# Patient Record
Sex: MALE | Race: WHITE | NOT HISPANIC OR LATINO | URBAN - METROPOLITAN AREA
[De-identification: names, ages, dates, MRNs, and addresses within clinical notes are randomized per-mention and may not be internally consistent; named-entity substitution may affect disease eponyms.]

---

## 2017-08-28 ENCOUNTER — INPATIENT HOSPITAL (OUTPATIENT)
Dept: URBAN - METROPOLITAN AREA HOSPITAL 107 | Facility: HOSPITAL | Age: 62
End: 2017-08-28
Payer: COMMERCIAL

## 2017-08-28 DIAGNOSIS — K57.30 DIVERTICULOSIS OF LARGE INTESTINE WITHOUT PERFORATION OR ABS: ICD-10-CM

## 2017-08-28 DIAGNOSIS — K63.5 POLYP OF COLON: ICD-10-CM

## 2017-08-28 DIAGNOSIS — R10.31 RIGHT LOWER QUADRANT PAIN: ICD-10-CM

## 2017-08-28 DIAGNOSIS — R19.5 OTHER FECAL ABNORMALITIES: ICD-10-CM

## 2017-08-28 DIAGNOSIS — K63.9 DISEASE OF INTESTINE, UNSPECIFIED: ICD-10-CM

## 2017-08-28 DIAGNOSIS — K92.2 GASTROINTESTINAL HEMORRHAGE, UNSPECIFIED: ICD-10-CM

## 2017-08-28 PROCEDURE — 45378 DIAGNOSTIC COLONOSCOPY: CPT | Performed by: INTERNAL MEDICINE

## 2017-08-29 ENCOUNTER — INPATIENT HOSPITAL (OUTPATIENT)
Dept: URBAN - METROPOLITAN AREA HOSPITAL 107 | Facility: HOSPITAL | Age: 62
End: 2017-08-29
Payer: COMMERCIAL

## 2017-08-29 DIAGNOSIS — D64.9 ANEMIA, UNSPECIFIED: ICD-10-CM

## 2017-08-29 DIAGNOSIS — R14.0 ABDOMINAL DISTENSION (GASEOUS): ICD-10-CM

## 2017-08-29 DIAGNOSIS — R10.31 RIGHT LOWER QUADRANT PAIN: ICD-10-CM

## 2017-08-29 DIAGNOSIS — Z86.010 PERSONAL HISTORY OF COLONIC POLYPS: ICD-10-CM

## 2017-08-29 PROCEDURE — 99231 SBSQ HOSP IP/OBS SF/LOW 25: CPT | Performed by: PHYSICIAN ASSISTANT

## 2017-08-30 PROCEDURE — 99232 SBSQ HOSP IP/OBS MODERATE 35: CPT | Performed by: PHYSICIAN ASSISTANT

## 2017-08-31 PROCEDURE — 99232 SBSQ HOSP IP/OBS MODERATE 35: CPT | Performed by: PHYSICIAN ASSISTANT

## 2017-09-01 PROCEDURE — 99232 SBSQ HOSP IP/OBS MODERATE 35: CPT | Performed by: PHYSICIAN ASSISTANT

## 2017-09-02 ENCOUNTER — INPATIENT HOSPITAL (OUTPATIENT)
Dept: URBAN - METROPOLITAN AREA HOSPITAL 107 | Facility: HOSPITAL | Age: 62
End: 2017-09-02
Payer: COMMERCIAL

## 2017-09-02 DIAGNOSIS — R10.9 UNSPECIFIED ABDOMINAL PAIN: ICD-10-CM

## 2017-09-02 DIAGNOSIS — K56.7 ILEUS, UNSPECIFIED: ICD-10-CM

## 2017-09-02 DIAGNOSIS — D64.9 ANEMIA, UNSPECIFIED: ICD-10-CM

## 2017-09-02 DIAGNOSIS — Z86.010 PERSONAL HISTORY OF COLONIC POLYPS: ICD-10-CM

## 2017-09-02 PROCEDURE — 99232 SBSQ HOSP IP/OBS MODERATE 35: CPT | Performed by: INTERNAL MEDICINE

## 2017-09-03 PROCEDURE — 99232 SBSQ HOSP IP/OBS MODERATE 35: CPT | Performed by: INTERNAL MEDICINE

## 2017-09-04 PROCEDURE — 99232 SBSQ HOSP IP/OBS MODERATE 35: CPT | Performed by: INTERNAL MEDICINE

## 2017-09-05 PROCEDURE — 99232 SBSQ HOSP IP/OBS MODERATE 35: CPT | Performed by: PHYSICIAN ASSISTANT

## 2017-09-06 ENCOUNTER — INPATIENT HOSPITAL (OUTPATIENT)
Dept: URBAN - METROPOLITAN AREA HOSPITAL 107 | Facility: HOSPITAL | Age: 62
End: 2017-09-06
Payer: COMMERCIAL

## 2017-09-06 DIAGNOSIS — R14.0 ABDOMINAL DISTENSION (GASEOUS): ICD-10-CM

## 2017-09-06 DIAGNOSIS — D64.9 ANEMIA, UNSPECIFIED: ICD-10-CM

## 2017-09-06 DIAGNOSIS — R10.9 UNSPECIFIED ABDOMINAL PAIN: ICD-10-CM

## 2017-09-06 PROCEDURE — 99231 SBSQ HOSP IP/OBS SF/LOW 25: CPT | Performed by: PHYSICIAN ASSISTANT

## 2017-09-11 PROCEDURE — 99231 SBSQ HOSP IP/OBS SF/LOW 25: CPT | Performed by: PHYSICIAN ASSISTANT

## 2019-05-02 ENCOUNTER — INPATIENT HOSPITAL (OUTPATIENT)
Dept: URBAN - METROPOLITAN AREA HOSPITAL 76 | Facility: HOSPITAL | Age: 64
End: 2019-05-02

## 2019-05-02 DIAGNOSIS — R14.0 ABDOMINAL DISTENSION (GASEOUS): ICD-10-CM

## 2019-05-02 DIAGNOSIS — R19.7 DIARRHEA, UNSPECIFIED: ICD-10-CM

## 2019-05-02 DIAGNOSIS — K92.0 HEMATEMESIS: ICD-10-CM

## 2019-05-02 PROCEDURE — 99252 IP/OBS CONSLTJ NEW/EST SF 35: CPT | Performed by: INTERNAL MEDICINE

## 2019-05-03 ENCOUNTER — INPATIENT HOSPITAL (OUTPATIENT)
Dept: URBAN - METROPOLITAN AREA HOSPITAL 76 | Facility: HOSPITAL | Age: 64
End: 2019-05-03

## 2019-05-03 DIAGNOSIS — R10.84 GENERALIZED ABDOMINAL PAIN: ICD-10-CM

## 2019-05-03 DIAGNOSIS — J44.9 CHRONIC OBSTRUCTIVE PULMONARY DISEASE, UNSPECIFIED: ICD-10-CM

## 2019-05-03 DIAGNOSIS — I48.91 UNSPECIFIED ATRIAL FIBRILLATION: ICD-10-CM

## 2019-05-03 DIAGNOSIS — I25.10 ATHEROSCLEROTIC HEART DISEASE OF NATIVE CORONARY ARTERY WITH: ICD-10-CM

## 2019-05-03 DIAGNOSIS — K92.0 HEMATEMESIS: ICD-10-CM

## 2019-05-03 DIAGNOSIS — Z79.01 LONG TERM (CURRENT) USE OF ANTICOAGULANTS: ICD-10-CM

## 2019-05-03 DIAGNOSIS — Z95.1 PRESENCE OF AORTOCORONARY BYPASS GRAFT: ICD-10-CM

## 2019-05-03 DIAGNOSIS — R93.3 ABNORMAL FINDINGS ON DIAGNOSTIC IMAGING OF OTHER PARTS OF DI: ICD-10-CM

## 2019-05-03 DIAGNOSIS — Z86.73 PERSONAL HISTORY OF TRANSIENT ISCHEMIC ATTACK (TIA), AND CER: ICD-10-CM

## 2019-05-03 DIAGNOSIS — D53.9 NUTRITIONAL ANEMIA, UNSPECIFIED: ICD-10-CM

## 2019-05-03 PROCEDURE — 99232 SBSQ HOSP IP/OBS MODERATE 35: CPT | Performed by: INTERNAL MEDICINE

## 2019-05-06 ENCOUNTER — INPATIENT HOSPITAL (OUTPATIENT)
Dept: URBAN - METROPOLITAN AREA HOSPITAL 76 | Facility: HOSPITAL | Age: 64
End: 2019-05-06

## 2019-05-06 DIAGNOSIS — R93.3 ABNORMAL FINDINGS ON DIAGNOSTIC IMAGING OF OTHER PARTS OF DI: ICD-10-CM

## 2019-05-06 PROCEDURE — 45378 DIAGNOSTIC COLONOSCOPY: CPT | Mod: 53 | Performed by: INTERNAL MEDICINE

## 2019-05-20 ENCOUNTER — INPATIENT HOSPITAL (OUTPATIENT)
Dept: URBAN - METROPOLITAN AREA HOSPITAL 76 | Facility: HOSPITAL | Age: 64
End: 2019-05-20

## 2019-05-20 DIAGNOSIS — R14.0 ABDOMINAL DISTENSION (GASEOUS): ICD-10-CM

## 2019-05-20 DIAGNOSIS — K56.609 UNSPECIFIED INTESTINAL OBSTRUCTION, UNSPECIFIED AS TO PARTIA: ICD-10-CM

## 2019-05-20 DIAGNOSIS — D64.89 OTHER SPECIFIED ANEMIAS: ICD-10-CM

## 2019-05-20 DIAGNOSIS — A04.72 ENTEROCOLITIS DUE TO CLOSTRIDIUM DIFFICILE, NOT SPECIFIED AS: ICD-10-CM

## 2019-05-20 PROCEDURE — 99232 SBSQ HOSP IP/OBS MODERATE 35: CPT | Performed by: NURSE PRACTITIONER

## 2019-05-21 PROCEDURE — 99231 SBSQ HOSP IP/OBS SF/LOW 25: CPT | Performed by: NURSE PRACTITIONER

## 2019-06-20 ENCOUNTER — OFFICE (OUTPATIENT)
Dept: URBAN - METROPOLITAN AREA CLINIC 64 | Facility: CLINIC | Age: 64
End: 2019-06-20

## 2019-06-20 VITALS
WEIGHT: 170 LBS | DIASTOLIC BLOOD PRESSURE: 89 MMHG | SYSTOLIC BLOOD PRESSURE: 145 MMHG | HEIGHT: 67 IN | HEART RATE: 72 BPM

## 2019-06-20 DIAGNOSIS — K56.60 UNSPECIFIED INTESTINAL OBSTRUCTION: ICD-10-CM

## 2019-06-20 DIAGNOSIS — R12 HEARTBURN: ICD-10-CM

## 2019-06-20 DIAGNOSIS — A04.7 ENTEROCOLITIS DUE TO CLOSTRIDIUM DIFFICILE: ICD-10-CM

## 2019-06-20 DIAGNOSIS — K59.00 CONSTIPATION, UNSPECIFIED: ICD-10-CM

## 2019-06-20 PROCEDURE — 99202 OFFICE O/P NEW SF 15 MIN: CPT | Performed by: NURSE PRACTITIONER

## 2019-10-03 ENCOUNTER — LAB REQUISITION (OUTPATIENT)
Dept: LAB | Facility: HOSPITAL | Age: 64
End: 2019-10-03

## 2019-10-03 DIAGNOSIS — Z00.00 ROUTINE GENERAL MEDICAL EXAMINATION AT A HEALTH CARE FACILITY: ICD-10-CM

## 2019-10-03 LAB — NT-PROBNP SERPL-MCNC: 458.5 PG/ML (ref 5–900)

## 2019-10-03 PROCEDURE — 83880 ASSAY OF NATRIURETIC PEPTIDE: CPT

## 2019-12-11 ENCOUNTER — LAB REQUISITION (OUTPATIENT)
Dept: LAB | Facility: HOSPITAL | Age: 64
End: 2019-12-11

## 2019-12-11 DIAGNOSIS — I50.9 HEART FAILURE, UNSPECIFIED (HCC): ICD-10-CM

## 2019-12-11 LAB — NT-PROBNP SERPL-MCNC: 549.4 PG/ML (ref 5–900)

## 2019-12-11 PROCEDURE — 83880 ASSAY OF NATRIURETIC PEPTIDE: CPT | Performed by: INTERNAL MEDICINE

## 2019-12-12 ENCOUNTER — OFFICE (OUTPATIENT)
Dept: URBAN - METROPOLITAN AREA CLINIC 64 | Facility: CLINIC | Age: 64
End: 2019-12-12
Payer: MEDICAID

## 2019-12-12 VITALS
HEIGHT: 67 IN | HEART RATE: 87 BPM | DIASTOLIC BLOOD PRESSURE: 96 MMHG | WEIGHT: 199 LBS | SYSTOLIC BLOOD PRESSURE: 167 MMHG

## 2019-12-12 DIAGNOSIS — R14.0 ABDOMINAL DISTENSION (GASEOUS): ICD-10-CM

## 2019-12-12 DIAGNOSIS — J44.9 CHRONIC OBSTRUCTIVE PULMONARY DISEASE, UNSPECIFIED: ICD-10-CM

## 2019-12-12 DIAGNOSIS — R11.0 NAUSEA: ICD-10-CM

## 2019-12-12 DIAGNOSIS — R10.9 UNSPECIFIED ABDOMINAL PAIN: ICD-10-CM

## 2019-12-12 DIAGNOSIS — R19.07 GENERALIZED INTRA-ABDOMINAL AND PELVIC SWELLING, MASS AND LU: ICD-10-CM

## 2019-12-12 DIAGNOSIS — I10 ESSENTIAL (PRIMARY) HYPERTENSION: ICD-10-CM

## 2019-12-12 PROCEDURE — 99214 OFFICE O/P EST MOD 30 MIN: CPT | Performed by: NURSE PRACTITIONER

## 2019-12-12 RX ORDER — ONDANSETRON HYDROCHLORIDE 4 MG/1
TABLET, FILM COATED ORAL
Qty: 60 | Refills: 1 | Status: ACTIVE

## 2019-12-12 RX ORDER — SIMETHICONE 180 MG/1
CAPSULE, LIQUID FILLED ORAL
Qty: 0 | Refills: 0 | Status: COMPLETED
End: 2019-12-12

## 2020-02-12 ENCOUNTER — OFFICE VISIT (OUTPATIENT)
Dept: CARDIOLOGY | Facility: CLINIC | Age: 65
End: 2020-02-12

## 2020-02-12 VITALS
DIASTOLIC BLOOD PRESSURE: 96 MMHG | SYSTOLIC BLOOD PRESSURE: 138 MMHG | BODY MASS INDEX: 28.61 KG/M2 | OXYGEN SATURATION: 96 % | HEIGHT: 71 IN | WEIGHT: 204.4 LBS | HEART RATE: 90 BPM

## 2020-02-12 DIAGNOSIS — I10 ESSENTIAL HYPERTENSION: ICD-10-CM

## 2020-02-12 DIAGNOSIS — I25.10 CORONARY ARTERY DISEASE INVOLVING NATIVE CORONARY ARTERY OF NATIVE HEART WITHOUT ANGINA PECTORIS: Primary | ICD-10-CM

## 2020-02-12 DIAGNOSIS — E78.2 MIXED HYPERLIPIDEMIA: ICD-10-CM

## 2020-02-12 DIAGNOSIS — I48.11 LONGSTANDING PERSISTENT ATRIAL FIBRILLATION (HCC): ICD-10-CM

## 2020-02-12 PROCEDURE — 93000 ELECTROCARDIOGRAM COMPLETE: CPT | Performed by: INTERNAL MEDICINE

## 2020-02-12 PROCEDURE — 99214 OFFICE O/P EST MOD 30 MIN: CPT | Performed by: INTERNAL MEDICINE

## 2020-02-12 RX ORDER — TRAZODONE HYDROCHLORIDE 50 MG/1
50 TABLET ORAL DAILY
COMMUNITY
Start: 2017-10-17

## 2020-02-12 RX ORDER — FLUTICASONE PROPIONATE 50 MCG
SPRAY, SUSPENSION (ML) NASAL
COMMUNITY
Start: 2020-01-22

## 2020-02-12 RX ORDER — MAGNESIUM HYDROXIDE 1200 MG/15ML
SUSPENSION ORAL
COMMUNITY
Start: 2020-02-10

## 2020-02-12 RX ORDER — CYCLOBENZAPRINE HCL 5 MG
5 TABLET ORAL 3 TIMES DAILY PRN
COMMUNITY
Start: 2020-01-17

## 2020-02-12 RX ORDER — FAMOTIDINE 40 MG/1
40 TABLET, FILM COATED ORAL 2 TIMES DAILY
COMMUNITY
Start: 2020-01-31

## 2020-02-12 RX ORDER — POTASSIUM CHLORIDE 750 MG/1
10 TABLET, FILM COATED, EXTENDED RELEASE ORAL
COMMUNITY
Start: 2020-02-09

## 2020-02-12 RX ORDER — LOSARTAN POTASSIUM 100 MG/1
100 TABLET ORAL
COMMUNITY
Start: 2020-01-31

## 2020-02-12 RX ORDER — HYDROCODONE BITARTRATE AND ACETAMINOPHEN 5; 325 MG/1; MG/1
TABLET ORAL AS NEEDED
COMMUNITY
Start: 2020-01-29

## 2020-02-12 RX ORDER — POLYETHYLENE GLYCOL 3350 17 G/17G
17 POWDER, FOR SOLUTION ORAL DAILY
COMMUNITY
Start: 2017-08-10

## 2020-02-12 RX ORDER — TAMSULOSIN HYDROCHLORIDE 0.4 MG/1
CAPSULE ORAL
COMMUNITY
Start: 2018-03-28

## 2020-02-12 RX ORDER — ONDANSETRON 4 MG/1
4 TABLET, FILM COATED ORAL AS NEEDED
COMMUNITY
Start: 2017-08-10

## 2020-02-12 RX ORDER — DILTIAZEM HYDROCHLORIDE 120 MG/1
TABLET, FILM COATED ORAL 3 TIMES DAILY
COMMUNITY
Start: 2019-12-29

## 2020-02-12 RX ORDER — NITROGLYCERIN 0.4 MG/1
TABLET SUBLINGUAL
COMMUNITY
Start: 2017-08-10

## 2020-02-12 NOTE — PROGRESS NOTES
"Cardiology Office Visit      Encounter Date:  02/12/2020    Patient ID:   Gordon Dowling is a 65 y.o. male.    Reason For Followup:  Coronary artery disease  Hypertension  Hyperlipidemia  Chronic atrial fibrillation  History of CVA    Brief Clinical History:  Dear  Provider, No Known    I had the pleasure of seeing Gordon Dowling today. As you are well aware, this is a 65 y.o. male with a prior history of known coronary artery disease prior coronary artery bypass surgery history of PCI and stenting/history of hypertension hyperlipidemia and also history of chronic atrial fibrillation history of CVA presented here for follow-up  Patient denies any symptoms of chest pain    Interval History:  Denies any new cardiac symptoms  Denies any chest pain no shortness of breath no dizziness no syncope    Assessment & Plan    Impressions:  Coronary artery disease  Hypertension  Hyperlipidemia  Chronic atrial fibrillation  History of CVA    Recommendations:  Continue Xarelto for anticoagulation for stroke prophylaxis  Continue current medical management and continued aggressive risk factor modification  Recommend a CBC CMP every 3 months  Recommend lipids every 6 months  Continue aspirin statin and beta-blockers  Continue close monitoring  Follow-up in office in 6 months    Objective:    Vitals:  Vitals:    02/12/20 1147   BP: 138/96   BP Location: Left arm   Pulse: 90   SpO2: 96%   Weight: 92.7 kg (204 lb 6.4 oz)   Height: 180.3 cm (71\")       Physical Exam:    General: Alert, cooperative, no distress, appears stated age  Head:  Normocephalic, atraumatic, mucous membranes moist  Eyes:  Conjunctiva/corneas clear, EOM's intact     Neck:  Supple,  no adenopathy;      Lungs: Clear to auscultation bilaterally, no wheezes rhonchi rales are noted  Chest wall: No tenderness  Heart::  Regular rate and rhythm, S1 and S2 normal, no murmur, rub or gallop  Abdomen: Soft, non-tender, nondistended bowel sounds active  Extremities: No " cyanosis, clubbing, or edema  Pulses: 2+ and symmetric all extremities  Skin:  No rashes or lesions  Neuro/psych: A&O x3. CN II through XII are grossly intact with appropriate affect      Allergies:  Allergies   Allergen Reactions   • Codeine Itching       Medication Review:     Current Outpatient Medications:   •  Cetirizine HCl 10 MG capsule, Take 10 mg by mouth., Disp: , Rfl:   •  cyclobenzaprine (FLEXERIL) 5 MG tablet, , Disp: , Rfl:   •  dilTIAZem (CARDIZEM) 120 MG tablet, 3 (Three) Times a Day., Disp: , Rfl:   •  famotidine (PEPCID) 40 MG tablet, , Disp: , Rfl:   •  fluticasone (FLONASE) 50 MCG/ACT nasal spray, , Disp: , Rfl:   •  HYDROcodone-acetaminophen (NORCO) 5-325 MG per tablet, As Needed., Disp: , Rfl:   •  KLOR-CON 10 MEQ CR tablet, Daily. 2 tabs daily, Disp: , Rfl:   •  losartan (COZAAR) 100 MG tablet, 0.5 mg 2 (Two) Times a Day., Disp: , Rfl:   •  MILK OF MAGNESIA 7.75 % suspension, , Disp: , Rfl:   •  nitroglycerin (NITROSTAT) 0.4 MG SL tablet, Nitrostat 0.4 mg sublingual tablet  Place 1 tablet by sublingual route., Disp: , Rfl:   •  ondansetron (ZOFRAN) 4 MG tablet, Take 4 mg by mouth As Needed., Disp: , Rfl:   •  phenylephrine-cocoa Butter (PREPARATION H) 0.25-88.44 % suppository suppository, Preparation H(phenyleph,cocoa buttr) 0.25 %-88.44 % rectal suppository  Insert 1 suppository every day by rectal route as needed., Disp: , Rfl:   •  polyethylene glycol (MIRALAX) powder, Take 17 g by mouth Daily., Disp: , Rfl:   •  rivaroxaban (XARELTO) 20 MG tablet, Xarelto 20 mg tablet  Take 1 tablet every day by oral route., Disp: , Rfl:   •  tamsulosin (FLOMAX) 0.4 MG capsule 24 hr capsule, tamsulosin 0.4 mg capsule  Take 1 capsule every day by oral route., Disp: , Rfl:   •  traZODone (DESYREL) 50 MG tablet, Take 25 mg by mouth Daily., Disp: , Rfl:     Family History:  History reviewed. No pertinent family history.    Past Medical History:  Past Medical History:   Diagnosis Date   • Atrial fibrillation  (CMS/[facility])    • CHF (congestive heart failure) (CMS/[facility])    • Chronic kidney disease    • COPD (chronic obstructive pulmonary disease) (CMS/[facility])    • Hypertension    • Myocardial infarction (CMS/[facility])    • Stroke (CMS/[facility])        Past surgical History:  No past surgical history on file.    Social History:  Social History     Socioeconomic History   • Marital status: Single     Spouse name: Not on file   • Number of children: Not on file   • Years of education: Not on file   • Highest education level: Not on file   Tobacco Use   • Smoking status: Former Smoker     Last attempt to quit:      Years since quittin.1   • Smokeless tobacco: Never Used   Substance and Sexual Activity   • Alcohol use: Not Currently     Frequency: Never   • Drug use: Never       Review of Systems:  The following systems were reviewed as they relate to the cardiovascular system: Constitutional, Eyes, ENT, Cardiovascular, Respiratory, Gastrointestinal, Integumentary, Neurological, Psychiatric, Hematologic, Endocrine, Musculoskeletal, and Genitourinary. The pertinent cardiovascular findings are reported above with all other cardiovascular points within those systems being negative.    Diagnostic Study Review:     Current Electrocardiogram:    ECG 12 Lead  Date/Time: 2020 12:24 PM  Performed by: Radha Carty MD  Authorized by: Radha Carty MD   Comparison: compared with previous ECG   Similar to previous ECG  Rhythm: atrial fibrillation  Rate: normal  BPM: 90  Conduction: conduction normal  QRS axis: normal  Other findings: non-specific ST-T wave changes and poor R wave progression    Clinical impression: abnormal EKG              NOTE: The following portions of the patient's history were reviewed and updated this visit as appropriate: allergies, current medications, past family history, past medical history, past social history, past surgical history and problem list.

## 2020-03-06 ENCOUNTER — LAB REQUISITION (OUTPATIENT)
Dept: LAB | Facility: HOSPITAL | Age: 65
End: 2020-03-06

## 2020-03-06 DIAGNOSIS — Z00.00 ROUTINE GENERAL MEDICAL EXAMINATION AT A HEALTH CARE FACILITY: ICD-10-CM

## 2020-03-06 LAB — NT-PROBNP SERPL-MCNC: 394.9 PG/ML (ref 5–900)

## 2020-03-06 PROCEDURE — 83880 ASSAY OF NATRIURETIC PEPTIDE: CPT

## 2020-07-14 ENCOUNTER — LAB REQUISITION (OUTPATIENT)
Dept: LAB | Facility: HOSPITAL | Age: 65
End: 2020-07-14

## 2020-07-14 DIAGNOSIS — R10.9 UNSPECIFIED ABDOMINAL PAIN: ICD-10-CM

## 2020-07-14 LAB
ALBUMIN SERPL-MCNC: 4.4 G/DL (ref 3.5–5.2)
ALBUMIN/GLOB SERPL: 1.6 G/DL
ALP SERPL-CCNC: 98 U/L (ref 39–117)
ALT SERPL W P-5'-P-CCNC: 20 U/L (ref 1–41)
ANION GAP SERPL CALCULATED.3IONS-SCNC: 16 MMOL/L (ref 5–15)
AST SERPL-CCNC: 21 U/L (ref 1–40)
BASOPHILS # BLD AUTO: 0 10*3/MM3 (ref 0–0.2)
BASOPHILS NFR BLD AUTO: 0.4 % (ref 0–1.5)
BILIRUB SERPL-MCNC: 0.3 MG/DL (ref 0–1.2)
BUN SERPL-MCNC: 24 MG/DL (ref 8–23)
BUN SERPL-MCNC: ABNORMAL MG/DL
BUN/CREAT SERPL: ABNORMAL
CALCIUM SPEC-SCNC: 9.1 MG/DL (ref 8.6–10.5)
CHLORIDE SERPL-SCNC: 100 MMOL/L (ref 98–107)
CO2 SERPL-SCNC: 27 MMOL/L (ref 22–29)
CREAT SERPL-MCNC: 1.54 MG/DL (ref 0.76–1.27)
DEPRECATED RDW RBC AUTO: 51.2 FL (ref 37–54)
EOSINOPHIL # BLD AUTO: 0.1 10*3/MM3 (ref 0–0.4)
EOSINOPHIL NFR BLD AUTO: 2 % (ref 0.3–6.2)
ERYTHROCYTE [DISTWIDTH] IN BLOOD BY AUTOMATED COUNT: 15.2 % (ref 12.3–15.4)
GFR SERPL CREATININE-BSD FRML MDRD: 45 ML/MIN/1.73
GFR SERPL CREATININE-BSD FRML MDRD: 55 ML/MIN/1.73
GLOBULIN UR ELPH-MCNC: 2.7 GM/DL
GLUCOSE SERPL-MCNC: 178 MG/DL (ref 65–99)
HCT VFR BLD AUTO: 38.5 % (ref 37.5–51)
HGB BLD-MCNC: 12.4 G/DL (ref 13–17.7)
LYMPHOCYTES # BLD AUTO: 1.1 10*3/MM3 (ref 0.7–3.1)
LYMPHOCYTES NFR BLD AUTO: 21.2 % (ref 19.6–45.3)
MCH RBC QN AUTO: 30.9 PG (ref 26.6–33)
MCHC RBC AUTO-ENTMCNC: 32.3 G/DL (ref 31.5–35.7)
MCV RBC AUTO: 95.9 FL (ref 79–97)
MONOCYTES # BLD AUTO: 0.5 10*3/MM3 (ref 0.1–0.9)
MONOCYTES NFR BLD AUTO: 9.4 % (ref 5–12)
NEUTROPHILS NFR BLD AUTO: 3.6 10*3/MM3 (ref 1.7–7)
NEUTROPHILS NFR BLD AUTO: 67 % (ref 42.7–76)
NRBC BLD AUTO-RTO: 0.1 /100 WBC (ref 0–0.2)
NT-PROBNP SERPL-MCNC: 380.4 PG/ML (ref 0–900)
PLATELET # BLD AUTO: 195 10*3/MM3 (ref 140–450)
PMV BLD AUTO: 8.6 FL (ref 6–12)
POTASSIUM SERPL-SCNC: 3.9 MMOL/L (ref 3.5–5.2)
PROT SERPL-MCNC: 7.1 G/DL (ref 6–8.5)
RBC # BLD AUTO: 4.02 10*6/MM3 (ref 4.14–5.8)
SODIUM SERPL-SCNC: 143 MMOL/L (ref 136–145)
WBC # BLD AUTO: 5.3 10*3/MM3 (ref 3.4–10.8)

## 2020-07-14 PROCEDURE — 85025 COMPLETE CBC W/AUTO DIFF WBC: CPT | Performed by: INTERNAL MEDICINE

## 2020-07-14 PROCEDURE — 80053 COMPREHEN METABOLIC PANEL: CPT | Performed by: INTERNAL MEDICINE

## 2020-07-14 PROCEDURE — 83880 ASSAY OF NATRIURETIC PEPTIDE: CPT | Performed by: INTERNAL MEDICINE

## 2020-08-12 ENCOUNTER — OFFICE VISIT (OUTPATIENT)
Dept: CARDIOLOGY | Facility: CLINIC | Age: 65
End: 2020-08-12

## 2020-08-12 VITALS
HEART RATE: 84 BPM | BODY MASS INDEX: 29.68 KG/M2 | SYSTOLIC BLOOD PRESSURE: 129 MMHG | HEIGHT: 71 IN | DIASTOLIC BLOOD PRESSURE: 73 MMHG | WEIGHT: 212 LBS | RESPIRATION RATE: 18 BRPM | OXYGEN SATURATION: 93 %

## 2020-08-12 DIAGNOSIS — I48.0 PAROXYSMAL ATRIAL FIBRILLATION (HCC): Primary | ICD-10-CM

## 2020-08-12 DIAGNOSIS — I63.9 CEREBROVASCULAR ACCIDENT (CVA), UNSPECIFIED MECHANISM (HCC): ICD-10-CM

## 2020-08-12 DIAGNOSIS — I50.22 CHRONIC SYSTOLIC CONGESTIVE HEART FAILURE (HCC): ICD-10-CM

## 2020-08-12 DIAGNOSIS — I21.9 MYOCARDIAL INFARCTION, UNSPECIFIED MI TYPE, UNSPECIFIED ARTERY (HCC): ICD-10-CM

## 2020-08-12 DIAGNOSIS — I10 HYPERTENSION, UNSPECIFIED TYPE: ICD-10-CM

## 2020-08-12 PROBLEM — I50.9 CHF (CONGESTIVE HEART FAILURE) (HCC): Status: ACTIVE | Noted: 2020-08-12

## 2020-08-12 PROBLEM — I48.91 ATRIAL FIBRILLATION (HCC): Status: ACTIVE | Noted: 2020-08-12

## 2020-08-12 PROCEDURE — 99214 OFFICE O/P EST MOD 30 MIN: CPT | Performed by: INTERNAL MEDICINE

## 2020-08-12 PROCEDURE — 93000 ELECTROCARDIOGRAM COMPLETE: CPT | Performed by: INTERNAL MEDICINE

## 2020-08-12 RX ORDER — BUSPIRONE HYDROCHLORIDE 15 MG/1
15 TABLET ORAL 3 TIMES DAILY
COMMUNITY

## 2020-08-12 RX ORDER — BISACODYL 10 MG
10 SUPPOSITORY, RECTAL RECTAL DAILY
COMMUNITY

## 2020-08-12 RX ORDER — GUAIFENESIN 600 MG/1
1200 TABLET, EXTENDED RELEASE ORAL 2 TIMES DAILY
COMMUNITY

## 2020-08-12 RX ORDER — SIMETHICONE 125 MG
125 TABLET,CHEWABLE ORAL EVERY 6 HOURS PRN
COMMUNITY

## 2020-08-12 RX ORDER — BENZONATATE 100 MG/1
100 CAPSULE ORAL 3 TIMES DAILY PRN
COMMUNITY

## 2020-08-12 RX ORDER — DOCUSATE SODIUM 100 MG/1
100 CAPSULE, LIQUID FILLED ORAL 2 TIMES DAILY
COMMUNITY

## 2020-08-12 RX ORDER — BISMUTH SUBSALICYLATE 262 MG/1
524 TABLET, CHEWABLE ORAL
COMMUNITY

## 2020-08-12 RX ORDER — CLONIDINE HYDROCHLORIDE 0.1 MG/1
0.1 TABLET ORAL 2 TIMES DAILY
COMMUNITY

## 2020-08-12 RX ORDER — METOCLOPRAMIDE 10 MG/1
10 TABLET ORAL
COMMUNITY

## 2020-08-12 RX ORDER — ESCITALOPRAM OXALATE 5 MG/1
5 TABLET ORAL DAILY
COMMUNITY

## 2020-08-12 RX ORDER — BUMETANIDE 1 MG/1
1 TABLET ORAL 3 TIMES DAILY
COMMUNITY

## 2020-08-12 RX ORDER — DEXTROMETHORPHAN POLISTIREX 30 MG/5ML
SUSPENSION ORAL
COMMUNITY

## 2020-08-12 RX ORDER — AMMONIUM LACTATE 120 MG/G
CREAM TOPICAL AS NEEDED
COMMUNITY

## 2020-08-12 RX ORDER — LIDOCAINE 40 MG/G
CREAM TOPICAL AS NEEDED
COMMUNITY

## 2020-08-12 RX ORDER — ALBUTEROL SULFATE 1.25 MG/3ML
1 SOLUTION RESPIRATORY (INHALATION) EVERY 6 HOURS PRN
COMMUNITY

## 2020-08-12 NOTE — PROGRESS NOTES
"Cardiology Office Visit      Encounter Date:  08/12/2020    Patient ID:   Gordon Dowling is a 65 y.o. male.    Reason For Followup:  Coronary artery disease  Hypertension  Hyperlipidemia  Chronic atrial fibrillation  History of CVA    Brief Clinical History:  Dear  Provider, No Known    I had the pleasure of seeing Gordon Dowling today. As you are well aware, this is a 65 y.o. male with a prior history of known coronary artery disease prior coronary artery bypass surgery history of PCI and stenting/history of hypertension hyperlipidemia and also history of chronic atrial fibrillation history of CVA presented here for follow-up  Patient denies any symptoms of chest pain    Interval History:  Denies any new cardiac symptoms  Denies any chest pain no shortness of breath no dizziness no syncope    Assessment & Plan    Impressions:  Coronary artery disease  Hypertension  Hyperlipidemia  Chronic atrial fibrillation  History of CVA    Recommendations:  Continue Xarelto for anticoagulation for stroke prophylaxis  Continue current medical management and continued aggressive risk factor modification  Recommend a CBC CMP every 3 months  Recommend lipids every 6 months  Continue aspirin statin and beta-blockers  Continue close monitoring  Follow-up in office in 6 months    Objective:    Vitals:  Vitals:    08/12/20 1147   BP: 129/73   Pulse: 84   Resp: 18   SpO2: 93%   Weight: 96.2 kg (212 lb)   Height: 180.3 cm (71\")       Physical Exam:    General: Alert, cooperative, no distress, appears stated age/sitting in wheelchair with no distress  Head:  Normocephalic, atraumatic, mucous membranes moist  Eyes:  Conjunctiva/corneas clear, EOM's intact     Neck:  Supple,  no adenopathy;      Lungs: Clear to auscultation bilaterally, no wheezes rhonchi rales are noted  Chest wall: No tenderness  Heart::  Regular rate and rhythm, S1 and S2 normal, no murmur, rub or gallop  Abdomen: Soft, non-tender, nondistended bowel sounds " active  Extremities: No cyanosis, clubbing, or edema  Pulses: 2+ and symmetric all extremities  Skin:  No rashes or lesions  Neuro/psych: A&O x3. CN II through XII are grossly intact with appropriate affect      Allergies:  Allergies   Allergen Reactions   • Codeine Itching       Medication Review:     Current Outpatient Medications:   •  albuterol (ACCUNEB) 1.25 MG/3ML nebulizer solution, Take 1 ampule by nebulization Every 6 (Six) Hours As Needed for Wheezing., Disp: , Rfl:   •  ammonium lactate (AMLACTIN) 12 % cream, Apply  topically to the appropriate area as directed As Needed for Dry Skin., Disp: , Rfl:   •  benzonatate (TESSALON) 100 MG capsule, Take 100 mg by mouth 3 (Three) Times a Day As Needed for Cough., Disp: , Rfl:   •  bisacodyl (DULCOLAX) 10 MG suppository, Insert 10 mg into the rectum Daily., Disp: , Rfl:   •  bismuth subsalicylate (PEPTO BISMOL) 262 MG chewable tablet, Chew 524 mg 4 (Four) Times a Day Before Meals & at Bedtime., Disp: , Rfl:   •  bumetanide (BUMEX) 1 MG tablet, Take 1 mg by mouth 3 (Three) Times a Day., Disp: , Rfl:   •  busPIRone (BUSPAR) 15 MG tablet, Take 15 mg by mouth 3 (Three) Times a Day., Disp: , Rfl:   •  Cetirizine HCl 10 MG capsule, Take 10 mg by mouth., Disp: , Rfl:   •  cloNIDine (CATAPRES) 0.1 MG tablet, Take 0.1 mg by mouth 2 (Two) Times a Day., Disp: , Rfl:   •  cyclobenzaprine (FLEXERIL) 5 MG tablet, 5 mg 3 (Three) Times a Day As Needed., Disp: , Rfl:   •  dextromethorphan polistirex ER (Delsym) 30 MG/5ML Suspension Extended Release oral suspension, Take  by mouth., Disp: , Rfl:   •  dilTIAZem (CARDIZEM) 120 MG tablet, 3 (Three) Times a Day., Disp: , Rfl:   •  docusate sodium (COLACE) 100 MG capsule, Take 100 mg by mouth 2 (Two) Times a Day., Disp: , Rfl:   •  escitalopram (LEXAPRO) 5 MG tablet, Take 5 mg by mouth Daily., Disp: , Rfl:   •  famotidine (PEPCID) 40 MG tablet, Take 40 mg by mouth 2 (Two) Times a Day., Disp: , Rfl:   •  fluticasone (FLONASE) 50 MCG/ACT  nasal spray, , Disp: , Rfl:   •  guaiFENesin (MUCINEX) 600 MG 12 hr tablet, Take 1,200 mg by mouth 2 (Two) Times a Day., Disp: , Rfl:   •  HYDROcodone-acetaminophen (NORCO) 5-325 MG per tablet, As Needed., Disp: , Rfl:   •  KLOR-CON 10 MEQ CR tablet, Take 10 mEq by mouth. 2 tabs daily , Disp: , Rfl:   •  lidocaine (LMX) 4 % cream, Apply  topically to the appropriate area as directed As Needed for Mild Pain ., Disp: , Rfl:   •  losartan (COZAAR) 100 MG tablet, 100 mg. 1/2 tablet po BID, Disp: , Rfl:   •  Melatonin 3 MG capsule, Take  by mouth., Disp: , Rfl:   •  Menthol, Topical Analgesic, (Biofreeze) 4 % gel, Apply  topically., Disp: , Rfl:   •  metoclopramide (REGLAN) 10 MG tablet, Take 10 mg by mouth 4 (Four) Times a Day Before Meals & at Bedtime., Disp: , Rfl:   •  MILK OF MAGNESIA 7.75 % suspension, , Disp: , Rfl:   •  mometasone-formoterol (DULERA 100) 100-5 MCG/ACT inhaler, Inhale 2 puffs 2 (Two) Times a Day., Disp: , Rfl:   •  nitroglycerin (NITROSTAT) 0.4 MG SL tablet, Nitrostat 0.4 mg sublingual tablet  Place 1 tablet by sublingual route., Disp: , Rfl:   •  ondansetron (ZOFRAN) 4 MG tablet, Take 4 mg by mouth As Needed., Disp: , Rfl:   •  phenylephrine-cocoa Butter (PREPARATION H) 0.25-88.44 % suppository suppository, Preparation H(phenyleph,cocoa buttr) 0.25 %-88.44 % rectal suppository  Insert 1 suppository every day by rectal route as needed., Disp: , Rfl:   •  polyethylene glycol (MIRALAX) powder, Take 17 g by mouth Daily., Disp: , Rfl:   •  rivaroxaban (XARELTO) 20 MG tablet, Xarelto 20 mg tablet  Take 1 tablet every day by oral route., Disp: , Rfl:   •  simethicone (MYLICON) 125 MG chewable tablet, Chew 125 mg Every 6 (Six) Hours As Needed for Flatulence., Disp: , Rfl:   •  tamsulosin (FLOMAX) 0.4 MG capsule 24 hr capsule, tamsulosin 0.4 mg capsule  Take 1 capsule every day by oral route., Disp: , Rfl:   •  traZODone (DESYREL) 50 MG tablet, Take 50 mg by mouth Daily. 1.5 tablets daily, Disp: , Rfl:      Family History:  No family history on file.    Past Medical History:  Past Medical History:   Diagnosis Date   • Atrial fibrillation (CMS/[facility])    • CHF (congestive heart failure) (CMS/[facility])    • Chronic kidney disease    • COPD (chronic obstructive pulmonary disease) (CMS/[facility])    • Hypertension    • Myocardial infarction (CMS/[facility])    • Stroke (CMS/[facility])        Past surgical History:  History reviewed. No pertinent surgical history.    Social History:  Social History     Socioeconomic History   • Marital status: Single     Spouse name: Not on file   • Number of children: Not on file   • Years of education: Not on file   • Highest education level: Not on file   Tobacco Use   • Smoking status: Former Smoker     Last attempt to quit:      Years since quittin.6   • Smokeless tobacco: Never Used   Substance and Sexual Activity   • Alcohol use: Not Currently     Frequency: Never   • Drug use: Never       Review of Systems:  The following systems were reviewed as they relate to the cardiovascular system: Constitutional, Eyes, ENT, Cardiovascular, Respiratory, Gastrointestinal, Integumentary, Neurological, Psychiatric, Hematologic, Endocrine, Musculoskeletal, and Genitourinary. The pertinent cardiovascular findings are reported above with all other cardiovascular points within those systems being negative.    Diagnostic Study Review:     Current Electrocardiogram:    ECG 12 Lead  Date/Time: 2020 12:18 PM  Performed by: Radha Carty MD  Authorized by: Radha Carty MD   Comparison: compared with previous ECG   Similar to previous ECG  Rhythm: sinus rhythm  Rate: normal  BPM: 84  Conduction: conduction normal  Q waves: III and aVF    QRS axis: normal  Other findings: non-specific ST-T wave changes    Clinical impression: abnormal EKG              NOTE: The following portions of the patient's history were reviewed and updated this visit as appropriate: allergies, current medications, past  family history, past medical history, past social history, past surgical history and problem list.

## 2020-11-30 PROCEDURE — 93005 ELECTROCARDIOGRAM TRACING: CPT | Performed by: INTERNAL MEDICINE

## 2020-11-30 PROCEDURE — 99285 EMERGENCY DEPT VISIT HI MDM: CPT

## 2020-11-30 PROCEDURE — 93005 ELECTROCARDIOGRAM TRACING: CPT

## 2020-11-30 RX ORDER — SODIUM CHLORIDE 0.9 % (FLUSH) 0.9 %
10 SYRINGE (ML) INJECTION AS NEEDED
Status: DISCONTINUED | OUTPATIENT
Start: 2020-11-30 | End: 2020-12-11 | Stop reason: HOSPADM

## 2020-12-01 ENCOUNTER — APPOINTMENT (OUTPATIENT)
Dept: CT IMAGING | Facility: HOSPITAL | Age: 65
End: 2020-12-01

## 2020-12-01 ENCOUNTER — APPOINTMENT (OUTPATIENT)
Dept: GENERAL RADIOLOGY | Facility: HOSPITAL | Age: 65
End: 2020-12-01

## 2020-12-01 ENCOUNTER — HOSPITAL ENCOUNTER (INPATIENT)
Facility: HOSPITAL | Age: 65
LOS: 8 days | Discharge: NURSING, REHAB OR LONG-TERM CARE | End: 2020-12-10
Attending: INTERNAL MEDICINE | Admitting: HOSPITALIST

## 2020-12-01 DIAGNOSIS — I26.99 SUBACUTE PULMONARY EMBOLISM (HCC): ICD-10-CM

## 2020-12-01 DIAGNOSIS — J18.9 PNEUMONIA OF BOTH LUNGS DUE TO INFECTIOUS ORGANISM, UNSPECIFIED PART OF LUNG: ICD-10-CM

## 2020-12-01 DIAGNOSIS — U07.1 CLINICAL DIAGNOSIS OF COVID-19: ICD-10-CM

## 2020-12-01 DIAGNOSIS — R06.00 DYSPNEA, UNSPECIFIED TYPE: Primary | ICD-10-CM

## 2020-12-01 DIAGNOSIS — R09.02 HYPOXIA: ICD-10-CM

## 2020-12-01 DIAGNOSIS — N28.9 RENAL INSUFFICIENCY: ICD-10-CM

## 2020-12-01 PROBLEM — J96.00 ACUTE RESPIRATORY FAILURE DUE TO COVID-19 (HCC): Status: ACTIVE | Noted: 2020-12-01

## 2020-12-01 LAB
ALBUMIN SERPL-MCNC: 4.3 G/DL (ref 3.5–5.2)
ALBUMIN/GLOB SERPL: 1.3 G/DL
ALP SERPL-CCNC: 98 U/L (ref 39–117)
ALT SERPL W P-5'-P-CCNC: 30 U/L (ref 1–41)
ANION GAP SERPL CALCULATED.3IONS-SCNC: 11 MMOL/L (ref 5–15)
ANION GAP SERPL CALCULATED.3IONS-SCNC: 14 MMOL/L (ref 5–15)
APTT PPP: 31.9 SECONDS (ref 61–76.5)
APTT PPP: 34.2 SECONDS (ref 24–31)
ARTERIAL PATENCY WRIST A: POSITIVE
AST SERPL-CCNC: 40 U/L (ref 1–40)
ATMOSPHERIC PRESS: ABNORMAL MM[HG]
B PARAPERT DNA SPEC QL NAA+PROBE: NOT DETECTED
B PERT DNA SPEC QL NAA+PROBE: NOT DETECTED
BACTERIA BLD CULT: ABNORMAL
BASE EXCESS BLDA CALC-SCNC: 1.3 MMOL/L (ref 0–3)
BASOPHILS # BLD AUTO: 0 10*3/MM3 (ref 0–0.2)
BASOPHILS # BLD AUTO: 0 10*3/MM3 (ref 0–0.2)
BASOPHILS NFR BLD AUTO: 0.3 % (ref 0–1.5)
BASOPHILS NFR BLD AUTO: 0.3 % (ref 0–1.5)
BDY SITE: ABNORMAL
BILIRUB SERPL-MCNC: 1 MG/DL (ref 0–1.2)
BUN SERPL-MCNC: 30 MG/DL (ref 8–23)
BUN SERPL-MCNC: 32 MG/DL (ref 8–23)
BUN/CREAT SERPL: 21.5 (ref 7–25)
BUN/CREAT SERPL: 22.9 (ref 7–25)
C PNEUM DNA NPH QL NAA+NON-PROBE: NOT DETECTED
CALCIUM SPEC-SCNC: 8.2 MG/DL (ref 8.6–10.5)
CALCIUM SPEC-SCNC: 8.8 MG/DL (ref 8.6–10.5)
CHLORIDE SERPL-SCNC: 100 MMOL/L (ref 98–107)
CHLORIDE SERPL-SCNC: 101 MMOL/L (ref 98–107)
CO2 BLDA-SCNC: 27.9 MMOL/L (ref 22–29)
CO2 SERPL-SCNC: 26 MMOL/L (ref 22–29)
CO2 SERPL-SCNC: 27 MMOL/L (ref 22–29)
CREAT SERPL-MCNC: 1.31 MG/DL (ref 0.76–1.27)
CREAT SERPL-MCNC: 1.49 MG/DL (ref 0.76–1.27)
CRP SERPL-MCNC: 17.52 MG/DL (ref 0–0.5)
D DIMER PPP FEU-MCNC: 0.62 MG/L (FEU) (ref 0–0.59)
D-LACTATE SERPL-SCNC: 1.6 MMOL/L (ref 0.5–2)
DEPRECATED RDW RBC AUTO: 48.1 FL (ref 37–54)
DEPRECATED RDW RBC AUTO: 49.4 FL (ref 37–54)
EOSINOPHIL # BLD AUTO: 0 10*3/MM3 (ref 0–0.4)
EOSINOPHIL # BLD AUTO: 0 10*3/MM3 (ref 0–0.4)
EOSINOPHIL NFR BLD AUTO: 0 % (ref 0.3–6.2)
EOSINOPHIL NFR BLD AUTO: 0.1 % (ref 0.3–6.2)
ERYTHROCYTE [DISTWIDTH] IN BLOOD BY AUTOMATED COUNT: 14.6 % (ref 12.3–15.4)
ERYTHROCYTE [DISTWIDTH] IN BLOOD BY AUTOMATED COUNT: 15.1 % (ref 12.3–15.4)
FERRITIN SERPL-MCNC: 292.4 NG/ML (ref 30–400)
FLUAV H1 2009 PAND RNA NPH QL NAA+PROBE: NOT DETECTED
FLUAV H1 HA GENE NPH QL NAA+PROBE: NOT DETECTED
FLUAV H3 RNA NPH QL NAA+PROBE: NOT DETECTED
FLUAV SUBTYP SPEC NAA+PROBE: NOT DETECTED
FLUBV RNA ISLT QL NAA+PROBE: NOT DETECTED
GFR SERPL CREATININE-BSD FRML MDRD: 47 ML/MIN/1.73
GFR SERPL CREATININE-BSD FRML MDRD: 55 ML/MIN/1.73
GLOBULIN UR ELPH-MCNC: 3.3 GM/DL
GLUCOSE SERPL-MCNC: 129 MG/DL (ref 65–99)
GLUCOSE SERPL-MCNC: 162 MG/DL (ref 65–99)
HADV DNA SPEC NAA+PROBE: NOT DETECTED
HBA1C MFR BLD: 5.9 % (ref 3.5–5.6)
HCO3 BLDA-SCNC: 26.5 MMOL/L (ref 21–28)
HCOV 229E RNA SPEC QL NAA+PROBE: NOT DETECTED
HCOV HKU1 RNA SPEC QL NAA+PROBE: NOT DETECTED
HCOV NL63 RNA SPEC QL NAA+PROBE: NOT DETECTED
HCOV OC43 RNA SPEC QL NAA+PROBE: NOT DETECTED
HCT VFR BLD AUTO: 34.7 % (ref 37.5–51)
HCT VFR BLD AUTO: 39.7 % (ref 37.5–51)
HEMODILUTION: NO
HGB BLD-MCNC: 11.3 G/DL (ref 13–17.7)
HGB BLD-MCNC: 12.8 G/DL (ref 13–17.7)
HMPV RNA NPH QL NAA+NON-PROBE: NOT DETECTED
HOLD SPECIMEN: NORMAL
HOLD SPECIMEN: NORMAL
HPIV1 RNA SPEC QL NAA+PROBE: NOT DETECTED
HPIV2 RNA SPEC QL NAA+PROBE: NOT DETECTED
HPIV3 RNA NPH QL NAA+PROBE: NOT DETECTED
HPIV4 P GENE NPH QL NAA+PROBE: NOT DETECTED
INHALED O2 CONCENTRATION: 100 %
INR PPP: 1.05 (ref 0.93–1.1)
INR PPP: 1.17 (ref 0.93–1.1)
LDH SERPL-CCNC: 425 U/L (ref 135–225)
LYMPHOCYTES # BLD AUTO: 0.2 10*3/MM3 (ref 0.7–3.1)
LYMPHOCYTES # BLD AUTO: 0.3 10*3/MM3 (ref 0.7–3.1)
LYMPHOCYTES NFR BLD AUTO: 2.8 % (ref 19.6–45.3)
LYMPHOCYTES NFR BLD AUTO: 4.1 % (ref 19.6–45.3)
M PNEUMO IGG SER IA-ACNC: NOT DETECTED
MCH RBC QN AUTO: 30.9 PG (ref 26.6–33)
MCH RBC QN AUTO: 31.1 PG (ref 26.6–33)
MCHC RBC AUTO-ENTMCNC: 32.3 G/DL (ref 31.5–35.7)
MCHC RBC AUTO-ENTMCNC: 32.6 G/DL (ref 31.5–35.7)
MCV RBC AUTO: 95.4 FL (ref 79–97)
MCV RBC AUTO: 95.6 FL (ref 79–97)
MODALITY: ABNORMAL
MONOCYTES # BLD AUTO: 0.2 10*3/MM3 (ref 0.1–0.9)
MONOCYTES # BLD AUTO: 0.3 10*3/MM3 (ref 0.1–0.9)
MONOCYTES NFR BLD AUTO: 3.2 % (ref 5–12)
MONOCYTES NFR BLD AUTO: 3.9 % (ref 5–12)
NEUTROPHILS NFR BLD AUTO: 5.7 10*3/MM3 (ref 1.7–7)
NEUTROPHILS NFR BLD AUTO: 6 10*3/MM3 (ref 1.7–7)
NEUTROPHILS NFR BLD AUTO: 91.6 % (ref 42.7–76)
NEUTROPHILS NFR BLD AUTO: 93.7 % (ref 42.7–76)
NRBC BLD AUTO-RTO: 0 /100 WBC (ref 0–0.2)
NRBC BLD AUTO-RTO: 0 /100 WBC (ref 0–0.2)
NT-PROBNP SERPL-MCNC: 783.5 PG/ML (ref 0–900)
PCO2 BLDA: 43.2 MM HG (ref 35–48)
PH BLDA: 7.4 PH UNITS (ref 7.35–7.45)
PLATELET # BLD AUTO: 164 10*3/MM3 (ref 140–450)
PLATELET # BLD AUTO: 183 10*3/MM3 (ref 140–450)
PMV BLD AUTO: 8.4 FL (ref 6–12)
PMV BLD AUTO: 8.5 FL (ref 6–12)
PO2 BLDA: 64.2 MM HG (ref 83–108)
POTASSIUM SERPL-SCNC: 3.9 MMOL/L (ref 3.5–5.2)
POTASSIUM SERPL-SCNC: 3.9 MMOL/L (ref 3.5–5.2)
PROCALCITONIN SERPL-MCNC: 0.39 NG/ML (ref 0–0.25)
PROT SERPL-MCNC: 7.6 G/DL (ref 6–8.5)
PROTHROMBIN TIME: 11.5 SECONDS (ref 9.6–11.7)
PROTHROMBIN TIME: 12.8 SECONDS (ref 9.6–11.7)
QT INTERVAL: 447 MS
RBC # BLD AUTO: 3.63 10*6/MM3 (ref 4.14–5.8)
RBC # BLD AUTO: 4.15 10*6/MM3 (ref 4.14–5.8)
RHINOVIRUS RNA SPEC NAA+PROBE: NOT DETECTED
RSV RNA NPH QL NAA+NON-PROBE: NOT DETECTED
SAO2 % BLDCOA: 91.9 % (ref 94–98)
SODIUM SERPL-SCNC: 138 MMOL/L (ref 136–145)
SODIUM SERPL-SCNC: 141 MMOL/L (ref 136–145)
TROPONIN T SERPL-MCNC: 0.01 NG/ML (ref 0–0.03)
TROPONIN T SERPL-MCNC: <0.01 NG/ML (ref 0–0.03)
WBC # BLD AUTO: 6.1 10*3/MM3 (ref 3.4–10.8)
WBC # BLD AUTO: 6.6 10*3/MM3 (ref 3.4–10.8)
WHOLE BLOOD HOLD SPECIMEN: NORMAL
WHOLE BLOOD HOLD SPECIMEN: NORMAL

## 2020-12-01 PROCEDURE — 94799 UNLISTED PULMONARY SVC/PX: CPT

## 2020-12-01 PROCEDURE — 87040 BLOOD CULTURE FOR BACTERIA: CPT

## 2020-12-01 PROCEDURE — 85730 THROMBOPLASTIN TIME PARTIAL: CPT | Performed by: INTERNAL MEDICINE

## 2020-12-01 PROCEDURE — G0378 HOSPITAL OBSERVATION PER HR: HCPCS

## 2020-12-01 PROCEDURE — 80053 COMPREHEN METABOLIC PANEL: CPT

## 2020-12-01 PROCEDURE — 87150 DNA/RNA AMPLIFIED PROBE: CPT | Performed by: INTERNAL MEDICINE

## 2020-12-01 PROCEDURE — XW033E5 INTRODUCTION OF REMDESIVIR ANTI-INFECTIVE INTO PERIPHERAL VEIN, PERCUTANEOUS APPROACH, NEW TECHNOLOGY GROUP 5: ICD-10-PCS | Performed by: NURSE PRACTITIONER

## 2020-12-01 PROCEDURE — 36600 WITHDRAWAL OF ARTERIAL BLOOD: CPT

## 2020-12-01 PROCEDURE — 87147 CULTURE TYPE IMMUNOLOGIC: CPT | Performed by: INTERNAL MEDICINE

## 2020-12-01 PROCEDURE — 85610 PROTHROMBIN TIME: CPT | Performed by: NURSE PRACTITIONER

## 2020-12-01 PROCEDURE — 94660 CPAP INITIATION&MGMT: CPT

## 2020-12-01 PROCEDURE — 93005 ELECTROCARDIOGRAM TRACING: CPT | Performed by: INTERNAL MEDICINE

## 2020-12-01 PROCEDURE — 25010000002 CEFEPIME PER 500 MG: Performed by: NURSE PRACTITIONER

## 2020-12-01 PROCEDURE — 85610 PROTHROMBIN TIME: CPT | Performed by: PHYSICIAN ASSISTANT

## 2020-12-01 PROCEDURE — 94640 AIRWAY INHALATION TREATMENT: CPT

## 2020-12-01 PROCEDURE — 71045 X-RAY EXAM CHEST 1 VIEW: CPT

## 2020-12-01 PROCEDURE — 85379 FIBRIN DEGRADATION QUANT: CPT | Performed by: STUDENT IN AN ORGANIZED HEALTH CARE EDUCATION/TRAINING PROGRAM

## 2020-12-01 PROCEDURE — 83615 LACTATE (LD) (LDH) ENZYME: CPT | Performed by: STUDENT IN AN ORGANIZED HEALTH CARE EDUCATION/TRAINING PROGRAM

## 2020-12-01 PROCEDURE — 93005 ELECTROCARDIOGRAM TRACING: CPT

## 2020-12-01 PROCEDURE — 25010000002 HEPARIN (PORCINE) 25000-0.45 UT/250ML-% SOLUTION: Performed by: NURSE PRACTITIONER

## 2020-12-01 PROCEDURE — 82803 BLOOD GASES ANY COMBINATION: CPT

## 2020-12-01 PROCEDURE — 82728 ASSAY OF FERRITIN: CPT | Performed by: STUDENT IN AN ORGANIZED HEALTH CARE EDUCATION/TRAINING PROGRAM

## 2020-12-01 PROCEDURE — 84484 ASSAY OF TROPONIN QUANT: CPT | Performed by: PHYSICIAN ASSISTANT

## 2020-12-01 PROCEDURE — 25010000002 DEXAMETHASONE PER 1 MG: Performed by: PHYSICIAN ASSISTANT

## 2020-12-01 PROCEDURE — 25010000002 CEFTRIAXONE PER 250 MG: Performed by: PHYSICIAN ASSISTANT

## 2020-12-01 PROCEDURE — 99291 CRITICAL CARE FIRST HOUR: CPT | Performed by: INTERNAL MEDICINE

## 2020-12-01 PROCEDURE — 85730 THROMBOPLASTIN TIME PARTIAL: CPT | Performed by: PHYSICIAN ASSISTANT

## 2020-12-01 PROCEDURE — 85730 THROMBOPLASTIN TIME PARTIAL: CPT | Performed by: NURSE PRACTITIONER

## 2020-12-01 PROCEDURE — 86140 C-REACTIVE PROTEIN: CPT | Performed by: NURSE PRACTITIONER

## 2020-12-01 PROCEDURE — 83605 ASSAY OF LACTIC ACID: CPT

## 2020-12-01 PROCEDURE — 83036 HEMOGLOBIN GLYCOSYLATED A1C: CPT | Performed by: STUDENT IN AN ORGANIZED HEALTH CARE EDUCATION/TRAINING PROGRAM

## 2020-12-01 PROCEDURE — 83880 ASSAY OF NATRIURETIC PEPTIDE: CPT | Performed by: PHYSICIAN ASSISTANT

## 2020-12-01 PROCEDURE — 0 IOPAMIDOL PER 1 ML: Performed by: PHYSICIAN ASSISTANT

## 2020-12-01 PROCEDURE — 0100U HC BIOFIRE FILMARRAY RESP PANEL 2: CPT | Performed by: STUDENT IN AN ORGANIZED HEALTH CARE EDUCATION/TRAINING PROGRAM

## 2020-12-01 PROCEDURE — 85025 COMPLETE CBC W/AUTO DIFF WBC: CPT | Performed by: STUDENT IN AN ORGANIZED HEALTH CARE EDUCATION/TRAINING PROGRAM

## 2020-12-01 PROCEDURE — 85025 COMPLETE CBC W/AUTO DIFF WBC: CPT

## 2020-12-01 PROCEDURE — 71275 CT ANGIOGRAPHY CHEST: CPT

## 2020-12-01 PROCEDURE — 84145 PROCALCITONIN (PCT): CPT | Performed by: STUDENT IN AN ORGANIZED HEALTH CARE EDUCATION/TRAINING PROGRAM

## 2020-12-01 RX ORDER — SIMETHICONE 125 MG
125 TABLET,CHEWABLE ORAL 3 TIMES DAILY
COMMUNITY

## 2020-12-01 RX ORDER — LACTULOSE 10 G/15ML
20 SOLUTION ORAL DAILY PRN
COMMUNITY

## 2020-12-01 RX ORDER — ASPIRIN 81 MG/1
324 TABLET, CHEWABLE ORAL ONCE
Status: COMPLETED | OUTPATIENT
Start: 2020-12-01 | End: 2020-12-01

## 2020-12-01 RX ORDER — AZITHROMYCIN 250 MG/1
500 TABLET, FILM COATED ORAL
Status: DISCONTINUED | OUTPATIENT
Start: 2020-12-02 | End: 2020-12-01

## 2020-12-01 RX ORDER — ZINC SULFATE 50(220)MG
220 CAPSULE ORAL DAILY
Status: DISCONTINUED | OUTPATIENT
Start: 2020-12-01 | End: 2020-12-01

## 2020-12-01 RX ORDER — LIDOCAINE 50 MG/G
1 PATCH TOPICAL
Status: DISCONTINUED | OUTPATIENT
Start: 2020-12-01 | End: 2020-12-11 | Stop reason: HOSPADM

## 2020-12-01 RX ORDER — FLUTICASONE PROPIONATE 110 UG/1
1 AEROSOL, METERED RESPIRATORY (INHALATION) DAILY
COMMUNITY

## 2020-12-01 RX ORDER — TAMSULOSIN HYDROCHLORIDE 0.4 MG/1
0.4 CAPSULE ORAL DAILY
Status: DISCONTINUED | OUTPATIENT
Start: 2020-12-01 | End: 2020-12-11 | Stop reason: HOSPADM

## 2020-12-01 RX ORDER — ONDANSETRON 4 MG/1
4 TABLET, FILM COATED ORAL EVERY 8 HOURS PRN
COMMUNITY

## 2020-12-01 RX ORDER — CHOLECALCIFEROL (VITAMIN D3) 125 MCG
10 CAPSULE ORAL NIGHTLY
Status: DISCONTINUED | OUTPATIENT
Start: 2020-12-01 | End: 2020-12-11 | Stop reason: HOSPADM

## 2020-12-01 RX ORDER — BISMUTH SUBSALICYLATE 262 MG/1
524 TABLET, CHEWABLE ORAL EVERY 4 HOURS PRN
Status: DISCONTINUED | OUTPATIENT
Start: 2020-12-01 | End: 2020-12-11 | Stop reason: HOSPADM

## 2020-12-01 RX ORDER — MENTHOL 40 MG/ML
GEL TOPICAL EVERY 12 HOURS
COMMUNITY

## 2020-12-01 RX ORDER — BISACODYL 10 MG
10 SUPPOSITORY, RECTAL RECTAL 2 TIMES DAILY PRN
COMMUNITY

## 2020-12-01 RX ORDER — ZINC SULFATE 50(220)MG
220 CAPSULE ORAL DAILY
COMMUNITY
Start: 2020-11-26 | End: 2020-12-10

## 2020-12-01 RX ORDER — FLUTICASONE PROPIONATE 50 MCG
2 SPRAY, SUSPENSION (ML) NASAL DAILY
COMMUNITY

## 2020-12-01 RX ORDER — ACETAMINOPHEN 650 MG/1
650 SUPPOSITORY RECTAL EVERY 4 HOURS PRN
Status: DISCONTINUED | OUTPATIENT
Start: 2020-12-01 | End: 2020-12-11 | Stop reason: HOSPADM

## 2020-12-01 RX ORDER — NITROGLYCERIN 0.4 MG/1
0.4 TABLET SUBLINGUAL
Status: DISCONTINUED | OUTPATIENT
Start: 2020-12-01 | End: 2020-12-11 | Stop reason: HOSPADM

## 2020-12-01 RX ORDER — BUMETANIDE 1 MG/1
1 TABLET ORAL DAILY
Status: DISCONTINUED | OUTPATIENT
Start: 2020-12-01 | End: 2020-12-11 | Stop reason: HOSPADM

## 2020-12-01 RX ORDER — ALBUTEROL SULFATE 90 UG/1
2 AEROSOL, METERED RESPIRATORY (INHALATION)
Status: DISCONTINUED | OUTPATIENT
Start: 2020-12-01 | End: 2020-12-11 | Stop reason: HOSPADM

## 2020-12-01 RX ORDER — LOSARTAN POTASSIUM 50 MG/1
50 TABLET ORAL 2 TIMES DAILY
Status: DISCONTINUED | OUTPATIENT
Start: 2020-12-01 | End: 2020-12-01

## 2020-12-01 RX ORDER — POTASSIUM CHLORIDE 750 MG/1
20 TABLET, EXTENDED RELEASE ORAL EVERY MORNING
COMMUNITY

## 2020-12-01 RX ORDER — BUSPIRONE HYDROCHLORIDE 10 MG/1
15 TABLET ORAL 3 TIMES DAILY
Status: DISCONTINUED | OUTPATIENT
Start: 2020-12-01 | End: 2020-12-11 | Stop reason: HOSPADM

## 2020-12-01 RX ORDER — HYDROCODONE BITARTRATE AND ACETAMINOPHEN 5; 325 MG/1; MG/1
1 TABLET ORAL EVERY 6 HOURS PRN
Status: DISCONTINUED | OUTPATIENT
Start: 2020-12-01 | End: 2020-12-11 | Stop reason: HOSPADM

## 2020-12-01 RX ORDER — ACETAMINOPHEN 325 MG/1
650 TABLET ORAL EVERY 4 HOURS PRN
Status: DISCONTINUED | OUTPATIENT
Start: 2020-12-01 | End: 2020-12-11 | Stop reason: HOSPADM

## 2020-12-01 RX ORDER — METOCLOPRAMIDE 10 MG/1
10 TABLET ORAL
Status: DISCONTINUED | OUTPATIENT
Start: 2020-12-01 | End: 2020-12-11 | Stop reason: HOSPADM

## 2020-12-01 RX ORDER — ASCORBIC ACID 500 MG
500 TABLET ORAL 3 TIMES DAILY
Status: DISCONTINUED | OUTPATIENT
Start: 2020-12-01 | End: 2020-12-01

## 2020-12-01 RX ORDER — ASCORBIC ACID 500 MG
1000 TABLET ORAL 2 TIMES DAILY
COMMUNITY
Start: 2020-11-26 | End: 2020-12-10

## 2020-12-01 RX ORDER — SIMETHICONE 80 MG
125 TABLET,CHEWABLE ORAL 3 TIMES DAILY
Status: DISCONTINUED | OUTPATIENT
Start: 2020-12-01 | End: 2020-12-11 | Stop reason: HOSPADM

## 2020-12-01 RX ORDER — CYCLOBENZAPRINE HCL 5 MG
5 TABLET ORAL 3 TIMES DAILY
COMMUNITY

## 2020-12-01 RX ORDER — SODIUM CHLORIDE 0.9 % (FLUSH) 0.9 %
10 SYRINGE (ML) INJECTION AS NEEDED
Status: DISCONTINUED | OUTPATIENT
Start: 2020-12-01 | End: 2020-12-11 | Stop reason: HOSPADM

## 2020-12-01 RX ORDER — CETIRIZINE HYDROCHLORIDE 10 MG/1
10 TABLET ORAL DAILY
COMMUNITY

## 2020-12-01 RX ORDER — BISACODYL 10 MG
10 SUPPOSITORY, RECTAL RECTAL DAILY PRN
COMMUNITY
End: 2020-12-01

## 2020-12-01 RX ORDER — DOXYCYCLINE 100 MG/1
100 TABLET ORAL EVERY 12 HOURS SCHEDULED
Status: COMPLETED | OUTPATIENT
Start: 2020-12-01 | End: 2020-12-07

## 2020-12-01 RX ORDER — ASCORBIC ACID 500 MG
1000 TABLET ORAL DAILY
Status: DISCONTINUED | OUTPATIENT
Start: 2020-12-01 | End: 2020-12-11 | Stop reason: HOSPADM

## 2020-12-01 RX ORDER — DEXAMETHASONE SODIUM PHOSPHATE 4 MG/ML
6 INJECTION, SOLUTION INTRA-ARTICULAR; INTRALESIONAL; INTRAMUSCULAR; INTRAVENOUS; SOFT TISSUE ONCE
Status: COMPLETED | OUTPATIENT
Start: 2020-12-01 | End: 2020-12-01

## 2020-12-01 RX ORDER — AZITHROMYCIN 250 MG/1
500 TABLET, FILM COATED ORAL ONCE
Status: COMPLETED | OUTPATIENT
Start: 2020-12-01 | End: 2020-12-01

## 2020-12-01 RX ORDER — TRAZODONE HYDROCHLORIDE 50 MG/1
75 TABLET ORAL NIGHTLY
COMMUNITY

## 2020-12-01 RX ORDER — BENZONATATE 100 MG/1
100 CAPSULE ORAL EVERY 8 HOURS PRN
Status: DISCONTINUED | OUTPATIENT
Start: 2020-12-01 | End: 2020-12-11 | Stop reason: HOSPADM

## 2020-12-01 RX ORDER — DEXAMETHASONE SODIUM PHOSPHATE 10 MG/ML
6 INJECTION, SOLUTION INTRAMUSCULAR; INTRAVENOUS DAILY
Status: DISCONTINUED | OUTPATIENT
Start: 2020-12-02 | End: 2020-12-01

## 2020-12-01 RX ORDER — DILTIAZEM HYDROCHLORIDE 120 MG/1
120 CAPSULE, COATED, EXTENDED RELEASE ORAL 3 TIMES DAILY
COMMUNITY

## 2020-12-01 RX ORDER — CETIRIZINE HYDROCHLORIDE 10 MG/1
10 TABLET ORAL NIGHTLY
Status: DISCONTINUED | OUTPATIENT
Start: 2020-12-01 | End: 2020-12-11 | Stop reason: HOSPADM

## 2020-12-01 RX ORDER — BUSPIRONE HYDROCHLORIDE 15 MG/1
15 TABLET ORAL 3 TIMES DAILY
COMMUNITY

## 2020-12-01 RX ORDER — BUMETANIDE 2 MG/1
2 TABLET ORAL DAILY
COMMUNITY
End: 2020-12-10 | Stop reason: HOSPADM

## 2020-12-01 RX ORDER — THIAMINE MONONITRATE (VIT B1) 100 MG
200 TABLET ORAL DAILY
Status: DISCONTINUED | OUTPATIENT
Start: 2020-12-01 | End: 2020-12-11 | Stop reason: HOSPADM

## 2020-12-01 RX ORDER — DILTIAZEM HYDROCHLORIDE 120 MG/1
120 CAPSULE, COATED, EXTENDED RELEASE ORAL DAILY
Status: DISCONTINUED | OUTPATIENT
Start: 2020-12-01 | End: 2020-12-11 | Stop reason: HOSPADM

## 2020-12-01 RX ORDER — ATORVASTATIN CALCIUM 40 MG/1
40 TABLET, FILM COATED ORAL DAILY
Status: DISCONTINUED | OUTPATIENT
Start: 2020-12-01 | End: 2020-12-11 | Stop reason: HOSPADM

## 2020-12-01 RX ORDER — CHOLECALCIFEROL (VITAMIN D3) 125 MCG
5 CAPSULE ORAL NIGHTLY PRN
Status: DISCONTINUED | OUTPATIENT
Start: 2020-12-01 | End: 2020-12-11 | Stop reason: HOSPADM

## 2020-12-01 RX ORDER — POLYETHYLENE GLYCOL 3350 17 G/17G
17 POWDER, FOR SOLUTION ORAL DAILY PRN
COMMUNITY

## 2020-12-01 RX ORDER — POTASSIUM CHLORIDE 7.45 MG/ML
10 INJECTION INTRAVENOUS
Status: DISCONTINUED | OUTPATIENT
Start: 2020-12-01 | End: 2020-12-11 | Stop reason: HOSPADM

## 2020-12-01 RX ORDER — GUAIFENESIN 600 MG/1
600 TABLET, EXTENDED RELEASE ORAL 2 TIMES DAILY
COMMUNITY
End: 2020-12-10 | Stop reason: HOSPADM

## 2020-12-01 RX ORDER — GUAIFENESIN 600 MG/1
600 TABLET, EXTENDED RELEASE ORAL EVERY 12 HOURS SCHEDULED
Status: DISCONTINUED | OUTPATIENT
Start: 2020-12-01 | End: 2020-12-11 | Stop reason: HOSPADM

## 2020-12-01 RX ORDER — ALBUTEROL SULFATE 2.5 MG/3ML
2.5 SOLUTION RESPIRATORY (INHALATION) EVERY 8 HOURS PRN
COMMUNITY

## 2020-12-01 RX ORDER — MONTELUKAST SODIUM 10 MG/1
10 TABLET ORAL NIGHTLY
COMMUNITY

## 2020-12-01 RX ORDER — ESCITALOPRAM OXALATE 5 MG/1
5 TABLET ORAL DAILY
COMMUNITY

## 2020-12-01 RX ORDER — HEPARIN SODIUM 10000 [USP'U]/100ML
14.4 INJECTION, SOLUTION INTRAVENOUS
Status: DISCONTINUED | OUTPATIENT
Start: 2020-12-01 | End: 2020-12-03

## 2020-12-01 RX ORDER — MONTELUKAST SODIUM 10 MG/1
10 TABLET ORAL NIGHTLY
Status: DISCONTINUED | OUTPATIENT
Start: 2020-12-01 | End: 2020-12-11 | Stop reason: HOSPADM

## 2020-12-01 RX ORDER — BENZONATATE 100 MG/1
100 CAPSULE ORAL EVERY 8 HOURS PRN
COMMUNITY

## 2020-12-01 RX ORDER — BISMUTH SUBSALICYLATE 262 MG/1
524 TABLET, CHEWABLE ORAL EVERY 4 HOURS PRN
COMMUNITY

## 2020-12-01 RX ORDER — POTASSIUM CHLORIDE 20 MEQ/1
40 TABLET, EXTENDED RELEASE ORAL AS NEEDED
Status: DISCONTINUED | OUTPATIENT
Start: 2020-12-01 | End: 2020-12-11 | Stop reason: HOSPADM

## 2020-12-01 RX ORDER — CLONIDINE HYDROCHLORIDE 0.1 MG/1
0.1 TABLET ORAL 2 TIMES DAILY
COMMUNITY

## 2020-12-01 RX ORDER — ESCITALOPRAM OXALATE 10 MG/1
5 TABLET ORAL DAILY
Status: DISCONTINUED | OUTPATIENT
Start: 2020-12-01 | End: 2020-12-11 | Stop reason: HOSPADM

## 2020-12-01 RX ORDER — DEXTROMETHORPHAN POLISTIREX 30 MG/5ML
5 SUSPENSION ORAL EVERY 6 HOURS PRN
COMMUNITY

## 2020-12-01 RX ORDER — SODIUM CHLORIDE 0.9 % (FLUSH) 0.9 %
10 SYRINGE (ML) INJECTION EVERY 12 HOURS SCHEDULED
Status: DISCONTINUED | OUTPATIENT
Start: 2020-12-01 | End: 2020-12-11 | Stop reason: HOSPADM

## 2020-12-01 RX ORDER — LANOLIN ALCOHOL/MO/W.PET/CERES
6 CREAM (GRAM) TOPICAL NIGHTLY
COMMUNITY

## 2020-12-01 RX ORDER — POLYETHYLENE GLYCOL 3350 17 G/17G
17 POWDER, FOR SOLUTION ORAL DAILY PRN
Status: DISCONTINUED | OUTPATIENT
Start: 2020-12-01 | End: 2020-12-11 | Stop reason: HOSPADM

## 2020-12-01 RX ORDER — MAGNESIUM SULFATE 1 G/100ML
1 INJECTION INTRAVENOUS AS NEEDED
Status: DISCONTINUED | OUTPATIENT
Start: 2020-12-01 | End: 2020-12-11 | Stop reason: HOSPADM

## 2020-12-01 RX ORDER — ACETAMINOPHEN 325 MG/1
650 TABLET ORAL EVERY 6 HOURS PRN
COMMUNITY

## 2020-12-01 RX ORDER — ONDANSETRON 2 MG/ML
4 INJECTION INTRAMUSCULAR; INTRAVENOUS EVERY 6 HOURS PRN
Status: DISCONTINUED | OUTPATIENT
Start: 2020-12-01 | End: 2020-12-11 | Stop reason: HOSPADM

## 2020-12-01 RX ORDER — DOCUSATE SODIUM 100 MG/1
100 CAPSULE, LIQUID FILLED ORAL 3 TIMES DAILY
Status: DISCONTINUED | OUTPATIENT
Start: 2020-12-01 | End: 2020-12-11 | Stop reason: HOSPADM

## 2020-12-01 RX ORDER — FAMOTIDINE 40 MG/1
40 TABLET, FILM COATED ORAL 2 TIMES DAILY
COMMUNITY

## 2020-12-01 RX ORDER — CLONIDINE HYDROCHLORIDE 0.1 MG/1
0.1 TABLET ORAL EVERY 12 HOURS SCHEDULED
Status: DISCONTINUED | OUTPATIENT
Start: 2020-12-01 | End: 2020-12-01

## 2020-12-01 RX ORDER — DOCUSATE SODIUM 100 MG/1
100 CAPSULE, LIQUID FILLED ORAL 3 TIMES DAILY
COMMUNITY

## 2020-12-01 RX ORDER — POTASSIUM CHLORIDE 20 MEQ/1
20 TABLET, EXTENDED RELEASE ORAL
Status: DISCONTINUED | OUTPATIENT
Start: 2020-12-01 | End: 2020-12-11 | Stop reason: HOSPADM

## 2020-12-01 RX ORDER — AMMONIUM LACTATE 12 G/100G
LOTION TOPICAL EVERY 12 HOURS
COMMUNITY

## 2020-12-01 RX ORDER — ACETAMINOPHEN 160 MG/5ML
650 SOLUTION ORAL EVERY 4 HOURS PRN
Status: DISCONTINUED | OUTPATIENT
Start: 2020-12-01 | End: 2020-12-11 | Stop reason: HOSPADM

## 2020-12-01 RX ORDER — TRAZODONE HYDROCHLORIDE 50 MG/1
75 TABLET ORAL NIGHTLY
Status: DISCONTINUED | OUTPATIENT
Start: 2020-12-01 | End: 2020-12-11 | Stop reason: HOSPADM

## 2020-12-01 RX ORDER — CLONIDINE HYDROCHLORIDE 0.1 MG/1
0.05 TABLET ORAL EVERY 12 HOURS SCHEDULED
Status: DISCONTINUED | OUTPATIENT
Start: 2020-12-01 | End: 2020-12-05

## 2020-12-01 RX ORDER — ZINC SULFATE 50(220)MG
220 CAPSULE ORAL 2 TIMES DAILY
Status: DISCONTINUED | OUTPATIENT
Start: 2020-12-01 | End: 2020-12-11 | Stop reason: HOSPADM

## 2020-12-01 RX ORDER — ACETAMINOPHEN 325 MG/1
650 TABLET ORAL EVERY 6 HOURS PRN
Status: DISCONTINUED | OUTPATIENT
Start: 2020-12-01 | End: 2020-12-11 | Stop reason: HOSPADM

## 2020-12-01 RX ORDER — MAGNESIUM SULFATE HEPTAHYDRATE 40 MG/ML
2 INJECTION, SOLUTION INTRAVENOUS AS NEEDED
Status: DISCONTINUED | OUTPATIENT
Start: 2020-12-01 | End: 2020-12-11 | Stop reason: HOSPADM

## 2020-12-01 RX ORDER — BUMETANIDE 1 MG/1
1 TABLET ORAL DAILY
COMMUNITY

## 2020-12-01 RX ORDER — SODIUM PHOSPHATE, DIBASIC AND SODIUM PHOSPHATE, MONOBASIC 7; 19 G/133ML; G/133ML
1 ENEMA RECTAL DAILY PRN
Status: DISCONTINUED | OUTPATIENT
Start: 2020-12-01 | End: 2020-12-11 | Stop reason: HOSPADM

## 2020-12-01 RX ORDER — ONDANSETRON 4 MG/1
4 TABLET, FILM COATED ORAL EVERY 6 HOURS PRN
Status: DISCONTINUED | OUTPATIENT
Start: 2020-12-01 | End: 2020-12-11 | Stop reason: HOSPADM

## 2020-12-01 RX ORDER — LOSARTAN POTASSIUM 100 MG/1
50 TABLET ORAL 2 TIMES DAILY
COMMUNITY

## 2020-12-01 RX ORDER — POTASSIUM CHLORIDE 1.5 G/1.77G
40 POWDER, FOR SOLUTION ORAL AS NEEDED
Status: DISCONTINUED | OUTPATIENT
Start: 2020-12-01 | End: 2020-12-11 | Stop reason: HOSPADM

## 2020-12-01 RX ORDER — IPRATROPIUM BROMIDE AND ALBUTEROL SULFATE 2.5; .5 MG/3ML; MG/3ML
3 SOLUTION RESPIRATORY (INHALATION) EVERY 4 HOURS PRN
COMMUNITY

## 2020-12-01 RX ORDER — HYDROCODONE BITARTRATE AND ACETAMINOPHEN 5; 325 MG/1; MG/1
1 TABLET ORAL EVERY 6 HOURS PRN
COMMUNITY

## 2020-12-01 RX ORDER — SODIUM PHOSPHATE, DIBASIC AND SODIUM PHOSPHATE, MONOBASIC 7; 19 G/133ML; G/133ML
ENEMA RECTAL DAILY PRN
COMMUNITY

## 2020-12-01 RX ORDER — METOCLOPRAMIDE 10 MG/1
10 TABLET ORAL
COMMUNITY

## 2020-12-01 RX ORDER — CYCLOBENZAPRINE HCL 10 MG
5 TABLET ORAL 3 TIMES DAILY
Status: DISCONTINUED | OUTPATIENT
Start: 2020-12-01 | End: 2020-12-11 | Stop reason: HOSPADM

## 2020-12-01 RX ORDER — LIDOCAINE 4 G/G
1 PATCH TOPICAL DAILY
COMMUNITY

## 2020-12-01 RX ORDER — PANTOPRAZOLE SODIUM 40 MG/1
40 TABLET, DELAYED RELEASE ORAL
Status: DISCONTINUED | OUTPATIENT
Start: 2020-12-02 | End: 2020-12-11 | Stop reason: HOSPADM

## 2020-12-01 RX ORDER — LACTULOSE 10 G/15ML
20 SOLUTION ORAL DAILY PRN
Status: DISCONTINUED | OUTPATIENT
Start: 2020-12-01 | End: 2020-12-11 | Stop reason: HOSPADM

## 2020-12-01 RX ORDER — TAMSULOSIN HYDROCHLORIDE 0.4 MG/1
1 CAPSULE ORAL DAILY
COMMUNITY

## 2020-12-01 RX ORDER — NITROGLYCERIN 0.4 MG/1
0.4 TABLET SUBLINGUAL
COMMUNITY

## 2020-12-01 RX ORDER — BUDESONIDE AND FORMOTEROL FUMARATE DIHYDRATE 160; 4.5 UG/1; UG/1
2 AEROSOL RESPIRATORY (INHALATION)
Status: DISCONTINUED | OUTPATIENT
Start: 2020-12-01 | End: 2020-12-11 | Stop reason: HOSPADM

## 2020-12-01 RX ORDER — AMMONIUM LACTATE 12 G/100G
LOTION TOPICAL EVERY 12 HOURS
Status: DISCONTINUED | OUTPATIENT
Start: 2020-12-01 | End: 2020-12-11 | Stop reason: HOSPADM

## 2020-12-01 RX ORDER — MELATONIN
1000 DAILY
Status: DISCONTINUED | OUTPATIENT
Start: 2020-12-01 | End: 2020-12-01

## 2020-12-01 RX ADMIN — CEFEPIME HYDROCHLORIDE 2 G: 2 INJECTION, POWDER, FOR SOLUTION INTRAVENOUS at 13:36

## 2020-12-01 RX ADMIN — DOCUSATE SODIUM 100 MG: 100 CAPSULE, LIQUID FILLED ORAL at 20:35

## 2020-12-01 RX ADMIN — DEXAMETHASONE SODIUM PHOSPHATE 6 MG: 4 INJECTION, SOLUTION INTRAMUSCULAR; INTRAVENOUS at 03:00

## 2020-12-01 RX ADMIN — Medication 200 MG: at 20:34

## 2020-12-01 RX ADMIN — Medication 600 MG: at 13:37

## 2020-12-01 RX ADMIN — SIMETHICONE 120 MG: 80 TABLET, CHEWABLE ORAL at 20:38

## 2020-12-01 RX ADMIN — GUAIFENESIN 600 MG: 600 TABLET, EXTENDED RELEASE ORAL at 20:39

## 2020-12-01 RX ADMIN — ASPIRIN 81 MG CHEWABLE TABLET 324 MG: 81 TABLET CHEWABLE at 01:39

## 2020-12-01 RX ADMIN — METOCLOPRAMIDE 10 MG: 10 TABLET ORAL at 15:58

## 2020-12-01 RX ADMIN — ALBUTEROL SULFATE 2 PUFF: 108 AEROSOL, METERED RESPIRATORY (INHALATION) at 20:28

## 2020-12-01 RX ADMIN — Medication 600 MG: at 20:35

## 2020-12-01 RX ADMIN — TRAZODONE HYDROCHLORIDE 75 MG: 50 TABLET ORAL at 20:37

## 2020-12-01 RX ADMIN — ESCITALOPRAM OXALATE 5 MG: 10 TABLET ORAL at 13:36

## 2020-12-01 RX ADMIN — ZINC SULFATE 220 MG (50 MG) CAPSULE 220 MG: CAPSULE at 20:35

## 2020-12-01 RX ADMIN — BUSPIRONE HYDROCHLORIDE 15 MG: 10 TABLET ORAL at 15:57

## 2020-12-01 RX ADMIN — BUMETANIDE 1 MG: 1 TABLET ORAL at 13:37

## 2020-12-01 RX ADMIN — OXYCODONE HYDROCHLORIDE AND ACETAMINOPHEN 1000 MG: 500 TABLET ORAL at 13:37

## 2020-12-01 RX ADMIN — HEPARIN SODIUM 14.4 UNITS/KG/HR: 10000 INJECTION, SOLUTION INTRAVENOUS at 14:30

## 2020-12-01 RX ADMIN — CYCLOBENZAPRINE HYDROCHLORIDE 5 MG: 10 TABLET, FILM COATED ORAL at 15:57

## 2020-12-01 RX ADMIN — POTASSIUM CHLORIDE 20 MEQ: 1500 TABLET, EXTENDED RELEASE ORAL at 13:36

## 2020-12-01 RX ADMIN — CETIRIZINE HYDROCHLORIDE 10 MG: 10 TABLET, FILM COATED ORAL at 20:34

## 2020-12-01 RX ADMIN — MONTELUKAST 10 MG: 10 TABLET, FILM COATED ORAL at 20:39

## 2020-12-01 RX ADMIN — HEPARIN SODIUM 18.4 UNITS/KG/HR: 10000 INJECTION, SOLUTION INTRAVENOUS at 22:16

## 2020-12-01 RX ADMIN — Medication 10 ML: at 13:38

## 2020-12-01 RX ADMIN — AZITHROMYCIN DIHYDRATE 500 MG: 250 TABLET, FILM COATED ORAL at 01:39

## 2020-12-01 RX ADMIN — SIMETHICONE 120 MG: 80 TABLET, CHEWABLE ORAL at 15:57

## 2020-12-01 RX ADMIN — HYDROCODONE BITARTRATE AND ACETAMINOPHEN 1 TABLET: 5; 325 TABLET ORAL at 20:35

## 2020-12-01 RX ADMIN — DILTIAZEM HYDROCHLORIDE 120 MG: 120 CAPSULE, COATED, EXTENDED RELEASE ORAL at 13:37

## 2020-12-01 RX ADMIN — Medication 10 MG: at 20:38

## 2020-12-01 RX ADMIN — CYCLOBENZAPRINE HYDROCHLORIDE 5 MG: 10 TABLET, FILM COATED ORAL at 20:34

## 2020-12-01 RX ADMIN — BUSPIRONE HYDROCHLORIDE 15 MG: 10 TABLET ORAL at 20:36

## 2020-12-01 RX ADMIN — SODIUM CHLORIDE 200 MG: 9 INJECTION, SOLUTION INTRAVENOUS at 13:36

## 2020-12-01 RX ADMIN — CLONIDINE HYDROCHLORIDE 0.05 MG: 0.1 TABLET ORAL at 20:35

## 2020-12-01 RX ADMIN — TAMSULOSIN HYDROCHLORIDE 0.4 MG: 0.4 CAPSULE ORAL at 13:36

## 2020-12-01 RX ADMIN — DOXYCYCLINE 100 MG: 100 TABLET, FILM COATED ORAL at 20:35

## 2020-12-01 RX ADMIN — CEFTRIAXONE SODIUM 2 G: 10 INJECTION, POWDER, FOR SOLUTION INTRAVENOUS at 01:39

## 2020-12-01 RX ADMIN — CEFEPIME HYDROCHLORIDE 2 G: 2 INJECTION, POWDER, FOR SOLUTION INTRAVENOUS at 20:33

## 2020-12-01 RX ADMIN — ALBUTEROL SULFATE 2 PUFF: 108 AEROSOL, METERED RESPIRATORY (INHALATION) at 14:49

## 2020-12-01 RX ADMIN — Medication 10 ML: at 20:40

## 2020-12-01 RX ADMIN — DOCUSATE SODIUM 100 MG: 100 CAPSULE, LIQUID FILLED ORAL at 15:57

## 2020-12-01 RX ADMIN — Medication 5000 UNITS: at 13:37

## 2020-12-01 RX ADMIN — ZINC SULFATE 220 MG (50 MG) CAPSULE 220 MG: CAPSULE at 13:37

## 2020-12-01 RX ADMIN — DOXYCYCLINE 100 MG: 100 TABLET, FILM COATED ORAL at 13:37

## 2020-12-01 RX ADMIN — BUDESONIDE AND FORMOTEROL FUMARATE DIHYDRATE 2 PUFF: 160; 4.5 AEROSOL RESPIRATORY (INHALATION) at 20:28

## 2020-12-01 RX ADMIN — IOPAMIDOL 100 ML: 755 INJECTION, SOLUTION INTRAVENOUS at 02:18

## 2020-12-01 RX ADMIN — ATORVASTATIN CALCIUM 40 MG: 40 TABLET, FILM COATED ORAL at 16:48

## 2020-12-01 NOTE — ED PROVIDER NOTES
Subjective   Chief Complaint: Shortness of breath    Patient is a 65-year-old  male with history of COPD, A. fib, CAD, CKD who lives at [facility] brought to the ER per EMS with chief complaint of difficulty breathing and hypoxia.  Per EMS, nursing care facility states SPO2 was 69% on room air, they placed him on 2 L nasal cannula and it improved to 74%.  EMS states they placed him on nonrebreather and oxygen saturation improved.  Patient complains of some left-sided chest tightness and shortness of breath.  Patient states he has felt short of breath for last 3 days.  He reports mild nonproductive cough, no headache or dizziness.  Patient reports some abdominal fullness, states he has felt constipated for the last 3 days.  He denies any nausea vomiting or diarrhea.  Denies any dysuria.  Per EMS and assisted care facility, patient tested positive for Covid 11/23/2020.  Patient denies any fever or chills.  Patient states he only wears oxygen at nighttime.    Location: Chest    Quality: Tightness    Duration: Today    Timing: Constant    Severity:    Associated Symptoms: Cough, chest pain, shortness of breath    PCP: None      History provided by:  Patient      Review of Systems   Constitutional: Negative for chills and fever.   HENT: Negative for congestion, sore throat and trouble swallowing.    Respiratory: Positive for chest tightness and shortness of breath. Negative for wheezing.    Cardiovascular: Positive for chest pain. Negative for palpitations and leg swelling.   Gastrointestinal: Positive for constipation. Negative for abdominal pain, diarrhea, nausea and vomiting.   Genitourinary: Negative for dysuria.   Musculoskeletal: Negative for myalgias.   Skin: Negative for rash.   Neurological: Negative for weakness and headaches.   Psychiatric/Behavioral: Negative for behavioral problems.   All other systems reviewed and are negative.      No past medical history on file.    No Known  Allergies    No past surgical history on file.    No family history on file.    Social History     Socioeconomic History   • Marital status: Unknown     Spouse name: Not on file   • Number of children: Not on file   • Years of education: Not on file   • Highest education level: Not on file           Objective   Physical Exam  Vitals signs and nursing note reviewed.   Constitutional:       Appearance: He is well-developed and normal weight. He is ill-appearing. He is not toxic-appearing or diaphoretic.      Comments: Awake, alert, labored breathing, belly breathing, chronically ill in appearance   HENT:      Head: Normocephalic and atraumatic.      Mouth/Throat:      Mouth: Mucous membranes are moist.   Eyes:      Extraocular Movements: Extraocular movements intact.      Pupils: Pupils are equal, round, and reactive to light.   Neck:      Musculoskeletal: Normal range of motion and neck supple.   Cardiovascular:      Rate and Rhythm: Normal rate. Rhythm irregular.      Heart sounds: No murmur.   Pulmonary:      Effort: Tachypnea and accessory muscle usage present. No respiratory distress.      Breath sounds: No stridor. Examination of the right-lower field reveals wheezing. Examination of the left-lower field reveals wheezing. Decreased breath sounds and wheezing present.   Chest:      Chest wall: No deformity, tenderness or crepitus.   Abdominal:      Palpations: Abdomen is soft.      Comments: Abdomen distended, ventral hernia present  No rigidity, no rebounding, no guarding   Musculoskeletal: Normal range of motion.      Right lower leg: No edema.      Left lower leg: No edema.   Lymphadenopathy:      Cervical: No cervical adenopathy.   Skin:     General: Skin is warm.      Capillary Refill: Capillary refill takes less than 2 seconds.      Comments: Extremities somewhat mottled   Neurological:      General: No focal deficit present.      Mental Status: He is alert and oriented to person, place, and time.       "Motor: No weakness.   Psychiatric:         Mood and Affect: Mood normal.         Behavior: Behavior normal.         ECG 12 Lead      Date/Time: 12/1/2020 12:30 AM  Performed by: Jackelyn Collado PA  Authorized by: Emergency, Triage Protocol, MD   Interpreted by physician (Bandar)  Previous ECG: no previous ECG available  Rhythm: atrial fibrillation  Rate: normal  BPM: 91  Conduction: conduction normal  ST Segments: ST segments normal  T Waves: T waves normal  Other: no other findings  Clinical impression: abnormal ECG                 ED Course  ED Course as of Dec 01 0256   Tue Dec 01, 2020   0116 Chest x-ray reviewed by Dr. Ryne Portillo, suspect new right lower lobe basilar infiltrate.  Will give Rocephin and azithromycin    [MM]   0232 Spoke with the radiologist, Dr. Lou who reported possible subacute PE within the distal main pulmonary artery on the left extending to into labial area of the left lower lobe    [MM]      ED Course User Index  [MM] Jackelyn Collado PA    /65   Pulse 90   Temp 98.4 °F (36.9 °C) (Oral)   Resp 25   Ht 180.3 cm (71\")   Wt 104 kg (230 lb)   SpO2 98%   BMI 32.08 kg/m²   Labs Reviewed   COMPREHENSIVE METABOLIC PANEL - Abnormal; Notable for the following components:       Result Value    Glucose 162 (*)     BUN 32 (*)     Creatinine 1.49 (*)     eGFR Non  Amer 47 (*)     All other components within normal limits    Narrative:     GFR Normal >60  Chronic Kidney Disease <60  Kidney Failure <15     CBC WITH AUTO DIFFERENTIAL - Abnormal; Notable for the following components:    Hemoglobin 12.8 (*)     Neutrophil % 91.6 (*)     Lymphocyte % 4.1 (*)     Monocyte % 3.9 (*)     Eosinophil % 0.1 (*)     Lymphocytes, Absolute 0.30 (*)     All other components within normal limits   BLOOD GAS, ARTERIAL - Abnormal; Notable for the following components:    pO2, Arterial 64.2 (*)     O2 Saturation, Arterial 91.9 (*)     All other components within normal limits   TROPONIN " (IN-HOUSE) - Normal    Narrative:     Troponin T Reference Range:  <= 0.03 ng/mL-   Negative for AMI  >0.03 ng/mL-     Abnormal for myocardial necrosis.  Clinicians would have to utilize clinical acumen, EKG, Troponin and serial changes to determine if it is an Acute Myocardial Infarction or myocardial injury due to an underlying chronic condition.       Results may be falsely decreased if patient taking Biotin.     BNP (IN-HOUSE) - Normal    Narrative:     Among patients with dyspnea, NT-proBNP is highly sensitive for the detection of acute congestive heart failure. In addition NT-proBNP of <300 pg/ml effectively rules out acute congestive heart failure with 99% negative predictive value.    Results may be falsely decreased if patient taking Biotin.     POC LACTATE - Normal   BLOOD CULTURE   BLOOD CULTURE   RAINBOW DRAW    Narrative:     The following orders were created for panel order [place] Draw.  Procedure                               Abnormality         Status                     ---------                               -----------         ------                     Light Blue Top[[id number]]                                   Final result               Green Top (Gel)[[id number]]                                  Final result               Lavender Top[[id number]]                                     Final result               Gold Top - SST[[id number]]                                   Final result                 Please view results for these tests on the individual orders.   TROPONIN (IN-HOUSE)   PROTIME-INR   APTT   CBC AND DIFFERENTIAL    Narrative:     The following orders were created for panel order CBC & Differential.  Procedure                               Abnormality         Status                     ---------                               -----------         ------                     CBC Auto Differential[[id number]]        Abnormal            Final result                 Please view results for these  tests on the individual orders.   LIGHT BLUE TOP   GREEN TOP   LAVENDER TOP   GOLD TOP - SST     Medications   sodium chloride 0.9 % flush 10 mL (has no administration in time range)   sodium chloride 0.9 % flush 10 mL (has no administration in time range)   dexamethasone (DECADRON) injection 6 mg (has no administration in time range)   aspirin chewable tablet 324 mg (324 mg Oral Given 12/1/20 0139)   cefTRIAXone (ROCEPHIN) in SWFI 2 GRAMS/20ml IV PUSH syringe (2 g Intravenous Given 12/1/20 0139)   azithromycin (ZITHROMAX) tablet 500 mg (500 mg Oral Given 12/1/20 0139)   iopamidol (ISOVUE-370) 76 % injection 100 mL (100 mL Intravenous Given 12/1/20 0218)     Xr Chest 1 View    Result Date: 12/1/2020  Diffuse bilateral infiltrates consistent with multifocal pneumonia. Electronically signed by:  Naveed Welch M.D.  12/1/2020 12:26 AM    Ct Chest Pulmonary Embolism    Result Date: 12/1/2020  1. Likely subacute pulmonary embolus within the distal main pulmonary artery on the left extending into the interlobar artery of the left lower lobe. 2. No evidence of thoracic aortic aneurysm or dissection. 3. Findings throughout the lungs likely related to a Covid pneumonia given the patient's clinical history. Follow-up to clearing recommended. Likely area of rounded atelectasis, left lower lobe. 4. Emphysema. These critical findings were discussed with JT Kimball Electronically signed by:  Costa Lou D.O.  12/1/2020 12:34 AM                                           MDM  Number of Diagnoses or Management Options  Clinical diagnosis of COVID-19:   Dyspnea, unspecified type:   Hypoxia:   Pneumonia of both lungs due to infectious organism, unspecified part of lung:   Renal insufficiency:   Subacute pulmonary embolism (CMS/HCC):   Diagnosis management comments: MEDICAL DECISION  Epic Chart Review: No recent hospital admissions  Comorbidities: COPD, A. fib, hypertension, CKD, heart failure, anemia, CAD, history of  TIA  Differentials: Pulmonary embolism, pneumonia, COVID-19, COPD exacerbation; this list is not all inclusive and does not constitute the entirety of considered causes  Radiology interpretation:  Images reviewed by me and interpreted by radiologist,   Chest x-ray reviewed by myself, interpreted by Dr. Ryne nick, patchy basilar infiltrate right lower lobe suspect developing pneumonia  CT PE protocol  Lab interpretation:  Labs viewed by me significant for, ABG essentially normal, pH normal 7.396, O2 64.2.  POC lactate 1.6.  Blood cultures pending.  CMP glucose 162, BUN 32, creatinine 1.49.  CBC essentially normal.  BMP normal 783.5.  EKG interpretation: Reviewed by self, interpreted by Dr. Nick, atrial fibrillation rate of 91, old anterior infarct, prolonged QT interval, no acute ST changes    While in the ED IV was placed and labs were obtained appropriate PPE was worn during exam and throughout all encounters with the patient. Patient had the above evaluation. Patient arrived per EMS with reported O2 sats in the 80s on facemask.  Patient had obvious increased respiratory effort, belly breathing and extremities appear slightly mottled.  Patient complains of some chest tightness and shortness of breath.  ABG relatively unremarkable with exception of PaO2 65.  Patient was placed on BiPAP.  Patient's respiratory effort improved, SPO2 98%.  Patient triggered simple sepsis protocol, this was initiated.  Lactate 1.6, blood cultures pending.  However essentially normal troponin, BMP, CBC.  Chemistries significant for renal insufficiency, patient has known CKD.  Chest x-ray reviewed by myself, interpreted by Dr. Nick patchy infiltrates suspected right lower lobe pneumonia.  Patient was given Rocephin and azithromycin while in the ER. EKG atrial fibrillation with a rate of 91 with no acute ST changes.  Based on hypoxia CT PE protocol was ordered.  Results were discussed with Dr. Lou who reported likely subacute  pulmonary embolus within the distal main pulmonary artery on the right extending into the interlobar artery of the lower lobe.  On my evaluation patient signs are stable, patient is in no acute respiratory distress.  Patient currently on Xarelto for A. Fib.  Patient will be admitted for observation, oxygen supplementation for hypoxia, dyspnea associated with Covid pneumonia.  Discussed plan of care with Dr. Portillo who agreed with admission.  Consult with hospitalist, spoke with VAMSI Short who agreed to see patient with Dr. Rodriguez.  Discussed anticoagulation with ENT regarding subacute PE and patient already on Eliquis.  She states that she will evaluate patient labs and place orders herself.  Order was placed for Decadron 6 mg.           Amount and/or Complexity of Data Reviewed  Clinical lab tests: reviewed and ordered  Tests in the radiology section of CPT®: reviewed and ordered  Tests in the medicine section of CPT®: reviewed    Patient Progress  Patient progress: stable      Final diagnoses:   Dyspnea, unspecified type   Hypoxia   Clinical diagnosis of COVID-19   Pneumonia of both lungs due to infectious organism, unspecified part of lung   Renal insufficiency   Subacute pulmonary embolism (CMS/LTAC, located within [facility])            Jackelyn Collado PA  12/01/20 0256

## 2020-12-01 NOTE — H&P
[facility] Medicine Services      Patient Name: Gordon Dowling  : 1955  MRN: [id number]  Primary Care Physician: Ba Loaiza MD  Date of admission: 2020    Patient Care Team:  Ba Loaiza MD as PCP - General (Geriatric Medicine)          Subjective   History Present Illness     Chief Complaint:   Chief Complaint   Patient presents with   • Shortness of Breath       HPI limited due to patient on Bipap mask, mostly taken from medical record review and ER documentation    Mr. Dowling is a 65 y.o. male with PMH of COPD on oxygen nocturnally, HTN, CAD, A.fib on Xarelto, CKD, CVA, GERD, anemia, and insomnia who presented to [facility] ER 20 from [facility] with report per EMS of shortness of breath and hypoxia. He was reported to have O2 saturation 69% on room air. He was placed on 2L O2 via NC and had O2 saturation 74%. He was placed on non-rebreather by EMS. He reported some left sided chest pain, cough. Per ER he had no fever, nausea, vomiting, or diarrhea but reported constipation. The patient tested positive at his facility on 2020 per ER documentation. He normally wears O2 at night.     In the ER patient had CT PE protocol that showed likely subacute pulmonary embolus within the distal main pulmonary artery on the left extending into the interlobar artery of the left lower lobe. Findings throughout the lungs likely related to Covid pneumonia. Emphysema. ABG showed ph 7.39, CO2 43.2, O2 64, HCO3 26.5 on 100% non-rebreather. He was placed on Bipap. WBC normal, temp 99.2. Respiratory viral panel WITHOUT Covid was negative. Troponin negative x2. Blood cultures were drawn. He was given IV rocephin, zithromax, decadron, 324mg aspirin, started treatment lovenox. He was admitted to the PCU for further treatment.         Review of Systems   Constitution: Positive for fever and malaise/fatigue.   HENT: Positive for congestion.    Eyes: Negative.    Cardiovascular:  Positive for chest pain.   Respiratory: Positive for cough and shortness of breath.    Endocrine: Negative.    Hematologic/Lymphatic: Negative.    Skin: Negative.    Musculoskeletal: Negative.    Gastrointestinal: Negative.    Genitourinary: Negative.    Neurological: Negative.    Psychiatric/Behavioral: Negative.    Allergic/Immunologic: Negative.    All other systems reviewed and are negative.          Personal History     Past Medical History:   Past Medical History:   Diagnosis Date   • Afib (CMS/[facility])    • Anemia    • BPH (benign prostatic hyperplasia)    • CAD (coronary artery disease)    • COPD (chronic obstructive pulmonary disease) (CMS/[facility])    • GERD (gastroesophageal reflux disease)    • Hypertension    • Insomnia    • Mood disorder (CMS/[facility])    • Renal disease    • Stroke (CMS/[facility])        Surgical History:    History reviewed. No pertinent surgical history.        Family History: Family history is unknown by patient. Otherwise pertinent FHx was reviewed and unremarkable.     Social History:        Medications:  Prior to Admission medications    Medication Sig Start Date End Date Taking? Authorizing Provider   acetaminophen (TYLENOL) 325 MG tablet Take 650 mg by mouth Every 6 (Six) Hours As Needed for Mild Pain .   Yes Brian Denton MD   albuterol (PROVENTIL) (2.5 MG/3ML) 0.083% nebulizer solution Take 2.5 mg by nebulization Every 8 (Eight) Hours As Needed for Wheezing.   Yes Brian Denton MD   ammonium lactate (LAC-HYDRIN) 12 % lotion Apply  topically to the appropriate area as directed Every 12 (Twelve) Hours.   Yes rBian Denton MD   benzonatate (TESSALON) 100 MG capsule Take 100 mg by mouth Every 8 (Eight) Hours As Needed for Cough.   Yes Brian Denton MD   bisacodyl (Biscolax) 10 MG suppository Insert 10 mg into the rectum 2 (Two) Times a Day As Needed for Constipation.   Yes Brian Denton MD   bismuth subsalicylate (PEPTO BISMOL) 262 MG chewable tablet Chew 524  mg Every 4 (Four) Hours As Needed for Diarrhea.   Yes Brian Denton MD   bumetanide (BUMEX) 1 MG tablet Take 1 mg by mouth Daily.   Yes Brian Denton MD   busPIRone (BUSPAR) 15 MG tablet Take 15 mg by mouth 3 (Three) Times a Day.   Yes Brian Denton MD   cetirizine (zyrTEC) 10 MG tablet Take 10 mg by mouth Daily.   Yes Brian Denton MD   Cholecalciferol (vitamin D3) 125 MCG (5000 UT) capsule capsule Take 5,000 Units by mouth Daily. 11/26/20 12/10/20 Yes Brian Denton MD   cloNIDine (CATAPRES) 0.1 MG tablet Take 0.1 mg by mouth 2 (Two) Times a Day.   Yes Brian Denton MD   cyclobenzaprine (FLEXERIL) 5 MG tablet Take 5 mg by mouth 3 (Three) Times a Day.   Yes Brian Denton MD   dextromethorphan polistirex ER (DELSYM) 30 MG/5ML Suspension Extended Release oral suspension Take 5 mL by mouth Every 6 (Six) Hours As Needed (cough).   Yes Brian Denton MD   dilTIAZem CD (CARDIZEM CD) 120 MG 24 hr capsule Take 120 mg by mouth 3 (Three) Times a Day.   Yes Brian Denton MD   docusate sodium (COLACE) 100 MG capsule Take 100 mg by mouth 3 (Three) Times a Day.   Yes Brian Denton MD   escitalopram (LEXAPRO) 5 MG tablet Take 5 mg by mouth Daily.   Yes Brian Denton MD   famotidine (PEPCID) 40 MG tablet Take 40 mg by mouth 2 (Two) Times a Day.   Yes Brian Denton MD   fluticasone (FLONASE) 50 MCG/ACT nasal spray 2 sprays into the nostril(s) as directed by provider Daily.   Yes Brian Denton MD   fluticasone (FLOVENT HFA) 110 MCG/ACT inhaler Inhale 1 puff Daily.   Yes Brian Denton MD   guaiFENesin (MUCINEX) 600 MG 12 hr tablet Take 600 mg by mouth 2 (Two) Times a Day.   Yes Brian Denton MD   HYDROcodone-acetaminophen (NORCO) 5-325 MG per tablet Take 1 tablet by mouth Every 6 (Six) Hours As Needed.   Yes Brian Denton MD   ipratropium-albuterol (DUO-NEB) 0.5-2.5 mg/3 ml nebulizer Take 3 mL by nebulization  Every 4 (Four) Hours As Needed for Wheezing.   Yes Brian Denton MD   lactulose (CHRONULAC) 10 GM/15ML solution Take 20 g by mouth Daily As Needed (constipation).   Yes Brian Denton MD   Lidocaine 4 % patch Apply 1 patch topically Daily.   Yes Brian Denton MD   losartan (COZAAR) 100 MG tablet Take 50 mg by mouth 2 (two) times a day.   Yes Brian Denton MD   magnesium hydroxide (MILK OF MAGNESIA) 400 MG/5ML suspension Take 30 mL by mouth Daily As Needed for Constipation.   Yes Brian Denton MD   Magnesium Sulfate, Laxative, (Epsom Salt) granules Apply 1 Tbsp transdermally every 1 hour as needed for Hemorrhoids, place in warm water for sits baths   Yes Brian Denton MD   melatonin 3 MG tablet Take 6 mg by mouth Every Night.   Yes Provider, Historical, MD   Menthol, Topical Analgesic, (Biofreeze) 4 % gel Apply  topically to the appropriate area as directed Every 12 (Twelve) Hours.   Yes Brian Denton MD   metoclopramide (REGLAN) 10 MG tablet Take 10 mg by mouth 3 (Three) Times a Day Before Meals.   Yes Brian Denton MD   mometasone-formoterol (DULERA 100) 100-5 MCG/ACT inhaler Inhale 2 puffs Every Morning.   Yes Brian Denton MD   montelukast (SINGULAIR) 10 MG tablet Take 10 mg by mouth Every Night.   Yes Provider, Historical, MD   nitroglycerin (NITROSTAT) 0.4 MG SL tablet Place 0.4 mg under the tongue Every 5 (Five) Minutes As Needed for Chest Pain. Take no more than 3 doses in 15 minutes.   Yes Brian Denton MD   ondansetron (ZOFRAN) 4 MG tablet Take 4 mg by mouth Every 8 (Eight) Hours As Needed for Nausea or Vomiting.   Yes Brian Denton MD   phenylephrine-shark liver oil-mineral oil-petrolatum (PREPARATION H) 0.25-3-14-71.9 % rectal ointment Insert  into the rectum 2 (Two) Times a Day As Needed for Hemorrhoids.   Yes Brian Denton MD   polyethylene glycol (MIRALAX) 17 g packet Take 17 g by mouth Daily As Needed  (constipation).   Yes Brian Denton MD   potassium chloride (K-DUR,KLOR-CON) 10 MEQ CR tablet Take 20 mEq by mouth Every Morning.   Yes Brian Denton MD   rivaroxaban (XARELTO) 20 MG tablet Take 20 mg by mouth Daily.   Yes Brian Denton MD   simethicone (MYLICON) 125 MG chewable tablet Chew 125 mg 3 (Three) Times a Day.   Yes Brian Denton MD   Sodium Phosphates (fleet enema) 7-19 GM/118ML enema Insert  into the rectum Daily As Needed (constipation).   Yes Brian Denton MD   tamsulosin (FLOMAX) 0.4 MG capsule 24 hr capsule Take 1 capsule by mouth Daily.   Yes Brian Denton MD   traZODone (DESYREL) 50 MG tablet Take 75 mg by mouth Every Night.   Yes Brian Denton MD   umeclidinium-vilanterol (Anoro Ellipta) 62.5-25 MCG/INH aerosol powder  inhaler Inhale 1 puff Daily.   Yes Brian Denton MD   vitamin C (ASCORBIC ACID) 500 MG tablet Take 1,000 mg by mouth 2 (two) times a day. 11/26/20 12/10/20 Yes Provider, Historical, MD   witch hazel-glycerin (TUCKS) pad Insert  into the rectum As Needed for Irritation.   Yes Brian Denton MD   zinc sulfate (Zinc-220) 220 (50 Zn) MG capsule Take 220 mg by mouth Daily. 11/26/20 12/10/20 Yes Brian Denton MD   bisacodyl (Dulcolax) 10 MG suppository Insert 10 mg into the rectum Daily As Needed for Constipation.  12/1/20 Yes Brian Denton MD   bumetanide (BUMEX) 2 MG tablet Take 2 mg by mouth Daily.    Brian Denton MD       Allergies:  No Known Allergies    Objective   Objective     Vital Signs  Temp:  [98.4 °F (36.9 °C)-99.2 °F (37.3 °C)] 98.9 °F (37.2 °C)  Heart Rate:  [68-98] 68  Resp:  [21-25] 24  BP: ()/(61-82) 94/71  SpO2:  [92 %-100 %] 100 %  on   ;   Device (Oxygen Therapy): NPPV/NIV  Body mass index is 32.08 kg/m².    Physical Exam  Vitals signs and nursing note reviewed.   Constitutional:       Appearance: Normal appearance. He is well-developed.   HENT:      Head:  Normocephalic and atraumatic.      Nose: No rhinorrhea.   Eyes:      Extraocular Movements: Extraocular movements intact.      Conjunctiva/sclera: Conjunctivae normal.      Pupils: Pupils are equal, round, and reactive to light.   Neck:      Musculoskeletal: Normal range of motion and neck supple.      Thyroid: No thyromegaly.      Vascular: No JVD.      Trachea: No tracheal deviation.   Cardiovascular:      Rate and Rhythm: Normal rate and regular rhythm.      Pulses: Normal pulses.      Heart sounds: Normal heart sounds.   Pulmonary:      Effort: Pulmonary effort is normal. No respiratory distress.      Breath sounds: Examination of the right-lower field reveals decreased breath sounds. Examination of the left-lower field reveals decreased breath sounds. Decreased breath sounds present. No wheezing, rhonchi or rales.   Abdominal:      General: Bowel sounds are normal. There is no distension.      Palpations: Abdomen is soft.      Tenderness: There is no abdominal tenderness. There is no guarding.   Musculoskeletal: Normal range of motion.   Skin:     General: Skin is warm and dry.   Neurological:      General: No focal deficit present.      Mental Status: He is alert and oriented to person, place, and time. Mental status is at baseline.      Motor: No abnormal muscle tone.   Psychiatric:         Behavior: Behavior normal.         Results Review:  I have personally reviewed most recent cardiac tracings, lab results and radiology images and interpretations and agree with findings    Results from last 7 days   Lab Units 12/01/20  1102 12/01/20  0453   WBC 10*3/mm3  --  6.10   HEMOGLOBIN g/dL  --  11.3*   HEMATOCRIT %  --  34.7*   PLATELETS 10*3/mm3  --  164   INR  1.05  --      Results from last 7 days   Lab Units 12/01/20  0453 12/01/20  0141 12/01/20  0026 12/01/20  0020   SODIUM mmol/L 138  --   --  141   POTASSIUM mmol/L 3.9  --   --  3.9   CHLORIDE mmol/L 101  --   --  100   CO2 mmol/L 26.0  --   --  27.0   BUN  mg/dL 30*  --   --  32*   CREATININE mg/dL 1.31*  --   --  1.49*   GLUCOSE mg/dL 129*  --   --  162*   CALCIUM mg/dL 8.2*  --   --  8.8   ALT (SGPT) U/L  --   --   --  30   AST (SGOT) U/L  --   --   --  40   TROPONIN T ng/mL <0.010 0.011  --   --    PROBNP pg/mL  --   --   --  783.5   LACTATE mmol/L  --   --  1.6  --    PROCALCITONIN ng/mL 0.39*  --   --   --      Estimated Creatinine Clearance: 67.2 mL/min (A) (by C-G formula based on SCr of 1.31 mg/dL (H)).  Brief Urine Lab Results     None          Microbiology Results (last 10 days)     Procedure Component Value - Date/Time    Respiratory Panel, PCR (WITHOUT COVID) - Swab, Nasopharynx [[id number]]  (Normal) Collected: 12/01/20 0503    Lab Status: Final result Specimen: Swab from Nasopharynx Updated: 12/01/20 0704     ADENOVIRUS, PCR Not Detected     Coronavirus 229E Not Detected     Coronavirus HKU1 Not Detected     Coronavirus NL63 Not Detected     Coronavirus OC43 Not Detected     Human Metapneumovirus Not Detected     Human Rhinovirus/Enterovirus Not Detected     Influenza B PCR Not Detected     Parainfluenza Virus 1 Not Detected     Parainfluenza Virus 2 Not Detected     Parainfluenza Virus 3 Not Detected     Parainfluenza Virus 4 Not Detected     Bordetella pertussis pcr Not Detected     Influenza A H1 2009 PCR Not Detected     Chlamydophila pneumoniae PCR Not Detected     Mycoplasma pneumo by PCR Not Detected     Influenza A PCR Not Detected     Influenza A H3 Not Detected     Influenza A H1 Not Detected     RSV, PCR Not Detected     Bordetella parapertussis PCR Not Detected    Narrative:      The coronavirus on the RVP is NOT COVID-19 and is NOT indicative of infection with COVID-19.           ECG/EMG Results (most recent)     Procedure Component Value Units Date/Time    ECG 12 Lead [[id number]] Collected: 12/01/20 0012     Updated: 12/01/20 0013     QT Interval 447 ms     Narrative:      HEART RATE= 91  bpm  RR Interval= 659  ms  WI Interval=   ms  P  Horizontal Axis=   deg  P Front Axis=   deg  QRSD Interval= 93  ms  QT Interval= 447  ms  QRS Axis= -36  deg  T Wave Axis= -23  deg  - ABNORMAL ECG -  Atrial fibrillation  Inferior infarct, age indeterminate  Anterior infarct, old  Prolonged QT interval  Electronically Signed By:   Date and Time of Study: 2020-12-01 00:12:20    ECG 12 Lead [[id number]]  (Abnormal) Resulted: 12/01/20 0256     Updated: 12/01/20 0256                    Xr Chest 1 View    Result Date: 12/1/2020  Diffuse bilateral infiltrates consistent with multifocal pneumonia. Electronically signed by:  Naveed Welch M.D.  12/1/2020 12:26 AM    Ct Chest Pulmonary Embolism    Result Date: 12/1/2020  1. Likely subacute pulmonary embolus within the distal main pulmonary artery on the left extending into the interlobar artery of the left lower lobe. 2. No evidence of thoracic aortic aneurysm or dissection. 3. Findings throughout the lungs likely related to a Covid pneumonia given the patient's clinical history. Follow-up to clearing recommended. Likely area of rounded atelectasis, left lower lobe. 4. Emphysema. These critical findings were discussed with JT Kimball Electronically signed by:  Costa Lou D.O.  12/1/2020 12:34 AM        Estimated Creatinine Clearance: 67.2 mL/min (A) (by C-G formula based on SCr of 1.31 mg/dL (H)).    Assessment/Plan   Assessment/Plan       Active Hospital Problems    Diagnosis  POA   • **Acute respiratory failure due to COVID-19 (CMS/HCC) [U07.1, J96.00]  Yes     Priority: High   • Pneumonia of both lungs due to infectious organism [J18.9]  Unknown   • Subacute pulmonary embolism (CMS/HCC) [I26.99]  Unknown   • Renal disease [N28.9]  Yes   • CAD (coronary artery disease) [I25.10]  Yes   • Hypertension [I10]  Yes   • Stroke (CMS/HCC) [I63.9]  Yes   • Mood disorder (CMS/HCC) [F39]  Yes   • GERD (gastroesophageal reflux disease) [K21.9]  Yes   • Afib (CMS/HCC) [I48.91]  Yes   • Insomnia [G47.00]  Yes   • BPH (benign  prostatic hyperplasia) [N40.0]  Yes      Resolved Hospital Problems   No resolved problems to display.       Acute respiratory failure due to Covid-19, Pneumonia due to Covid-19  -ABG: showed ph 7.39, CO2 43.2, O2 64, HCO3 26.5 on 100% non-rebreather  -currently on Bipap at 100%  -CT PE protocol: likely subacute pulmonary embolus within the distal main pulmonary artery on the left extending into the interlobar artery of the left lower lobe. Findings throughout the lungs likely related to Covid pneumonia. Emphysema  -, ferritin normal, procalcitonin 0.39, d-dimer 0.62, WBC normal, temp 99.2  -given IV rocephin, zithromax, and decadron in the ER  -Covid positive on 11/23/20, consult pharmacy for possible remdesivir   -continue albuterol, decadron, vitamins, start cefepime and doxycycline per attending MD  -consult pulmonary for further recommendations     Acute pulmonary embolus  -CT PE protocol as above  -on xarelto at home, given lovenox in ER, change to Heparin drip per attending MD    CKD  -creatinine 1.31  -monitor BMP    COPD  -on oxygen at night at baseline  -tx as above    Chronic atrial fibrillation  -cont home cardizem    Hypertension  -cont home cardizem, clonidine, losartan, bumex    Previous CVA  -on chronic anticoagulation, ?No statin listed on MAR    Depression  -cont home buspar, lexapro    Chronic pain  -on norco (Verified by Rx by [facility] MAR)    Chronic constipation  -prn laxatives     BPH  -cont home flomax      VTE Prophylaxis - switch to heparin drip      CODE STATUS:    Code Status and Medical Interventions:   Ordered at: 12/01/20 0308     Code Status:    CPR     Medical Interventions (Level of Support Prior to Arrest):    Full       This patient has been examined wearing appropriate Personal Protective Equipment 12/01/20      I discussed the patient's findings and my recommendations with patient and nursing staff.      Signature: Electronically signed by ALBERTO Lee, 12/01/20,  11:36 AM EST.    [facility] Hospitalist Team        Hospitalist Physician Assessment/Plan    History:  Patient somnolent now is able to answer some questions but he is 100% FiO2 on BiPAP saturating 100%.  PatientWas noted to be desaturating very easily with a mobile movement.  Patient was diagnosed with PE that does not seem to be large on CT scan report.  He had evidence of Covid pneumonia with bilateral infiltrates  His lung exam shows diminished air entry with bilateral rales  He had been seen and examined today and looks lethargic but able to move all 4 extremities    Exam:  As above  Medical Decision Making  Switch anticoagulation to heparin drip  Consult pulmonologist  Optimize medical therapy for Covid.  Discussed with pharmacist and pulmonologist.  High risk for intubation.  We will watch in PCU.  Low threshold for transfer to ICU if bed available  Cc time 40 min    Attending Physician Attestation    For this patient encounter, I have reviewed the mid-level provider documentation, medical decision making and treatment plan, and I have personally spent time with the patient.  All procedures were done by me, and/or all procedures were performed by the mid-level under my supervision.  Electronically signed by Mono Estes MD, 12/01/20, 4:11 PM EST.

## 2020-12-01 NOTE — CONSULTS
Group: [facility]         CONSULT NOTE    Patient Identification:  Gordon Dowling  65 y.o.  male  1955  [id number]            Requesting physician: Attending physician    Reason for Consultation: COVID-19      History of Present Illness:  65 y.o. male with PMH of COPD on oxygen nocturnally, HTN, CAD, A.fib on Xarelto, CKD, CVA, GERD, anemia, and insomnia [facility] with report per EMS of shortness of breath and hypoxia. He was reported to have O2 saturation 69% on room air. He was placed on 2L O2 via NC and had O2 saturation 74%. He was placed on non-rebreather by EMS. He reported some left sided chest pain, cough. Per ER he had no fever, nausea, vomiting, or diarrhea but reported constipation. The patient tested positive at his facility on 11/23/2020 per ER documentation. He normally wears O2 at night.      In the ER patient had CT PE protocol that showed likely subacute pulmonary embolus within the distal main pulmonary artery on the left extending into the interlobar artery of the left lower lobe. Findings throughout the lungs likely related to Covid pneumonia. Emphysema. ABG showed ph 7.39, CO2 43.2, O2 64, HCO3 26.5 on 100% non-rebreather. He was placed on Bipap. WBC normal, temp 99.2. Respiratory viral panel WITHOUT Covid was negative. Troponin negative x2. Blood cultures were drawn. He was given IV rocephin, zithromax, decadron, 324mg aspirin, started treatment lovenox. He was admitted to the PCU for further treatment.     Assessment:  Acute hypoxic respiratory failure secondary to COVID-19 infection  CT scan possible subacute distal PE, nonobstructing patient is on Xarelto 20 mg as an outpatient for A. Fib  Acute COPD exacerbation  A. fib  History of CVA  CKD    Recommendations:  Remdesivir  Decadron  Empiric antibiotics doxycycline and cefepime, monitor mental status especially with cefepime and patient already on trazodone and clonidine  Diuresis  currently on Bumex 1 mg daily, home medication  Anti-inflammatory agents will include vitamin C vitamin D zinc and Mucomyst p.o.  Continue anticoagulation with heparin drip may switch to Eliquis or Xarelto  Aspirin            COVID-19 LAB PANEL     COVID-19 test result  No results found for: COVID19    COVID-19 Prognostic lab results  WBC with differential  Results from last 7 days   Lab Units 12/01/20  0453 12/01/20  0020   WBC 10*3/mm3 6.10 6.60   PLATELETS 10*3/mm3 164 183   NEUTROPHIL % % 93.7* 91.6*   LYMPHOCYTE % % 2.8* 4.1*   NEUTROS ABS 10*3/mm3 5.70 6.00     Inflammatory markers  Results from last 7 days   Lab Units 12/01/20  0453 12/01/20  0141 12/01/20  0020   FERRITIN ng/mL 292.40  --   --    D DIMER QUANT mg/L (FEU) 0.62*  --   --    PROCALCITONIN ng/mL 0.39*  --   --    LDH U/L 425*  --   --    ALK PHOS U/L  --   --  98   AST (SGOT) U/L  --   --  40   ALT (SGPT) U/L  --   --  30   TROPONIN T ng/mL <0.010 0.011  --        Poor COVID-19 outcomes associated with:  Neutrophil:Lymphocyte ratio >3.5  Thrombocytopenia  LFT's >5x upper limit of normal  LDH >400  Review of Sytems:  Review of Systems   Respiratory: Positive for cough and shortness of breath.    Cardiovascular: Positive for palpitations and leg swelling.       Past Medical History:  Past Medical History:   Diagnosis Date   • Afib (CMS/HCC)    • Anemia    • BPH (benign prostatic hyperplasia)    • CAD (coronary artery disease)    • COPD (chronic obstructive pulmonary disease) (CMS/HCC)    • GERD (gastroesophageal reflux disease)    • Hypertension    • Insomnia    • Mood disorder (CMS/HCC)    • Renal disease    • Stroke (CMS/HCC)        Past Surgical History:  History reviewed. No pertinent surgical history.     Home Meds:  Medications Prior to Admission   Medication Sig Dispense Refill Last Dose   • acetaminophen (TYLENOL) 325 MG tablet Take 650 mg by mouth Every 6 (Six) Hours As Needed for Mild Pain .      • albuterol (PROVENTIL) (2.5 MG/3ML)  0.083% nebulizer solution Take 2.5 mg by nebulization Every 8 (Eight) Hours As Needed for Wheezing.      • ammonium lactate (LAC-HYDRIN) 12 % lotion Apply  topically to the appropriate area as directed Every 12 (Twelve) Hours.      • benzonatate (TESSALON) 100 MG capsule Take 100 mg by mouth Every 8 (Eight) Hours As Needed for Cough.      • bisacodyl (Biscolax) 10 MG suppository Insert 10 mg into the rectum 2 (Two) Times a Day As Needed for Constipation.      • bismuth subsalicylate (PEPTO BISMOL) 262 MG chewable tablet Chew 524 mg Every 4 (Four) Hours As Needed for Diarrhea.      • bumetanide (BUMEX) 1 MG tablet Take 1 mg by mouth Daily.      • busPIRone (BUSPAR) 15 MG tablet Take 15 mg by mouth 3 (Three) Times a Day.      • cetirizine (zyrTEC) 10 MG tablet Take 10 mg by mouth Daily.      • Cholecalciferol (vitamin D3) 125 MCG (5000 UT) capsule capsule Take 5,000 Units by mouth Daily.      • cloNIDine (CATAPRES) 0.1 MG tablet Take 0.1 mg by mouth 2 (Two) Times a Day.      • cyclobenzaprine (FLEXERIL) 5 MG tablet Take 5 mg by mouth 3 (Three) Times a Day.      • dextromethorphan polistirex ER (DELSYM) 30 MG/5ML Suspension Extended Release oral suspension Take 5 mL by mouth Every 6 (Six) Hours As Needed (cough).      • dilTIAZem CD (CARDIZEM CD) 120 MG 24 hr capsule Take 120 mg by mouth 3 (Three) Times a Day.      • docusate sodium (COLACE) 100 MG capsule Take 100 mg by mouth 3 (Three) Times a Day.      • escitalopram (LEXAPRO) 5 MG tablet Take 5 mg by mouth Daily.      • famotidine (PEPCID) 40 MG tablet Take 40 mg by mouth 2 (Two) Times a Day.      • fluticasone (FLONASE) 50 MCG/ACT nasal spray 2 sprays into the nostril(s) as directed by provider Daily.      • fluticasone (FLOVENT HFA) 110 MCG/ACT inhaler Inhale 1 puff Daily.      • guaiFENesin (MUCINEX) 600 MG 12 hr tablet Take 600 mg by mouth 2 (Two) Times a Day.      • HYDROcodone-acetaminophen (NORCO) 5-325 MG per tablet Take 1 tablet by mouth Every 6 (Six) Hours  As Needed.      • ipratropium-albuterol (DUO-NEB) 0.5-2.5 mg/3 ml nebulizer Take 3 mL by nebulization Every 4 (Four) Hours As Needed for Wheezing.      • lactulose (CHRONULAC) 10 GM/15ML solution Take 20 g by mouth Daily As Needed (constipation).      • Lidocaine 4 % patch Apply 1 patch topically Daily.      • losartan (COZAAR) 100 MG tablet Take 50 mg by mouth 2 (two) times a day.      • magnesium hydroxide (MILK OF MAGNESIA) 400 MG/5ML suspension Take 30 mL by mouth Daily As Needed for Constipation.      • Magnesium Sulfate, Laxative, (Epsom Salt) granules Apply 1 Tbsp transdermally every 1 hour as needed for Hemorrhoids, place in warm water for sits baths      • melatonin 3 MG tablet Take 6 mg by mouth Every Night.      • Menthol, Topical Analgesic, (Biofreeze) 4 % gel Apply  topically to the appropriate area as directed Every 12 (Twelve) Hours.      • metoclopramide (REGLAN) 10 MG tablet Take 10 mg by mouth 3 (Three) Times a Day Before Meals.      • mometasone-formoterol (DULERA 100) 100-5 MCG/ACT inhaler Inhale 2 puffs Every Morning.      • montelukast (SINGULAIR) 10 MG tablet Take 10 mg by mouth Every Night.      • nitroglycerin (NITROSTAT) 0.4 MG SL tablet Place 0.4 mg under the tongue Every 5 (Five) Minutes As Needed for Chest Pain. Take no more than 3 doses in 15 minutes.      • ondansetron (ZOFRAN) 4 MG tablet Take 4 mg by mouth Every 8 (Eight) Hours As Needed for Nausea or Vomiting.      • phenylephrine-shark liver oil-mineral oil-petrolatum (PREPARATION H) 0.25-3-14-71.9 % rectal ointment Insert  into the rectum 2 (Two) Times a Day As Needed for Hemorrhoids.      • polyethylene glycol (MIRALAX) 17 g packet Take 17 g by mouth Daily As Needed (constipation).      • potassium chloride (K-DUR,KLOR-CON) 10 MEQ CR tablet Take 20 mEq by mouth Every Morning.      • rivaroxaban (XARELTO) 20 MG tablet Take 20 mg by mouth Daily.      • simethicone (MYLICON) 125 MG chewable tablet Chew 125 mg 3 (Three) Times a Day.   "    • Sodium Phosphates (fleet enema) 7-19 GM/118ML enema Insert  into the rectum Daily As Needed (constipation).      • tamsulosin (FLOMAX) 0.4 MG capsule 24 hr capsule Take 1 capsule by mouth Daily.      • traZODone (DESYREL) 50 MG tablet Take 75 mg by mouth Every Night.      • umeclidinium-vilanterol (Anoro Ellipta) 62.5-25 MCG/INH aerosol powder  inhaler Inhale 1 puff Daily.      • vitamin C (ASCORBIC ACID) 500 MG tablet Take 1,000 mg by mouth 2 (two) times a day.      • witch hazel-glycerin (TUCKS) pad Insert  into the rectum As Needed for Irritation.      • zinc sulfate (Zinc-220) 220 (50 Zn) MG capsule Take 220 mg by mouth Daily.      • bumetanide (BUMEX) 2 MG tablet Take 2 mg by mouth Daily.          Allergies:  No Known Allergies    Social History:   Social History     Socioeconomic History   • Marital status: Unknown     Spouse name: Not on file   • Number of children: Not on file   • Years of education: Not on file   • Highest education level: Not on file   Tobacco Use   • Smoking status: Unknown If Ever Smoked   Social History Narrative    Lives at [facility]       Family History:  Family History   Family history unknown: Yes       Physical Exam:  BP 94/71   Pulse 68   Temp 98.9 °F (37.2 °C) (Axillary)   Resp 24   Ht 180.3 cm (71\")   Wt 104 kg (230 lb)   SpO2 100%   BMI 32.08 kg/m²  Body mass index is 32.08 kg/m². 100% 104 kg (230 lb)  Physical Exam  Cardiovascular:      Heart sounds: Murmur present.   Pulmonary:      Breath sounds: Wheezing, rhonchi and rales present.         LABS:  Lab Results   Component Value Date    CALCIUM 8.2 (L) 12/01/2020     Results from last 7 days   Lab Units 12/01/20  0453 12/01/20  0020   SODIUM mmol/L 138 141   POTASSIUM mmol/L 3.9 3.9   CHLORIDE mmol/L 101 100   CO2 mmol/L 26.0 27.0   BUN mg/dL 30* 32*   CREATININE mg/dL 1.31* 1.49*   GLUCOSE mg/dL 129* 162*   CALCIUM mg/dL 8.2* 8.8   WBC 10*3/mm3 6.10 6.60   HEMOGLOBIN g/dL 11.3* 12.8*   PLATELETS 10*3/mm3 164 " 183   ALT (SGPT) U/L  --  30   AST (SGOT) U/L  --  40   PROBNP pg/mL  --  783.5   PROCALCITONIN ng/mL 0.39*  --      Lab Results   Component Value Date    TROPONINT <0.010 12/01/2020     Results from last 7 days   Lab Units 12/01/20  0453 12/01/20  0141   TROPONIN T ng/mL <0.010 0.011         Results from last 7 days   Lab Units 12/01/20  0453 12/01/20  0026   PROCALCITONIN ng/mL 0.39*  --    LACTATE mmol/L  --  1.6     Results from last 7 days   Lab Units 12/01/20  0027   PH, ARTERIAL pH units 7.396   PCO2, ARTERIAL mm Hg 43.2   PO2 ART mm Hg 64.2*   O2 SATURATION ART % 91.9*   MODALITY  NRB     Results from last 7 days   Lab Units 12/01/20  0507   ADENOVIRUS DETECTION BY PCR  Not Detected   CORONAVIRUS 229E  Not Detected   CORONAVIRUS HKU1  Not Detected   CORONAVIRUS NL63  Not Detected   CORONAVIRUS OC43  Not Detected   HUMAN METAPNEUMOVIRUS  Not Detected   HUMAN RHINOVIRUS/ENTEROVIRUS  Not Detected   INFLUENZA B PCR  Not Detected   PARAINFLUENZA 1  Not Detected   PARAINFLUENZA VIRUS 2  Not Detected   PARAINFLUENZA VIRUS 3  Not Detected   PARAINFLUENZA VIRUS 4  Not Detected   BORDETELLA PERTUSSIS PCR  Not Detected   CHLAMYDOPHILA PNEUMONIAE PCR  Not Detected   MYCOPLAMA PNEUMO PCR  Not Detected   INFLUENZA A PCR  Not Detected   INFLUENZA A H3  Not Detected   INFLUENZA A H1  Not Detected   RSV, PCR  Not Detected     Results from last 7 days   Lab Units 12/01/20  1102 12/01/20  0020   INR  1.05 1.17*         No results found for: TSH  Estimated Creatinine Clearance: 67.2 mL/min (A) (by C-G formula based on SCr of 1.31 mg/dL (H)).         Imaging:  Imaging Results (Last 24 Hours)     Procedure Component Value Units Date/Time    CT Chest Pulmonary Embolism [[id number]] Collected: 12/01/20 0025     Updated: 12/01/20 0235    Narrative:      Exam: CT Angiography of the Chest.    Date: 12/1/2020.     Comparison: None    History: Shortness of breath with Covid positive test. Elevated d-dimer.    Technique: CT examination of  the chest was performed following the intravenous administration of 100 mm of Isovue-370. CT dose lowering techniques were used, to include: automated exposure control, adjustment for patient size, and/or use of iterative   reconstruction.    FINDINGS:    Mediastinum and Nata: There are several slightly enlarged right hilar lymph nodes which could be reactive.    Pleural and Pericardial spaces: There are no pleural or pericardial effusions.    Upper Abdomen: There is a 4 cm cyst is partially visualized in the upper pole the right kidney. The visualized upper abdomen otherwise appears unremarkable.    Cardiovascular: There are midline sternotomy wires. There are severe coronary artery calcifications.    Pulmonary Artery: There is peripheral thrombus seen within the distal main pulmonary artery extending into the interlobar artery of the right lower lobe which is likely subacute.    Lung Parenchyma and Airways: Scattered groundglass areas of opacity are seen throughout the lungs bilaterally which are likely related to a Covid pneumonia given the patient's clinical history. A more focal area of opacity seen in the left lower lobe.   Follow-up to clearing is recommended. There is severe upper lobe predominant centrilobular and paraseptal emphysema.    Bones: No fracture or aggressive osseous lesion.      Impression:        1. Likely subacute pulmonary embolus within the distal main pulmonary artery on the left extending into the interlobar artery of the left lower lobe.  2. No evidence of thoracic aortic aneurysm or dissection.  3. Findings throughout the lungs likely related to a Covid pneumonia given the patient's clinical history. Follow-up to clearing recommended. Likely area of rounded atelectasis, left lower lobe.  4. Emphysema.    These critical findings were discussed with JT Kimball      Electronically signed by:  Costa Lou D.O.    12/1/2020 12:34 AM    XR Chest 1 View [[id number]] Collected: 12/01/20  0025     Updated: 12/01/20 0227    Narrative:      Exam: Frontal chest    INDICATION: Shortness of breath    COMPARISON: None    FINDINGS:  There are diffuse bilateral interstitial infiltrates.  There are no effusions.    Heart size is normal.  No mediastinal widening.    Sternal wires are intact.      Impression:      Diffuse bilateral infiltrates consistent with multifocal pneumonia.    Electronically signed by:  Naveed Welch M.D.    12/1/2020 12:26 AM            Current Meds:   SCHEDULE  albuterol sulfate HFA, 2 puff, Inhalation, 4x Daily - RT  ammonium lactate, , Topical, Q12H  budesonide-formoterol, 2 puff, Inhalation, BID - RT  bumetanide, 1 mg, Oral, Daily  busPIRone, 15 mg, Oral, TID  cefepime, 2 g, Intravenous, Once  cefepime, 2 g, Intravenous, Q8H  cetirizine, 10 mg, Oral, Nightly  cloNIDine, 0.1 mg, Oral, Q12H  cyclobenzaprine, 5 mg, Oral, TID  [START ON 12/2/2020] dexamethasone, 6 mg, Intravenous, Daily  dilTIAZem CD, 120 mg, Oral, Daily  docusate sodium, 100 mg, Oral, TID  doxycycline, 100 mg, Oral, Q12H  escitalopram, 5 mg, Oral, Daily  guaiFENesin, 600 mg, Oral, Q12H  lidocaine, 1 patch, Transdermal, Q24H  losartan, 50 mg, Oral, BID  metoclopramide, 10 mg, Oral, TID AC  montelukast, 10 mg, Oral, Nightly  [START ON 12/2/2020] pantoprazole, 40 mg, Oral, Q AM  potassium chloride, 20 mEq, Oral, Daily With Lunch  remdesivir, 200 mg, Intravenous, Q24H    Followed by  [START ON 12/2/2020] remdesivir, 100 mg, Intravenous, Q24H  simethicone, 120 mg, Oral, TID  sodium chloride, 10 mL, Intravenous, Q12H  tamsulosin, 0.4 mg, Oral, Daily  traZODone, 75 mg, Oral, Nightly  vitamin C, 500 mg, Oral, TID  vitamin D3, 5,000 Units, Oral, Daily  zinc sulfate, 220 mg, Oral, Daily      Infusions  heparin, 14.4 Units/kg/hr  Pharmacy Consult - Pharmacy to dose,       PRNs  •  acetaminophen **OR** acetaminophen **OR** acetaminophen  •  acetaminophen  •  benzonatate  •  bismuth subsalicylate  •  fleet enema  •  heparin  •   heparin  •  HYDROcodone-acetaminophen  •  lactulose  •  magnesium sulfate **OR** magnesium sulfate in D5W 1g/100mL (PREMIX)  •  melatonin  •  nitroglycerin  •  ondansetron **OR** ondansetron  •  Pharmacy Consult - Pharmacy to dose  •  polyethylene glycol  •  potassium chloride **OR** potassium chloride **OR** potassium chloride  •  sodium chloride  •  sodium chloride  •  sodium chloride  •  witch hazel-glycerin        Kae Mishra MD  12/1/2020  11:49 EST      Much of this encounter note is an electronic transcription/translation of spoken language to printed text using Dragon Software.

## 2020-12-01 NOTE — PROGRESS NOTES
Discharge Planning Assessment   [facility]     Patient Name: Gordon Dowling  MRN: [id number]  Today's Date: 12/1/2020    Admit Date: 12/1/2020    Discharge Needs Assessment     Row Name 12/01/20 1603       Living Environment    Lives With  facility resident    Current Living Arrangements  extended care facility    Provides Primary Care For  no one, unable/limited ability to care for self    Quality of Family Relationships  unable to assess    Able to Return to Prior Arrangements  yes       Transition Planning    Patient/Family Anticipates Transition to  long-term care facility    Transportation Anticipated  health plan transportation       Discharge Needs Assessment    Readmission Within the Last 30 Days  no previous admission in last 30 days    Concerns to be Addressed  discharge planning;care coordination/care conferences    Anticipated Changes Related to Illness  none    Equipment Needed After Discharge  none    Provided Post Acute Provider List?  N/A        Discharge Plan     Row Name 12/01/20 1603       Plan    Patient/Family in Agreement with Plan  unable to assess    Plan Comments  [facility] liason confirmed that patient is from [facility] and can return at [place]. CM attempted to verify return with patient’s emergency contact however, emergency contact listed is “ Yesica” with phone number [phone]. CM contacted phone number. No answer and voicemail box available was for [facility]. Voicemail was left at phone number with CM name and callback information. MSW asked [facility] if they have an emergency contact listed for patient but they also had that phone number. DC Barriers: COVID positive, bipap, IV antibiotics, heparin gtt. Will attempt at later time to case manage/confirm wih pt/family for return to [facility].       Plan  DC Plan: [place] [facility]. Okay to return per facility liaison, needs confirmed with pt/family. No PASRR or pre-cert needed for return.     Continued Care  and Services - Admitted Since 12/1/2020     Destination     Service Provider Request Status Selected Services Address Phone Fax Patient Preferred    [facility] - [facility]  Pending - Request Sent N/A [address] [place] [address] [phone] --                Demographic Summary     Row Name 12/01/20 1606       General Information    Admission Type  observation    Reason for Consult  discharge planning        No physical contact with patient.    Annmarie Johnson RN       Office Phone: [phone]  Office Cell: [phone]

## 2020-12-01 NOTE — DISCHARGE PLACEMENT REQUEST
"Gordon Dowling (65 y.o. Male)     Date of Birth Social Security Number Address Home Phone MRN    1955  [address] [place] [address] [phone] [id number]    [employer] Marital Status          Unknown Unknown       Admission Date Admission Type Admitting Provider Attending Provider Department, Room/Bed    12/1/20 Emergency Mono Estes MD Gad, George Fayez Labib Youssief, MD [facility], 2125/1    Discharge Date Discharge Disposition Discharge Destination                       Attending Provider: Mono Estes MD    Allergies: No Known Allergies    Isolation: Enh Drop/Con   Infection: COVID (confirmed) (12/01/20)   Code Status: CPR    Ht: 180.3 cm (71\")   Wt: 104 kg (230 lb)    Admission Cmt: None   Principal Problem: None                Active Insurance as of 12/1/2020     Primary Coverage     Payor Plan Insurance Group Employer/Plan Group    [place]      Payor Plan Address Payor Plan Phone Number Payor Plan Fax Number Effective Dates    [address]   11/1/2020 - None Entered    [place] [address]       Subscriber Name Subscriber Birth Date Member ID       GORDON DOWLING 1955 [id number]                 Emergency Contacts      (Rel.) Home Phone Work Phone Mobile Phone    ,ADEEL (Other) [phone] -- [phone]              "

## 2020-12-01 NOTE — PLAN OF CARE
Problem: Adult Inpatient Plan of Care  Goal: Plan of Care Review  Flowsheets (Taken 12/1/2020 1635)  Progress: improving  Plan of Care Reviewed With: patient  Outcome Summary: patient on PF 25 L 100%. more alert. will continue to monitor  Goal: Absence of Hospital-Acquired Illness or Injury  Intervention: Identify and Manage Fall Risk  Recent Flowsheet Documentation  Taken 12/1/2020 1400 by Veronica Duque RN  Safety Promotion/Fall Prevention: safety round/check completed  Taken 12/1/2020 1200 by Veronica Duque RN  Safety Promotion/Fall Prevention: safety round/check completed  Taken 12/1/2020 1000 by Veronica Duque RN  Safety Promotion/Fall Prevention: safety round/check completed  Taken 12/1/2020 0800 by Veronica Duque RN  Safety Promotion/Fall Prevention: safety round/check completed  Intervention: Prevent and Manage VTE (venous thromboembolism) Risk  Recent Flowsheet Documentation  Taken 12/1/2020 0800 by Veronica Duque RN  VTE Prevention/Management: (lovenox ) bleeding risk factor(s) identified  Intervention: Prevent Infection  Recent Flowsheet Documentation  Taken 12/1/2020 0800 by Veronica Duque RN  Infection Prevention:   personal protective equipment utilized   rest/sleep promoted   single patient room provided   visitors restricted/screened   hand hygiene promoted   Goal Outcome Evaluation:

## 2020-12-01 NOTE — ED NOTES
Pt presents with SOA associated with COVID+. Pt resides at [facility] and tested + on Monday. Per report from facility, pt O2 sat was 69% on room air this evening. After placing pt on 4L, pt O2 went up to 77%. EMS reports in route, they removed O2 and pt dropped to 80%. Pt reports hx of COPD.      Lisset Veras, RN  12/01/20 0035

## 2020-12-02 LAB
ALBUMIN SERPL-MCNC: 3.3 G/DL (ref 3.5–5.2)
ALBUMIN/GLOB SERPL: 1 G/DL
ALP SERPL-CCNC: 142 U/L (ref 39–117)
ALT SERPL W P-5'-P-CCNC: 23 U/L (ref 1–41)
ANION GAP SERPL CALCULATED.3IONS-SCNC: 11 MMOL/L (ref 5–15)
APTT PPP: 54.4 SECONDS (ref 61–76.5)
APTT PPP: >139 SECONDS (ref 61–76.5)
APTT PPP: >139 SECONDS (ref 61–76.5)
AST SERPL-CCNC: 28 U/L (ref 1–40)
B PARAPERT DNA SPEC QL NAA+PROBE: NOT DETECTED
B PERT DNA SPEC QL NAA+PROBE: NOT DETECTED
BACTERIA SPEC AEROBE CULT: ABNORMAL
BASOPHILS # BLD AUTO: 0 10*3/MM3 (ref 0–0.2)
BASOPHILS NFR BLD AUTO: 0.4 % (ref 0–1.5)
BILIRUB SERPL-MCNC: 0.5 MG/DL (ref 0–1.2)
BUN SERPL-MCNC: 40 MG/DL (ref 8–23)
BUN/CREAT SERPL: 35.7 (ref 7–25)
C PNEUM DNA NPH QL NAA+NON-PROBE: NOT DETECTED
CALCIUM SPEC-SCNC: 8.8 MG/DL (ref 8.6–10.5)
CHLORIDE SERPL-SCNC: 107 MMOL/L (ref 98–107)
CK SERPL-CCNC: 87 U/L (ref 20–200)
CO2 SERPL-SCNC: 26 MMOL/L (ref 22–29)
CREAT SERPL-MCNC: 1.12 MG/DL (ref 0.76–1.27)
CRP SERPL-MCNC: 13.3 MG/DL (ref 0–0.5)
D DIMER PPP FEU-MCNC: 0.89 MG/L (FEU) (ref 0–0.59)
DEPRECATED RDW RBC AUTO: 51.2 FL (ref 37–54)
EOSINOPHIL # BLD AUTO: 0 10*3/MM3 (ref 0–0.4)
EOSINOPHIL NFR BLD AUTO: 0 % (ref 0.3–6.2)
ERYTHROCYTE [DISTWIDTH] IN BLOOD BY AUTOMATED COUNT: 15.2 % (ref 12.3–15.4)
FERRITIN SERPL-MCNC: 362.1 NG/ML (ref 30–400)
FIBRINOGEN PPP-MCNC: 576 MG/DL (ref 210–450)
FLUAV SUBTYP SPEC NAA+PROBE: NOT DETECTED
FLUBV RNA ISLT QL NAA+PROBE: NOT DETECTED
GFR SERPL CREATININE-BSD FRML MDRD: 65 ML/MIN/1.73
GLOBULIN UR ELPH-MCNC: 3.2 GM/DL
GLUCOSE SERPL-MCNC: 135 MG/DL (ref 65–99)
GRAM STN SPEC: ABNORMAL
HADV DNA SPEC NAA+PROBE: NOT DETECTED
HCOV 229E RNA SPEC QL NAA+PROBE: NOT DETECTED
HCOV HKU1 RNA SPEC QL NAA+PROBE: NOT DETECTED
HCOV NL63 RNA SPEC QL NAA+PROBE: NOT DETECTED
HCOV OC43 RNA SPEC QL NAA+PROBE: NOT DETECTED
HCT VFR BLD AUTO: 37.5 % (ref 37.5–51)
HGB BLD-MCNC: 12 G/DL (ref 13–17.7)
HMPV RNA NPH QL NAA+NON-PROBE: NOT DETECTED
HPIV1 RNA SPEC QL NAA+PROBE: NOT DETECTED
HPIV2 RNA SPEC QL NAA+PROBE: NOT DETECTED
HPIV3 RNA NPH QL NAA+PROBE: NOT DETECTED
HPIV4 P GENE NPH QL NAA+PROBE: NOT DETECTED
ISOLATED FROM: ABNORMAL
LDH SERPL-CCNC: 430 U/L (ref 135–225)
LYMPHOCYTES # BLD AUTO: 0.3 10*3/MM3 (ref 0.7–3.1)
LYMPHOCYTES NFR BLD AUTO: 4.9 % (ref 19.6–45.3)
M PNEUMO IGG SER IA-ACNC: NOT DETECTED
MAGNESIUM SERPL-MCNC: 2 MG/DL (ref 1.6–2.4)
MCH RBC QN AUTO: 30.4 PG (ref 26.6–33)
MCHC RBC AUTO-ENTMCNC: 32.1 G/DL (ref 31.5–35.7)
MCV RBC AUTO: 94.9 FL (ref 79–97)
MONOCYTES # BLD AUTO: 0.3 10*3/MM3 (ref 0.1–0.9)
MONOCYTES NFR BLD AUTO: 5.6 % (ref 5–12)
NEUTROPHILS NFR BLD AUTO: 5.2 10*3/MM3 (ref 1.7–7)
NEUTROPHILS NFR BLD AUTO: 89.1 % (ref 42.7–76)
NRBC BLD AUTO-RTO: 0 /100 WBC (ref 0–0.2)
PLATELET # BLD AUTO: 197 10*3/MM3 (ref 140–450)
PMV BLD AUTO: 8.2 FL (ref 6–12)
POTASSIUM SERPL-SCNC: 4.3 MMOL/L (ref 3.5–5.2)
PROT SERPL-MCNC: 6.5 G/DL (ref 6–8.5)
RBC # BLD AUTO: 3.95 10*6/MM3 (ref 4.14–5.8)
RHINOVIRUS RNA SPEC NAA+PROBE: NOT DETECTED
RSV RNA NPH QL NAA+NON-PROBE: NOT DETECTED
SARS-COV-2 RNA NPH QL NAA+NON-PROBE: DETECTED
SODIUM SERPL-SCNC: 144 MMOL/L (ref 136–145)
WBC # BLD AUTO: 5.8 10*3/MM3 (ref 3.4–10.8)

## 2020-12-02 PROCEDURE — 83615 LACTATE (LD) (LDH) ENZYME: CPT | Performed by: STUDENT IN AN ORGANIZED HEALTH CARE EDUCATION/TRAINING PROGRAM

## 2020-12-02 PROCEDURE — 85730 THROMBOPLASTIN TIME PARTIAL: CPT | Performed by: INTERNAL MEDICINE

## 2020-12-02 PROCEDURE — 85025 COMPLETE CBC W/AUTO DIFF WBC: CPT | Performed by: STUDENT IN AN ORGANIZED HEALTH CARE EDUCATION/TRAINING PROGRAM

## 2020-12-02 PROCEDURE — 94660 CPAP INITIATION&MGMT: CPT

## 2020-12-02 PROCEDURE — 86140 C-REACTIVE PROTEIN: CPT | Performed by: STUDENT IN AN ORGANIZED HEALTH CARE EDUCATION/TRAINING PROGRAM

## 2020-12-02 PROCEDURE — 94799 UNLISTED PULMONARY SVC/PX: CPT

## 2020-12-02 PROCEDURE — 83735 ASSAY OF MAGNESIUM: CPT | Performed by: STUDENT IN AN ORGANIZED HEALTH CARE EDUCATION/TRAINING PROGRAM

## 2020-12-02 PROCEDURE — 25010000002 CEFEPIME PER 500 MG: Performed by: NURSE PRACTITIONER

## 2020-12-02 PROCEDURE — 0202U NFCT DS 22 TRGT SARS-COV-2: CPT | Performed by: NURSE PRACTITIONER

## 2020-12-02 PROCEDURE — 85379 FIBRIN DEGRADATION QUANT: CPT | Performed by: STUDENT IN AN ORGANIZED HEALTH CARE EDUCATION/TRAINING PROGRAM

## 2020-12-02 PROCEDURE — 82550 ASSAY OF CK (CPK): CPT | Performed by: STUDENT IN AN ORGANIZED HEALTH CARE EDUCATION/TRAINING PROGRAM

## 2020-12-02 PROCEDURE — 99233 SBSQ HOSP IP/OBS HIGH 50: CPT | Performed by: INTERNAL MEDICINE

## 2020-12-02 PROCEDURE — 25010000002 DEXAMETHASONE SODIUM PHOSPHATE 20 MG/5ML SOLUTION 5 ML VIAL: Performed by: INTERNAL MEDICINE

## 2020-12-02 PROCEDURE — 85384 FIBRINOGEN ACTIVITY: CPT | Performed by: STUDENT IN AN ORGANIZED HEALTH CARE EDUCATION/TRAINING PROGRAM

## 2020-12-02 PROCEDURE — 82728 ASSAY OF FERRITIN: CPT | Performed by: STUDENT IN AN ORGANIZED HEALTH CARE EDUCATION/TRAINING PROGRAM

## 2020-12-02 PROCEDURE — 80053 COMPREHEN METABOLIC PANEL: CPT | Performed by: STUDENT IN AN ORGANIZED HEALTH CARE EDUCATION/TRAINING PROGRAM

## 2020-12-02 PROCEDURE — 25010000002 HEPARIN (PORCINE) 25000-0.45 UT/250ML-% SOLUTION: Performed by: NURSE PRACTITIONER

## 2020-12-02 RX ADMIN — DILTIAZEM HYDROCHLORIDE 120 MG: 120 CAPSULE, COATED, EXTENDED RELEASE ORAL at 08:37

## 2020-12-02 RX ADMIN — CEFEPIME HYDROCHLORIDE 2 G: 2 INJECTION, POWDER, FOR SOLUTION INTRAVENOUS at 13:28

## 2020-12-02 RX ADMIN — CYCLOBENZAPRINE HYDROCHLORIDE 5 MG: 10 TABLET, FILM COATED ORAL at 21:15

## 2020-12-02 RX ADMIN — HYDROCODONE BITARTRATE AND ACETAMINOPHEN 1 TABLET: 5; 325 TABLET ORAL at 23:09

## 2020-12-02 RX ADMIN — ZINC SULFATE 220 MG (50 MG) CAPSULE 220 MG: CAPSULE at 08:38

## 2020-12-02 RX ADMIN — Medication 10 MG: at 21:17

## 2020-12-02 RX ADMIN — CLONIDINE HYDROCHLORIDE 0.05 MG: 0.1 TABLET ORAL at 21:16

## 2020-12-02 RX ADMIN — Medication 600 MG: at 08:37

## 2020-12-02 RX ADMIN — DOXYCYCLINE 100 MG: 100 TABLET, FILM COATED ORAL at 21:16

## 2020-12-02 RX ADMIN — CYCLOBENZAPRINE HYDROCHLORIDE 5 MG: 10 TABLET, FILM COATED ORAL at 08:37

## 2020-12-02 RX ADMIN — BUSPIRONE HYDROCHLORIDE 15 MG: 10 TABLET ORAL at 08:38

## 2020-12-02 RX ADMIN — GUAIFENESIN 600 MG: 600 TABLET, EXTENDED RELEASE ORAL at 21:17

## 2020-12-02 RX ADMIN — DOCUSATE SODIUM 100 MG: 100 CAPSULE, LIQUID FILLED ORAL at 08:37

## 2020-12-02 RX ADMIN — TRAZODONE HYDROCHLORIDE 75 MG: 50 TABLET ORAL at 21:15

## 2020-12-02 RX ADMIN — TAMSULOSIN HYDROCHLORIDE 0.4 MG: 0.4 CAPSULE ORAL at 08:37

## 2020-12-02 RX ADMIN — ALBUTEROL SULFATE 2 PUFF: 108 AEROSOL, METERED RESPIRATORY (INHALATION) at 07:02

## 2020-12-02 RX ADMIN — ZINC SULFATE 220 MG (50 MG) CAPSULE 220 MG: CAPSULE at 21:16

## 2020-12-02 RX ADMIN — ALBUTEROL SULFATE 2 PUFF: 108 AEROSOL, METERED RESPIRATORY (INHALATION) at 16:21

## 2020-12-02 RX ADMIN — DOXYCYCLINE 100 MG: 100 TABLET, FILM COATED ORAL at 08:38

## 2020-12-02 RX ADMIN — ESCITALOPRAM OXALATE 5 MG: 10 TABLET ORAL at 08:37

## 2020-12-02 RX ADMIN — SIMETHICONE 120 MG: 80 TABLET, CHEWABLE ORAL at 21:14

## 2020-12-02 RX ADMIN — ALBUTEROL SULFATE 2 PUFF: 108 AEROSOL, METERED RESPIRATORY (INHALATION) at 12:30

## 2020-12-02 RX ADMIN — CETIRIZINE HYDROCHLORIDE 10 MG: 10 TABLET, FILM COATED ORAL at 21:17

## 2020-12-02 RX ADMIN — BUSPIRONE HYDROCHLORIDE 15 MG: 10 TABLET ORAL at 21:15

## 2020-12-02 RX ADMIN — GUAIFENESIN 600 MG: 600 TABLET, EXTENDED RELEASE ORAL at 08:38

## 2020-12-02 RX ADMIN — SODIUM CHLORIDE 100 MG: 9 INJECTION, SOLUTION INTRAVENOUS at 13:28

## 2020-12-02 RX ADMIN — OXYCODONE HYDROCHLORIDE AND ACETAMINOPHEN 1000 MG: 500 TABLET ORAL at 08:37

## 2020-12-02 RX ADMIN — Medication 5000 UNITS: at 08:38

## 2020-12-02 RX ADMIN — HYDROCODONE BITARTRATE AND ACETAMINOPHEN 1 TABLET: 5; 325 TABLET ORAL at 16:56

## 2020-12-02 RX ADMIN — BUSPIRONE HYDROCHLORIDE 15 MG: 10 TABLET ORAL at 16:56

## 2020-12-02 RX ADMIN — METOCLOPRAMIDE 10 MG: 10 TABLET ORAL at 13:30

## 2020-12-02 RX ADMIN — MONTELUKAST 10 MG: 10 TABLET, FILM COATED ORAL at 21:17

## 2020-12-02 RX ADMIN — Medication 1 APPLICATION: at 21:20

## 2020-12-02 RX ADMIN — DOCUSATE SODIUM 100 MG: 100 CAPSULE, LIQUID FILLED ORAL at 16:56

## 2020-12-02 RX ADMIN — BUMETANIDE 1 MG: 1 TABLET ORAL at 08:37

## 2020-12-02 RX ADMIN — METOCLOPRAMIDE 10 MG: 10 TABLET ORAL at 08:37

## 2020-12-02 RX ADMIN — DOCUSATE SODIUM 100 MG: 100 CAPSULE, LIQUID FILLED ORAL at 21:17

## 2020-12-02 RX ADMIN — SIMETHICONE 120 MG: 80 TABLET, CHEWABLE ORAL at 16:56

## 2020-12-02 RX ADMIN — SIMETHICONE 120 MG: 80 TABLET, CHEWABLE ORAL at 08:37

## 2020-12-02 RX ADMIN — METOCLOPRAMIDE 10 MG: 10 TABLET ORAL at 16:56

## 2020-12-02 RX ADMIN — BUDESONIDE AND FORMOTEROL FUMARATE DIHYDRATE 2 PUFF: 160; 4.5 AEROSOL RESPIRATORY (INHALATION) at 07:01

## 2020-12-02 RX ADMIN — ALBUTEROL SULFATE 2 PUFF: 108 AEROSOL, METERED RESPIRATORY (INHALATION) at 21:05

## 2020-12-02 RX ADMIN — HYDROCODONE BITARTRATE AND ACETAMINOPHEN 1 TABLET: 5; 325 TABLET ORAL at 04:26

## 2020-12-02 RX ADMIN — DEXAMETHASONE SODIUM PHOSPHATE 12 MG: 4 INJECTION, SOLUTION INTRAMUSCULAR; INTRAVENOUS at 08:37

## 2020-12-02 RX ADMIN — Medication 600 MG: at 21:16

## 2020-12-02 RX ADMIN — Medication 10 ML: at 08:38

## 2020-12-02 RX ADMIN — HEPARIN SODIUM 12.4 UNITS/KG/HR: 10000 INJECTION, SOLUTION INTRAVENOUS at 18:12

## 2020-12-02 RX ADMIN — ATORVASTATIN CALCIUM 40 MG: 40 TABLET, FILM COATED ORAL at 08:39

## 2020-12-02 RX ADMIN — POTASSIUM CHLORIDE 20 MEQ: 1500 TABLET, EXTENDED RELEASE ORAL at 13:28

## 2020-12-02 RX ADMIN — CEFEPIME HYDROCHLORIDE 2 G: 2 INJECTION, POWDER, FOR SOLUTION INTRAVENOUS at 04:26

## 2020-12-02 RX ADMIN — BUDESONIDE AND FORMOTEROL FUMARATE DIHYDRATE 2 PUFF: 160; 4.5 AEROSOL RESPIRATORY (INHALATION) at 21:04

## 2020-12-02 RX ADMIN — PANTOPRAZOLE SODIUM 40 MG: 40 TABLET, DELAYED RELEASE ORAL at 05:55

## 2020-12-02 RX ADMIN — Medication 200 MG: at 08:38

## 2020-12-02 RX ADMIN — CLONIDINE HYDROCHLORIDE 0.05 MG: 0.1 TABLET ORAL at 08:38

## 2020-12-02 RX ADMIN — Medication 10 ML: at 21:13

## 2020-12-02 RX ADMIN — CEFEPIME HYDROCHLORIDE 2 G: 2 INJECTION, POWDER, FOR SOLUTION INTRAVENOUS at 21:13

## 2020-12-02 RX ADMIN — CYCLOBENZAPRINE HYDROCHLORIDE 5 MG: 10 TABLET, FILM COATED ORAL at 16:56

## 2020-12-02 NOTE — PROGRESS NOTES
[facility] Services Daily Progress Note      Hospitalist Team  LOS 0 days      Patient Care Team:  Ba Loaiza MD as PCP - General (Geriatric Medicine)    Patient Location: 2125/1      Subjective   Subjective     Chief Complaint / Subjective  Chief Complaint   Patient presents with   • Shortness of Breath         Brief Synopsis of Hospital Course/HPI  HPI limited due to patient on Bipap mask, mostly taken from medical record review and ER documentation     Mr. Dowling is a 65 y.o. male with PMH of COPD on oxygen nocturnally, HTN, CAD, A.fib on Xarelto, CKD, CVA, GERD, anemia, and insomnia who presented to [facility] ER 12/1/20 from [facility] with report per EMS of shortness of breath and hypoxia. He was reported to have O2 saturation 69% on room air. He was placed on 2L O2 via NC and had O2 saturation 74%. He was placed on non-rebreather by EMS. He reported some left sided chest pain, cough. Per ER he had no fever, nausea, vomiting, or diarrhea but reported constipation. The patient tested positive at his facility on 11/23/2020 per ER documentation. He normally wears O2 at night.      In the ER patient had CT PE protocol that showed likely subacute pulmonary embolus within the distal main pulmonary artery on the left extending into the interlobar artery of the left lower lobe. Findings throughout the lungs likely related to Covid pneumonia. Emphysema. ABG showed ph 7.39, CO2 43.2, O2 64, HCO3 26.5 on 100% non-rebreather. He was placed on Bipap. WBC normal, temp 99.2. Respiratory viral panel WITHOUT Covid was negative. Troponin negative x2. Blood cultures were drawn. He was given IV rocephin, zithromax, decadron, 324mg aspirin, started treatment lovenox. He was admitted to the PCU for further treatment.                Date::      12/2/2020  Patient had been high flow oxygen.  Short of breath      ROS  All other systems reviewed and negative    Objective   Objective      Vital  "Signs  Temp:  [97.1 °F (36.2 °C)-99.1 °F (37.3 °C)] 97.1 °F (36.2 °C)  Heart Rate:  [] 91  Resp:  [16-26] 22  BP: ()/(71-82) 132/80  Oxygen Therapy  SpO2: 95 %  Pulse Oximetry Type: Continuous  Device (Oxygen Therapy): heated, high-flow nasal cannula, humidified  $ High Flow Nasal Cannula Set-Up: yes  Flow (L/min): 25  Oxygen Concentration (%): 100  Flowsheet Rows      First Filed Value   Admission Height  180.3 cm (71\") Documented at 11/30/2020 2358   Admission Weight  104 kg (230 lb) Documented at 11/30/2020 2358        Intake & Output (last 3 days)       11/29 0701 - 11/30 0700 11/30 0701 - 12/01 0700 12/01 0701 - 12/02 0700 12/02 0701 - 12/03 0700    P.O.   0     Total Intake(mL/kg)   0 (0)     Urine (mL/kg/hr)  150 1200 (0.6)     Stool   0     Total Output  150 1200     Net  -150 -1200             Stool Unmeasured Occurrence   1 x         Lines, Drains & Airways    Active LDAs     Name:   Placement date:   Placement time:   Site:   Days:    Peripheral IV 12/01/20 0039 Right Antecubital   12/01/20 0039    Antecubital   1                  Physical Exam:    Physical Exam  Vitals signs and nursing note reviewed.   Constitutional:       General: He is not in acute distress.     Appearance: Normal appearance. He is well-developed. He is not ill-appearing, toxic-appearing or diaphoretic.   HENT:      Head: Normocephalic and atraumatic.      Right Ear: Ear canal and external ear normal.      Left Ear: Ear canal and external ear normal.      Nose: Nose normal. No congestion or rhinorrhea.      Mouth/Throat:      Mouth: Mucous membranes are moist.      Pharynx: No oropharyngeal exudate.   Eyes:      General: No scleral icterus.        Right eye: No discharge.         Left eye: No discharge.      Extraocular Movements: Extraocular movements intact.      Conjunctiva/sclera: Conjunctivae normal.      Pupils: Pupils are equal, round, and reactive to light.   Neck:      Musculoskeletal: Normal range of motion " and neck supple. No neck rigidity or muscular tenderness.      Thyroid: No thyromegaly.      Vascular: No carotid bruit or JVD.      Trachea: No tracheal deviation.   Cardiovascular:      Rate and Rhythm: Normal rate and regular rhythm.      Pulses: Normal pulses.      Heart sounds: Normal heart sounds. No murmur. No friction rub. No gallop.    Pulmonary:      Effort: Accessory muscle usage and respiratory distress present.      Breath sounds: No stridor. Decreased breath sounds and rales present. No wheezing or rhonchi.   Chest:      Chest wall: No tenderness.   Abdominal:      General: Bowel sounds are normal. There is no distension.      Palpations: Abdomen is soft. There is no mass.      Tenderness: There is no abdominal tenderness. There is no guarding or rebound.      Hernia: No hernia is present.   Musculoskeletal: Normal range of motion.         General: No swelling, tenderness, deformity or signs of injury.      Right lower leg: No edema.      Left lower leg: No edema.   Lymphadenopathy:      Cervical: No cervical adenopathy.   Skin:     General: Skin is warm and dry.      Coloration: Skin is not jaundiced or pale.      Findings: No bruising, erythema or rash.   Neurological:      General: No focal deficit present.      Mental Status: He is alert and oriented to person, place, and time. Mental status is at baseline.      Cranial Nerves: No cranial nerve deficit.      Sensory: No sensory deficit.      Motor: No weakness or abnormal muscle tone.      Coordination: Coordination normal.   Psychiatric:         Mood and Affect: Mood normal.         Behavior: Behavior normal.         Thought Content: Thought content normal.         Judgment: Judgment normal.               Procedures:              Results Review:     I reviewed the patient's new clinical results.      Lab Results (last 24 hours)     Procedure Component Value Units Date/Time    aPTT [[id number]]  (Abnormal) Collected: 12/02/20 0804    Specimen: Blood  Updated: 12/02/20 0919     PTT >139.0 seconds     D-dimer, Quantitative [[id number]]  (Abnormal) Collected: 12/02/20 0804    Specimen: Blood Updated: 12/02/20 0919     D-Dimer, Quantitative 0.89 mg/L (FEU)     Narrative:      Reference Range  --------------------------------------------------------------------     < 0.50   Negative Predictive Value  0.50-0.59   Indeterminate    >= 0.60   Probable VTE             A very low percentage of patients with DVT may yield D-Dimer results   below the cut-off of 0.50 mg/L FEU.  This is known to be more   prevalent in patients with distal DVT.             Results of this test should always be interpreted in conjunction with   the patient's medical history, clinical presentation and other   findings.  Clinical diagnosis should not be based on the result of   INNOVANCE D-Dimer alone.    Fibrinogen [[id number]]  (Abnormal) Collected: 12/02/20 0804    Specimen: Blood Updated: 12/02/20 0919     Fibrinogen 576 mg/dL     Ferritin [[id number]]  (Normal) Collected: 12/02/20 0804    Specimen: Blood Updated: 12/02/20 0903     Ferritin 362.10 ng/mL     Narrative:      Results may be falsely decreased if patient taking Biotin.      Comprehensive Metabolic Panel [[id number]]  (Abnormal) Collected: 12/02/20 0804    Specimen: Blood Updated: 12/02/20 0858     Glucose 135 mg/dL      BUN 40 mg/dL      Creatinine 1.12 mg/dL      Sodium 144 mmol/L      Potassium 4.3 mmol/L      Chloride 107 mmol/L      CO2 26.0 mmol/L      Calcium 8.8 mg/dL      Total Protein 6.5 g/dL      Albumin 3.30 g/dL      ALT (SGPT) 23 U/L      AST (SGOT) 28 U/L      Alkaline Phosphatase 142 U/L      Total Bilirubin 0.5 mg/dL      eGFR Non African Amer 65 mL/min/1.73      Globulin 3.2 gm/dL      A/G Ratio 1.0 g/dL      BUN/Creatinine Ratio 35.7     Anion Gap 11.0 mmol/L     Narrative:      GFR Normal >60  Chronic Kidney Disease <60  Kidney Failure <15      CK [[id number]]  (Normal) Collected: 12/02/20 0804    Specimen: Blood  Updated: 12/02/20 0858     Creatine Kinase 87 U/L     Magnesium [[id number]]  (Normal) Collected: 12/02/20 0804    Specimen: Blood Updated: 12/02/20 0858     Magnesium 2.0 mg/dL     CBC & Differential [[id number]]  (Abnormal) Collected: 12/02/20 0804    Specimen: Blood Updated: 12/02/20 0831    Narrative:      The following orders were created for panel order CBC & Differential.  Procedure                               Abnormality         Status                     ---------                               -----------         ------                     CBC Auto Differential[[id number]]        Abnormal            Final result                 Please view results for these tests on the individual orders.    CBC Auto Differential [[id number]]  (Abnormal) Collected: 12/02/20 0804    Specimen: Blood Updated: 12/02/20 0831     WBC 5.80 10*3/mm3      RBC 3.95 10*6/mm3      Hemoglobin 12.0 g/dL      Hematocrit 37.5 %      MCV 94.9 fL      MCH 30.4 pg      MCHC 32.1 g/dL      RDW 15.2 %      RDW-SD 51.2 fl      MPV 8.2 fL      Platelets 197 10*3/mm3      Neutrophil % 89.1 %      Lymphocyte % 4.9 %      Monocyte % 5.6 %      Eosinophil % 0.0 %      Basophil % 0.4 %      Neutrophils, Absolute 5.20 10*3/mm3      Lymphocytes, Absolute 0.30 10*3/mm3      Monocytes, Absolute 0.30 10*3/mm3      Eosinophils, Absolute 0.00 10*3/mm3      Basophils, Absolute 0.00 10*3/mm3      nRBC 0.0 /100 WBC     C-reactive Protein [[id number]] Collected: 12/02/20 0804    Specimen: Blood Updated: 12/02/20 0825    Lactate Dehydrogenase [[id number]] Collected: 12/02/20 0804    Specimen: Blood Updated: 12/02/20 0825    Respiratory Panel PCR w/COVID-19(SARS-CoV-2) ARNAV/PURNIMA/HOLLY/PAD/COR/MAD/GLADYS In-House, NP Swab in [facility], 3-4 HR TAT - Swab, Nasopharynx [[id number]]  (Abnormal) Collected: 12/02/20 0548    Specimen: Swab from Nasopharynx Updated: 12/02/20 0659     ADENOVIRUS, PCR Not Detected     Coronavirus 229E Not Detected     Coronavirus HKU1 Not Detected      Coronavirus NL63 Not Detected     Coronavirus OC43 Not Detected     COVID19 Detected     Human Metapneumovirus Not Detected     Human Rhinovirus/Enterovirus Not Detected     Influenza A PCR Not Detected     Influenza B PCR Not Detected     Parainfluenza Virus 1 Not Detected     Parainfluenza Virus 2 Not Detected     Parainfluenza Virus 3 Not Detected     Parainfluenza Virus 4 Not Detected     RSV, PCR Not Detected     Bordetella pertussis pcr Not Detected     Bordetella parapertussis PCR Not Detected     Chlamydophila pneumoniae PCR Not Detected     Mycoplasma pneumo by PCR Not Detected    Narrative:      Fact sheet for providers: https://[employer]/wp-content/uploads/DBC0621-3253-JB3.1-EUA-Provider-Fact-Sheet-3.pdf    Fact sheet for patients: https://[employer]/wp-content/uploads/EJF0413-1968-WP5.1-EUA-Patient-Fact-Sheet-1.pdf    aPTT [[id number]]  (Abnormal) Collected: 12/01/20 2335    Specimen: Blood Updated: 12/02/20 0032     PTT >139.0 seconds     Blood Culture - Blood, Arm, Right [[id number]] Collected: 12/01/20 0020    Specimen: Blood from Arm, Right Updated: 12/02/20 0030     Blood Culture No growth at 24 hours    Blood Culture - Blood, Arm, Left [[id number]]  (Abnormal) Collected: 12/01/20 0020    Specimen: Blood from Arm, Left Updated: 12/01/20 2242     Blood Culture Abnormal Stain     Gram Stain Aerobic Bottle Gram positive cocci in clusters    Blood Culture ID, PCR - Blood, Arm, Left [[id number]]  (Abnormal) Collected: 12/01/20 0020    Specimen: Blood from Arm, Left Updated: 12/01/20 2241     BCID, PCR Staphylococcus spp, not aureus. Identification by BCID PCR.    C-reactive Protein [[id number]]  (Abnormal) Collected: 12/01/20 0453    Specimen: Blood Updated: 12/01/20 1207     C-Reactive Protein 17.52 mg/dL     aPTT [[id number]]  (Abnormal) Collected: 12/01/20 1102    Specimen: Blood Updated: 12/01/20 1122     PTT 31.9 seconds     Protime-INR [[id number]]  (Normal) Collected: 12/01/20 110     Specimen: Blood Updated: 12/01/20 1122     Protime 11.5 Seconds      INR 1.05    Hemoglobin A1c [[id number]]  (Abnormal) Collected: 12/01/20 0453    Specimen: Blood Updated: 12/01/20 1019     Hemoglobin A1C 5.9 %     Narrative:      Hemoglobin A1C Reference Range:    <5.7 %        Normal  5.7-6.4 %     Increased risk for diabetes  > 6.4 %        Diabetes       These guidelines have been recommended by the American Diabetic Association for Hgb A1c.      The following 2010 guidelines have been recommended by the American Diabetes Association for Hemoglobin A1c.    HBA1c 5.7-6.4% Increased risk for future diabetes (pre-diabetes)  HBA1c     >6.4% Diabetes          Hemoglobin A1C   Date Value Ref Range Status   12/01/2020 5.9 (H) 3.5 - 5.6 % Final     Results from last 7 days   Lab Units 12/01/20  1102 12/01/20  0020   INR  1.05 1.17*       Results from last 7 days   Lab Units 12/01/20  0027   PH, ARTERIAL pH units 7.396   PO2 ART mm Hg 64.2*   PCO2, ARTERIAL mm Hg 43.2   HCO3 ART mmol/L 26.5     No results found for: LIPASE  No results found for: CHOL, CHLPL, TRIG, HDL, LDL, LDLDIRECT    No results found for: INTRAOP, PREDX, FINALDX, COMDX    Microbiology Results (last 10 days)     Procedure Component Value - Date/Time    Respiratory Panel PCR w/COVID-19(SARS-CoV-2) ARNAV/PURNIMA/HOLLY/PAD/COR/MAD/GLADYS In-House, NP Swab in UTM/VTM, 3-4 HR TAT - Swab, Nasopharynx [[id number]]  (Abnormal) Collected: 12/02/20 0548    Lab Status: Final result Specimen: Swab from Nasopharynx Updated: 12/02/20 0659     ADENOVIRUS, PCR Not Detected     Coronavirus 229E Not Detected     Coronavirus HKU1 Not Detected     Coronavirus NL63 Not Detected     Coronavirus OC43 Not Detected     COVID19 Detected     Human Metapneumovirus Not Detected     Human Rhinovirus/Enterovirus Not Detected     Influenza A PCR Not Detected     Influenza B PCR Not Detected     Parainfluenza Virus 1 Not Detected     Parainfluenza Virus 2 Not Detected     Parainfluenza Virus 3  Not Detected     Parainfluenza Virus 4 Not Detected     RSV, PCR Not Detected     Bordetella pertussis pcr Not Detected     Bordetella parapertussis PCR Not Detected     Chlamydophila pneumoniae PCR Not Detected     Mycoplasma pneumo by PCR Not Detected    Narrative:      Fact sheet for providers: https://docs.[employer]/wp-content/uploads/MOE6737-8910-IQ0.1-EUA-Provider-Fact-Sheet-3.pdf    Fact sheet for patients: https://docs.[employer]/wp-content/uploads/CZL1534-0068-FK4.1-EUA-Patient-Fact-Sheet-1.pdf    Respiratory Panel, PCR (WITHOUT COVID) - Swab, Nasopharynx [[id number]]  (Normal) Collected: 12/01/20 0507    Lab Status: Final result Specimen: Swab from Nasopharynx Updated: 12/01/20 0704     ADENOVIRUS, PCR Not Detected     Coronavirus 229E Not Detected     Coronavirus HKU1 Not Detected     Coronavirus NL63 Not Detected     Coronavirus OC43 Not Detected     Human Metapneumovirus Not Detected     Human Rhinovirus/Enterovirus Not Detected     Influenza B PCR Not Detected     Parainfluenza Virus 1 Not Detected     Parainfluenza Virus 2 Not Detected     Parainfluenza Virus 3 Not Detected     Parainfluenza Virus 4 Not Detected     Bordetella pertussis pcr Not Detected     Influenza A H1 2009 PCR Not Detected     Chlamydophila pneumoniae PCR Not Detected     Mycoplasma pneumo by PCR Not Detected     Influenza A PCR Not Detected     Influenza A H3 Not Detected     Influenza A H1 Not Detected     RSV, PCR Not Detected     Bordetella parapertussis PCR Not Detected    Narrative:      The coronavirus on the RVP is NOT COVID-19 and is NOT indicative of infection with COVID-19.     Blood Culture - Blood, Arm, Right [[id number]] Collected: 12/01/20 0020    Lab Status: Preliminary result Specimen: Blood from Arm, Right Updated: 12/02/20 0030     Blood Culture No growth at 24 hours    Blood Culture - Blood, Arm, Left [[id number]]  (Abnormal) Collected: 12/01/20 0020    Lab Status: Preliminary result Specimen: Blood from  Arm, Left Updated: 12/01/20 2242     Blood Culture Abnormal Stain     Gram Stain Aerobic Bottle Gram positive cocci in clusters    Blood Culture ID, PCR - Blood, Arm, Left [[id number]]  (Abnormal) Collected: 12/01/20 0020    Lab Status: Final result Specimen: Blood from Arm, Left Updated: 12/01/20 2241     BCID, PCR Staphylococcus spp, not aureus. Identification by BCID PCR.          ECG/EMG Results (most recent)     Procedure Component Value Units Date/Time    ECG 12 Lead [[id number]]  (Abnormal) Resulted: 12/01/20 0256     Updated: 12/01/20 0256    ECG 12 Lead [[id number]] Collected: 12/01/20 0012     Updated: 12/01/20 1524     QT Interval 447 ms     Narrative:      HEART RATE= 91  bpm  RR Interval= 659  ms  NC Interval=   ms  P Horizontal Axis=   deg  P Front Axis=   deg  QRSD Interval= 93  ms  QT Interval= 447  ms  QRS Axis= -36  deg  T Wave Axis= -23  deg  - ABNORMAL ECG -  Atrial fibrillation  Inferior infarct, age indeterminate  Anterior infarct, old  Prolonged QT interval  No previous ECG available for comparison  Electronically Signed By: Ryne Portillo (HOLLY) 01-Dec-2020 15:22:22  Date and Time of Study: 2020-12-01 00:12:20                    Xr Chest 1 View    Result Date: 12/1/2020  Diffuse bilateral infiltrates consistent with multifocal pneumonia. Electronically signed by:  Naveed Welch M.D.  12/1/2020 12:26 AM    Ct Chest Pulmonary Embolism    Result Date: 12/1/2020  1. Likely subacute pulmonary embolus within the distal main pulmonary artery on the left extending into the interlobar artery of the left lower lobe. 2. No evidence of thoracic aortic aneurysm or dissection. 3. Findings throughout the lungs likely related to a Covid pneumonia given the patient's clinical history. Follow-up to clearing recommended. Likely area of rounded atelectasis, left lower lobe. 4. Emphysema. These critical findings were discussed with JT Kimball Electronically signed by:  Costa Lou D.O.  12/1/2020 12:34  AM          Xrays, labs reviewed personally by physician.    Medication Review:   I have reviewed the patient's current medication list      Scheduled Meds  Acetylcysteine, 600 mg, Oral, BID  albuterol sulfate HFA, 2 puff, Inhalation, 4x Daily - RT  ammonium lactate, , Topical, Q12H  atorvastatin, 40 mg, Oral, Daily  budesonide-formoterol, 2 puff, Inhalation, BID - RT  bumetanide, 1 mg, Oral, Daily  busPIRone, 15 mg, Oral, TID  cefepime, 2 g, Intravenous, Q8H  cetirizine, 10 mg, Oral, Nightly  cloNIDine, 0.05 mg, Oral, Q12H  cyclobenzaprine, 5 mg, Oral, TID  dexamethasone, 12 mg, Intravenous, Daily  dilTIAZem CD, 120 mg, Oral, Daily  docusate sodium, 100 mg, Oral, TID  doxycycline, 100 mg, Oral, Q12H  escitalopram, 5 mg, Oral, Daily  guaiFENesin, 600 mg, Oral, Q12H  lidocaine, 1 patch, Transdermal, Q24H  melatonin, 10 mg, Oral, Nightly  metoclopramide, 10 mg, Oral, TID AC  montelukast, 10 mg, Oral, Nightly  pantoprazole, 40 mg, Oral, Q AM  potassium chloride, 20 mEq, Oral, Daily With Lunch  remdesivir, 100 mg, Intravenous, Q24H  simethicone, 120 mg, Oral, TID  sodium chloride, 10 mL, Intravenous, Q12H  tamsulosin, 0.4 mg, Oral, Daily  thiamine, 200 mg, Oral, Daily  traZODone, 75 mg, Oral, Nightly  vitamin C, 1,000 mg, Oral, Daily  vitamin D3, 5,000 Units, Oral, Daily  zinc sulfate, 220 mg, Oral, BID        Meds Infusions  heparin, 14.4 Units/kg/hr, Last Rate: 15.4 Units/kg/hr (12/02/20 0721)  Pharmacy Consult - Pharmacy to dose,         Meds PRN  •  acetaminophen **OR** acetaminophen **OR** acetaminophen  •  acetaminophen  •  benzonatate  •  bismuth subsalicylate  •  fleet enema  •  heparin  •  heparin  •  HYDROcodone-acetaminophen  •  lactulose  •  magnesium sulfate **OR** magnesium sulfate in D5W 1g/100mL (PREMIX)  •  melatonin  •  nitroglycerin  •  ondansetron **OR** ondansetron  •  Pharmacy Consult - Pharmacy to dose  •  polyethylene glycol  •  potassium chloride **OR** potassium chloride **OR** potassium  chloride  •  sodium chloride  •  sodium chloride  •  sodium chloride  •  witch hazel-glycerin        Assessment/Plan   Assessment/Plan     Active Hospital Problems    Diagnosis  POA   • **Acute respiratory failure due to COVID-19 (CMS/[facility]) [U07.1, J96.00]  Yes   • Pneumonia of both lungs due to infectious organism [J18.9]  Unknown   • Subacute pulmonary embolism (CMS/[facility]) [I26.99]  Unknown   • Renal disease [N28.9]  Yes   • CAD (coronary artery disease) [I25.10]  Yes   • Hypertension [I10]  Yes   • Stroke (CMS/[facility]) [I63.9]  Yes   • Mood disorder (CMS/[facility]) [F39]  Yes   • GERD (gastroesophageal reflux disease) [K21.9]  Yes   • Afib (CMS/[facility]) [I48.91]  Yes   • Insomnia [G47.00]  Yes   • BPH (benign prostatic hyperplasia) [N40.0]  Yes      Resolved Hospital Problems   No resolved problems to display.       MEDICAL DECISION MAKING COMPLEXITY BY PROBLEM:        Acute respiratory failure due to Covid-19, Pneumonia due to Covid-19  -ABG: showed ph 7.39, CO2 43.2, O2 64, HCO3 26.5 on 100% non-rebreather  -currently on Bipap at 100%  -CT PE protocol: likely subacute pulmonary embolus within the distal main pulmonary artery on the left extending into the interlobar artery of the left lower lobe. Findings throughout the lungs likely related to Covid pneumonia. Emphysema  -, ferritin normal, procalcitonin 0.39, d-dimer 0.62, WBC normal, temp 99.2  -given IV rocephin, zithromax, and decadron in the ER  -Covid positive on 11/23/20, consult pharmacy for possible remdesivir   -continue albuterol, decadron, vitamins, antibiotics for facility related pneumonia-consult pulmonary for further recommendations      Acute pulmonary embolus  -CT PE protocol as above  -Now on heparin drip  Switch to Eliquis or Coumadin. Will discuss with Dr. Mishra.     CKD  -creatinine 1.31  -monitor BMP     COPD  -on oxygen at night at baseline  -tx as above     Chronic atrial fibrillation  -cont home cardizem     Hypertension  -cont home cardizem,  clonidine, losartan, bumex     Previous CVA  -on chronic anticoagulation, ?No statin listed on MAR     Depression  -cont home buspar, lexapro     Chronic pain  -on norco (Verified by Rx by [facility])     Chronic constipation  -prn laxatives      BPH  -cont home flomax        VTE Prophylaxis - switch to heparin drip       VTE Prophylaxis -   Mechanical Order History:     None      Pharmalogical Order History:      Ordered     Dose Route Frequency Stop    12/01/20 0309  enoxaparin (LOVENOX) syringe 40 mg  Status:  Discontinued      40 mg SC Every 24 Hours 12/01/20 1041    12/01/20 1042  heparin 16547 units/250 mL (100 units/mL) in 0.45 % NaCl infusion  14.97 mL/hr      14.4 Units/kg/hr IV Titrated --    12/01/20 1042  heparin bolus from bag 4,200 Units      40 Units/kg IV Every 6 Hours PRN --    12/01/20 1042  heparin bolus from bag 8,300 Units      80 Units/kg IV Every 6 Hours PRN --    12/01/20 0238  enoxaparin (LOVENOX) syringe 100 mg  Status:  Discontinued      1 mg/kg SC Once 12/01/20 0239                  Code Status -   Code Status and Medical Interventions:   Ordered at: 12/01/20 0308     Code Status:    CPR     Medical Interventions (Level of Support Prior to Arrest):    Full       This patient has been examined wearing appropriate Personal Protective Equipment and discussed with hospital infection control department. 12/02/20        Discharge Planning  nh        Electronically signed by Mono Estes MD, 12/02/20, 09:50 EST.  [facility] Hospitalist Team

## 2020-12-02 NOTE — PROGRESS NOTES
Continued Stay Note  [facility]     Patient Name: Gordon Dowling  MRN: [id number]  Today's Date: 12/2/2020    Admit Date: 12/1/2020    Discharge Plan     Row Name 12/02/20 1323       Plan    Plan  [place] Plan: [place] [facility]. Okay to return per facility liaison, needs confirmed with pt (confused, no emergency contact-see SW note 12/2). No PASRR or pre-cert needed for return.    Plan Comments  SW contacted [facility] liaison regarding emergency contact for pt. Liaison connected this SW to facility . SW was informed by facility  that pt didn't have family and the only person he had them call was the  Yesica (at [facility], [phone]), previous facility he was at prior to. SW inquired about advance directives appointing representative & was informed he doesn't have any on file at facility. SW attempted to contact  & was unable to leave a voicemail. Text sent for call back. SW contacted [facility] representative (Ingrid Dominguez) regarding pt & she states that pt's support system is limited, but if she remembered correctly he may have a sister. Ingrid is going to check file and return SWs call. At this time, pt is reported to be confused. Anticipate return to [facility] once d/c ready.     Phone communication only - no physical contact with patient or family.    DARIANA Sahu    Phone: [phone]  Cell: [phone]  Fax: [phone]  [email or web address]

## 2020-12-02 NOTE — PLAN OF CARE
Problem: Adult Inpatient Plan of Care  Goal: Plan of Care Review  Outcome: Ongoing, Progressing  Flowsheets  Taken 12/2/2020 1627 by Veronica Duque RN  Progress: no change  Plan of Care Reviewed With: patient  Taken 12/2/2020 0708 by Tiki Choi RN  Outcome Summary: Patient in hospital covid test came back positive. Patient is currently on PF 25L at 100%.  Goal: Patient-Specific Goal (Individualized)  Outcome: Ongoing, Progressing  Goal: Absence of Hospital-Acquired Illness or Injury  Outcome: Ongoing, Progressing  Intervention: Identify and Manage Fall Risk  Recent Flowsheet Documentation  Taken 12/2/2020 1600 by Veronica Duque RN  Safety Promotion/Fall Prevention: safety round/check completed  Taken 12/2/2020 1400 by Veronica Duque RN  Safety Promotion/Fall Prevention: safety round/check completed  Taken 12/2/2020 1200 by Veronica Duque RN  Safety Promotion/Fall Prevention: safety round/check completed  Taken 12/2/2020 1000 by Veronica Duque RN  Safety Promotion/Fall Prevention: safety round/check completed  Taken 12/2/2020 0800 by Veronica Duque RN  Safety Promotion/Fall Prevention: safety round/check completed  Intervention: Prevent and Manage VTE (venous thromboembolism) Risk  Recent Flowsheet Documentation  Taken 12/2/2020 0800 by Veronica Duque RN  VTE Prevention/Management: bleeding risk factor(s) identified  Goal: Optimal Comfort and Wellbeing  Outcome: Ongoing, Progressing  Intervention: Provide Person-Centered Care  Recent Flowsheet Documentation  Taken 12/2/2020 0800 by Veronica Duque RN  Trust Relationship/Rapport:   care explained   choices provided  Goal: Readiness for Transition of Care  Outcome: Ongoing, Progressing   Goal Outcome Evaluation:  Plan of Care Reviewed With: patient  Progress: no change  Outcome Summary: patient on PF 25 L 100%. more alert. will continue to monitor

## 2020-12-03 LAB
ALBUMIN SERPL-MCNC: 3 G/DL (ref 3.5–5.2)
ALBUMIN/GLOB SERPL: 1 G/DL
ALP SERPL-CCNC: 95 U/L (ref 39–117)
ALT SERPL W P-5'-P-CCNC: 20 U/L (ref 1–41)
ANION GAP SERPL CALCULATED.3IONS-SCNC: 8 MMOL/L (ref 5–15)
APTT PPP: 105.7 SECONDS (ref 61–76.5)
AST SERPL-CCNC: 21 U/L (ref 1–40)
BASOPHILS # BLD AUTO: 0 10*3/MM3 (ref 0–0.2)
BASOPHILS NFR BLD AUTO: 0.1 % (ref 0–1.5)
BILIRUB SERPL-MCNC: 0.4 MG/DL (ref 0–1.2)
BUN SERPL-MCNC: 41 MG/DL (ref 8–23)
BUN/CREAT SERPL: 40.2 (ref 7–25)
CALCIUM SPEC-SCNC: 8.4 MG/DL (ref 8.6–10.5)
CHLORIDE SERPL-SCNC: 106 MMOL/L (ref 98–107)
CK SERPL-CCNC: 59 U/L (ref 20–200)
CO2 SERPL-SCNC: 26 MMOL/L (ref 22–29)
CREAT SERPL-MCNC: 1.02 MG/DL (ref 0.76–1.27)
CRP SERPL-MCNC: 6.97 MG/DL (ref 0–0.5)
DEPRECATED RDW RBC AUTO: 50.3 FL (ref 37–54)
EOSINOPHIL # BLD AUTO: 0 10*3/MM3 (ref 0–0.4)
EOSINOPHIL NFR BLD AUTO: 0 % (ref 0.3–6.2)
ERYTHROCYTE [DISTWIDTH] IN BLOOD BY AUTOMATED COUNT: 15 % (ref 12.3–15.4)
FERRITIN SERPL-MCNC: 348.5 NG/ML (ref 30–400)
GFR SERPL CREATININE-BSD FRML MDRD: 73 ML/MIN/1.73
GLOBULIN UR ELPH-MCNC: 3.1 GM/DL
GLUCOSE SERPL-MCNC: 144 MG/DL (ref 65–99)
HCT VFR BLD AUTO: 35.6 % (ref 37.5–51)
HGB BLD-MCNC: 11.5 G/DL (ref 13–17.7)
LYMPHOCYTES # BLD AUTO: 0.2 10*3/MM3 (ref 0.7–3.1)
LYMPHOCYTES NFR BLD AUTO: 4.3 % (ref 19.6–45.3)
MAGNESIUM SERPL-MCNC: 1.9 MG/DL (ref 1.6–2.4)
MCH RBC QN AUTO: 31.2 PG (ref 26.6–33)
MCHC RBC AUTO-ENTMCNC: 32.5 G/DL (ref 31.5–35.7)
MCV RBC AUTO: 96.1 FL (ref 79–97)
MONOCYTES # BLD AUTO: 0.4 10*3/MM3 (ref 0.1–0.9)
MONOCYTES NFR BLD AUTO: 7 % (ref 5–12)
NEUTROPHILS NFR BLD AUTO: 4.5 10*3/MM3 (ref 1.7–7)
NEUTROPHILS NFR BLD AUTO: 88.6 % (ref 42.7–76)
NRBC BLD AUTO-RTO: 0 /100 WBC (ref 0–0.2)
PLATELET # BLD AUTO: 205 10*3/MM3 (ref 140–450)
PMV BLD AUTO: 8.4 FL (ref 6–12)
POTASSIUM SERPL-SCNC: 4.7 MMOL/L (ref 3.5–5.2)
PROT SERPL-MCNC: 6.1 G/DL (ref 6–8.5)
RBC # BLD AUTO: 3.7 10*6/MM3 (ref 4.14–5.8)
SODIUM SERPL-SCNC: 140 MMOL/L (ref 136–145)
WBC # BLD AUTO: 5.1 10*3/MM3 (ref 3.4–10.8)

## 2020-12-03 PROCEDURE — 82550 ASSAY OF CK (CPK): CPT | Performed by: STUDENT IN AN ORGANIZED HEALTH CARE EDUCATION/TRAINING PROGRAM

## 2020-12-03 PROCEDURE — 25010000002 HEPARIN (PORCINE) 25000-0.45 UT/250ML-% SOLUTION: Performed by: NURSE PRACTITIONER

## 2020-12-03 PROCEDURE — 94799 UNLISTED PULMONARY SVC/PX: CPT

## 2020-12-03 PROCEDURE — 83735 ASSAY OF MAGNESIUM: CPT | Performed by: STUDENT IN AN ORGANIZED HEALTH CARE EDUCATION/TRAINING PROGRAM

## 2020-12-03 PROCEDURE — 86140 C-REACTIVE PROTEIN: CPT | Performed by: STUDENT IN AN ORGANIZED HEALTH CARE EDUCATION/TRAINING PROGRAM

## 2020-12-03 PROCEDURE — 80053 COMPREHEN METABOLIC PANEL: CPT | Performed by: STUDENT IN AN ORGANIZED HEALTH CARE EDUCATION/TRAINING PROGRAM

## 2020-12-03 PROCEDURE — 25010000002 CEFEPIME PER 500 MG: Performed by: NURSE PRACTITIONER

## 2020-12-03 PROCEDURE — 99233 SBSQ HOSP IP/OBS HIGH 50: CPT | Performed by: INTERNAL MEDICINE

## 2020-12-03 PROCEDURE — 25010000002 DEXAMETHASONE SODIUM PHOSPHATE 20 MG/5ML SOLUTION 5 ML VIAL: Performed by: INTERNAL MEDICINE

## 2020-12-03 PROCEDURE — 85025 COMPLETE CBC W/AUTO DIFF WBC: CPT | Performed by: STUDENT IN AN ORGANIZED HEALTH CARE EDUCATION/TRAINING PROGRAM

## 2020-12-03 PROCEDURE — 85730 THROMBOPLASTIN TIME PARTIAL: CPT | Performed by: INTERNAL MEDICINE

## 2020-12-03 PROCEDURE — 82728 ASSAY OF FERRITIN: CPT | Performed by: STUDENT IN AN ORGANIZED HEALTH CARE EDUCATION/TRAINING PROGRAM

## 2020-12-03 RX ADMIN — CETIRIZINE HYDROCHLORIDE 10 MG: 10 TABLET, FILM COATED ORAL at 20:13

## 2020-12-03 RX ADMIN — Medication 10 ML: at 20:13

## 2020-12-03 RX ADMIN — GUAIFENESIN 600 MG: 600 TABLET, EXTENDED RELEASE ORAL at 08:24

## 2020-12-03 RX ADMIN — CLONIDINE HYDROCHLORIDE 0.05 MG: 0.1 TABLET ORAL at 08:23

## 2020-12-03 RX ADMIN — ESCITALOPRAM OXALATE 5 MG: 10 TABLET ORAL at 08:26

## 2020-12-03 RX ADMIN — CEFEPIME HYDROCHLORIDE 2 G: 2 INJECTION, POWDER, FOR SOLUTION INTRAVENOUS at 06:36

## 2020-12-03 RX ADMIN — DOCUSATE SODIUM 100 MG: 100 CAPSULE, LIQUID FILLED ORAL at 16:51

## 2020-12-03 RX ADMIN — METOCLOPRAMIDE 10 MG: 10 TABLET ORAL at 16:52

## 2020-12-03 RX ADMIN — MONTELUKAST 10 MG: 10 TABLET, FILM COATED ORAL at 20:15

## 2020-12-03 RX ADMIN — CYCLOBENZAPRINE HYDROCHLORIDE 5 MG: 10 TABLET, FILM COATED ORAL at 20:13

## 2020-12-03 RX ADMIN — ALBUTEROL SULFATE 2 PUFF: 108 AEROSOL, METERED RESPIRATORY (INHALATION) at 20:48

## 2020-12-03 RX ADMIN — CEFEPIME HYDROCHLORIDE 2 G: 2 INJECTION, POWDER, FOR SOLUTION INTRAVENOUS at 20:12

## 2020-12-03 RX ADMIN — PANTOPRAZOLE SODIUM 40 MG: 40 TABLET, DELAYED RELEASE ORAL at 06:36

## 2020-12-03 RX ADMIN — HYDROCODONE BITARTRATE AND ACETAMINOPHEN 1 TABLET: 5; 325 TABLET ORAL at 08:25

## 2020-12-03 RX ADMIN — DOCUSATE SODIUM 100 MG: 100 CAPSULE, LIQUID FILLED ORAL at 20:14

## 2020-12-03 RX ADMIN — DOXYCYCLINE 100 MG: 100 TABLET, FILM COATED ORAL at 08:24

## 2020-12-03 RX ADMIN — CEFEPIME HYDROCHLORIDE 2 G: 2 INJECTION, POWDER, FOR SOLUTION INTRAVENOUS at 12:21

## 2020-12-03 RX ADMIN — Medication 10 MG: at 20:15

## 2020-12-03 RX ADMIN — OXYCODONE HYDROCHLORIDE AND ACETAMINOPHEN 1000 MG: 500 TABLET ORAL at 08:26

## 2020-12-03 RX ADMIN — Medication 200 MG: at 08:25

## 2020-12-03 RX ADMIN — BUSPIRONE HYDROCHLORIDE 15 MG: 10 TABLET ORAL at 08:26

## 2020-12-03 RX ADMIN — DILTIAZEM HYDROCHLORIDE 120 MG: 120 CAPSULE, COATED, EXTENDED RELEASE ORAL at 08:26

## 2020-12-03 RX ADMIN — Medication: at 20:13

## 2020-12-03 RX ADMIN — GUAIFENESIN 600 MG: 600 TABLET, EXTENDED RELEASE ORAL at 20:13

## 2020-12-03 RX ADMIN — BUSPIRONE HYDROCHLORIDE 15 MG: 10 TABLET ORAL at 16:52

## 2020-12-03 RX ADMIN — Medication 10 ML: at 08:27

## 2020-12-03 RX ADMIN — TRAZODONE HYDROCHLORIDE 75 MG: 50 TABLET ORAL at 20:14

## 2020-12-03 RX ADMIN — Medication 600 MG: at 08:26

## 2020-12-03 RX ADMIN — Medication 600 MG: at 22:24

## 2020-12-03 RX ADMIN — ZINC SULFATE 220 MG (50 MG) CAPSULE 220 MG: CAPSULE at 20:13

## 2020-12-03 RX ADMIN — DOXYCYCLINE 100 MG: 100 TABLET, FILM COATED ORAL at 20:14

## 2020-12-03 RX ADMIN — SIMETHICONE 120 MG: 80 TABLET, CHEWABLE ORAL at 20:15

## 2020-12-03 RX ADMIN — HYDROCODONE BITARTRATE AND ACETAMINOPHEN 1 TABLET: 5; 325 TABLET ORAL at 20:14

## 2020-12-03 RX ADMIN — POTASSIUM CHLORIDE 20 MEQ: 1500 TABLET, EXTENDED RELEASE ORAL at 12:22

## 2020-12-03 RX ADMIN — BUSPIRONE HYDROCHLORIDE 15 MG: 10 TABLET ORAL at 20:14

## 2020-12-03 RX ADMIN — ALBUTEROL SULFATE 2 PUFF: 108 AEROSOL, METERED RESPIRATORY (INHALATION) at 07:10

## 2020-12-03 RX ADMIN — Medication 5000 UNITS: at 08:29

## 2020-12-03 RX ADMIN — BUDESONIDE AND FORMOTEROL FUMARATE DIHYDRATE 2 PUFF: 160; 4.5 AEROSOL RESPIRATORY (INHALATION) at 07:10

## 2020-12-03 RX ADMIN — METOCLOPRAMIDE 10 MG: 10 TABLET ORAL at 12:22

## 2020-12-03 RX ADMIN — HEPARIN SODIUM 14.4 UNITS/KG/HR: 10000 INJECTION, SOLUTION INTRAVENOUS at 06:32

## 2020-12-03 RX ADMIN — SODIUM CHLORIDE 100 MG: 9 INJECTION, SOLUTION INTRAVENOUS at 12:21

## 2020-12-03 RX ADMIN — SIMETHICONE 120 MG: 80 TABLET, CHEWABLE ORAL at 16:52

## 2020-12-03 RX ADMIN — METOCLOPRAMIDE 10 MG: 10 TABLET ORAL at 08:27

## 2020-12-03 RX ADMIN — ZINC SULFATE 220 MG (50 MG) CAPSULE 220 MG: CAPSULE at 08:25

## 2020-12-03 RX ADMIN — APIXABAN 10 MG: 5 TABLET, FILM COATED ORAL at 20:14

## 2020-12-03 RX ADMIN — BUMETANIDE 1 MG: 1 TABLET ORAL at 08:25

## 2020-12-03 RX ADMIN — TAMSULOSIN HYDROCHLORIDE 0.4 MG: 0.4 CAPSULE ORAL at 08:25

## 2020-12-03 RX ADMIN — ALBUTEROL SULFATE 2 PUFF: 108 AEROSOL, METERED RESPIRATORY (INHALATION) at 14:30

## 2020-12-03 RX ADMIN — BUDESONIDE AND FORMOTEROL FUMARATE DIHYDRATE 2 PUFF: 160; 4.5 AEROSOL RESPIRATORY (INHALATION) at 20:48

## 2020-12-03 RX ADMIN — DOCUSATE SODIUM 100 MG: 100 CAPSULE, LIQUID FILLED ORAL at 08:27

## 2020-12-03 RX ADMIN — ALBUTEROL SULFATE 2 PUFF: 108 AEROSOL, METERED RESPIRATORY (INHALATION) at 11:35

## 2020-12-03 RX ADMIN — SIMETHICONE 120 MG: 80 TABLET, CHEWABLE ORAL at 08:24

## 2020-12-03 RX ADMIN — CLONIDINE HYDROCHLORIDE 0.05 MG: 0.1 TABLET ORAL at 20:13

## 2020-12-03 RX ADMIN — DEXAMETHASONE SODIUM PHOSPHATE 12 MG: 4 INJECTION, SOLUTION INTRAMUSCULAR; INTRAVENOUS at 08:27

## 2020-12-03 RX ADMIN — CYCLOBENZAPRINE HYDROCHLORIDE 5 MG: 10 TABLET, FILM COATED ORAL at 16:51

## 2020-12-03 RX ADMIN — Medication 1 APPLICATION: at 08:22

## 2020-12-03 RX ADMIN — ATORVASTATIN CALCIUM 40 MG: 40 TABLET, FILM COATED ORAL at 08:24

## 2020-12-03 RX ADMIN — HYDROCODONE BITARTRATE AND ACETAMINOPHEN 1 TABLET: 5; 325 TABLET ORAL at 14:32

## 2020-12-03 RX ADMIN — APIXABAN 10 MG: 5 TABLET, FILM COATED ORAL at 12:22

## 2020-12-03 RX ADMIN — CYCLOBENZAPRINE HYDROCHLORIDE 5 MG: 10 TABLET, FILM COATED ORAL at 08:27

## 2020-12-03 NOTE — PLAN OF CARE
Goal Outcome Evaluation:  Plan of Care Reviewed With: patient  Progress: improving  Outcome Summary: titrating down prescision flow pt tolerating well

## 2020-12-03 NOTE — PROGRESS NOTES
Daily Progress Note        Acute respiratory failure due to COVID-19 (CMS/HCC)    Pneumonia of both lungs due to infectious organism    Subacute pulmonary embolism (CMS/HCC)    Renal disease    CAD (coronary artery disease)    Hypertension    Stroke (CMS/HCC)    Mood disorder (CMS/HCC)    GERD (gastroesophageal reflux disease)    Afib (CMS/HCC)    Insomnia    BPH (benign prostatic hyperplasia)      Assessment:  Acute hypoxic respiratory failure secondary to COVID-19 infection  CT scan possible subacute distal PE, nonobstructing patient is on Xarelto 20 mg as an outpatient for A. Fib  Acute COPD exacerbation  A. fib  History of CVA  CKD     Recommendations:  Remdesivir  Decadron  Empiric antibiotics doxycycline   Diuresis currently on Bumex 1 mg daily, home medication  Anti-inflammatory agents will include vitamin C vitamin D zinc and Mucomyst p.o.  Continue anticoagulation with heparin drip may switch to Eliquis or Xarelto  Aspirin            LOS: 1 day     Subjective         Objective     Vital signs for last 24 hours:  Vitals:    12/03/20 1135 12/03/20 1350 12/03/20 1430 12/03/20 1708   BP:  143/90  132/73   BP Location:       Patient Position:       Pulse: 86 95 98 93   Resp: 20 (!) 29 20    Temp:    97.5 °F (36.4 °C)   TempSrc:  Axillary  Axillary   SpO2: 97% 94% 92% 94%   Weight:       Height:           Intake/Output last 3 shifts:  I/O last 3 completed shifts:  In: 240 [P.O.:240]  Out: 650 [Urine:650]  Intake/Output this shift:  I/O this shift:  In: 180 [P.O.:180]  Out: 350 [Urine:350]      Radiology  Imaging Results (Last 24 Hours)     ** No results found for the last 24 hours. **          Labs:  Results from last 7 days   Lab Units 12/03/20  0519   WBC 10*3/mm3 5.10   HEMOGLOBIN g/dL 11.5*   HEMATOCRIT % 35.6*   PLATELETS 10*3/mm3 205     Results from last 7 days   Lab Units 12/03/20  0519   SODIUM mmol/L 140   POTASSIUM mmol/L 4.7   CHLORIDE mmol/L 106   CO2 mmol/L 26.0   BUN mg/dL 41*   CREATININE mg/dL  1.02   CALCIUM mg/dL 8.4*   BILIRUBIN mg/dL 0.4   ALK PHOS U/L 95   ALT (SGPT) U/L 20   AST (SGOT) U/L 21   GLUCOSE mg/dL 144*     Results from last 7 days   Lab Units 12/01/20  0027   PH, ARTERIAL pH units 7.396   PO2 ART mm Hg 64.2*   PCO2, ARTERIAL mm Hg 43.2   HCO3 ART mmol/L 26.5     Results from last 7 days   Lab Units 12/03/20 0519 12/02/20  0804 12/01/20  0020   ALBUMIN g/dL 3.00* 3.30* 4.30     Results from last 7 days   Lab Units 12/03/20 0519 12/02/20  0804 12/01/20  0453 12/01/20  0141   CK TOTAL U/L 59 87  --   --    TROPONIN T ng/mL  --   --  <0.010 0.011         Results from last 7 days   Lab Units 12/03/20 0519   MAGNESIUM mg/dL 1.9     Results from last 7 days   Lab Units 12/03/20 0519 12/02/20  2019 12/02/20  0804  12/01/20  1102 12/01/20  0020   INR   --   --   --   --  1.05 1.17*   APTT seconds 105.7* 54.4* >139.0*   < > 31.9* 34.2*    < > = values in this interval not displayed.               Meds:   SCHEDULE  Acetylcysteine, 600 mg, Oral, BID  albuterol sulfate HFA, 2 puff, Inhalation, 4x Daily - RT  ammonium lactate, , Topical, Q12H  apixaban, 10 mg, Oral, BID    Followed by  [START ON 12/10/2020] apixaban, 5 mg, Oral, BID  atorvastatin, 40 mg, Oral, Daily  budesonide-formoterol, 2 puff, Inhalation, BID - RT  bumetanide, 1 mg, Oral, Daily  busPIRone, 15 mg, Oral, TID  cefepime, 2 g, Intravenous, Q8H  cetirizine, 10 mg, Oral, Nightly  cloNIDine, 0.05 mg, Oral, Q12H  cyclobenzaprine, 5 mg, Oral, TID  dexamethasone, 12 mg, Intravenous, Daily  dilTIAZem CD, 120 mg, Oral, Daily  docusate sodium, 100 mg, Oral, TID  doxycycline, 100 mg, Oral, Q12H  escitalopram, 5 mg, Oral, Daily  guaiFENesin, 600 mg, Oral, Q12H  lidocaine, 1 patch, Transdermal, Q24H  melatonin, 10 mg, Oral, Nightly  metoclopramide, 10 mg, Oral, TID AC  montelukast, 10 mg, Oral, Nightly  pantoprazole, 40 mg, Oral, Q AM  potassium chloride, 20 mEq, Oral, Daily With Lunch  remdesivir, 100 mg, Intravenous, Q24H  simethicone, 120 mg,  Oral, TID  sodium chloride, 10 mL, Intravenous, Q12H  tamsulosin, 0.4 mg, Oral, Daily  thiamine, 200 mg, Oral, Daily  traZODone, 75 mg, Oral, Nightly  vitamin C, 1,000 mg, Oral, Daily  vitamin D3, 5,000 Units, Oral, Daily  zinc sulfate, 220 mg, Oral, BID      Infusions  Pharmacy Consult - Pharmacy to dose,       PRNs  •  acetaminophen **OR** acetaminophen **OR** acetaminophen  •  acetaminophen  •  benzonatate  •  bismuth subsalicylate  •  fleet enema  •  HYDROcodone-acetaminophen  •  lactulose  •  magnesium sulfate **OR** magnesium sulfate in D5W 1g/100mL (PREMIX)  •  melatonin  •  nitroglycerin  •  ondansetron **OR** ondansetron  •  Pharmacy Consult - Pharmacy to dose  •  polyethylene glycol  •  potassium chloride **OR** potassium chloride **OR** potassium chloride  •  sodium chloride  •  sodium chloride  •  sodium chloride  •  witch hazel-glycerin    Physical Exam:  Physical Exam  Cardiovascular:      Heart sounds: Murmur present.   Pulmonary:      Breath sounds: Rhonchi and rales present.         ROS  Review of Systems   Respiratory: Positive for cough and shortness of breath.    Cardiovascular: Positive for palpitations and leg swelling.             Total time spent with patient greater than: 45 Minutes

## 2020-12-03 NOTE — PROGRESS NOTES
[facility] Medicine Services Daily Progress Note      Hospitalist Team  LOS 1 days      Patient Care Team:  aB Loaiza MD as PCP - General (Geriatric Medicine)    Patient Location: 2125/1      Subjective   Subjective     Chief Complaint / Subjective  Chief Complaint   Patient presents with   • Shortness of Breath         Brief Synopsis of Hospital Course/HPI  HPI limited due to patient on Bipap mask, mostly taken from medical record review and ER documentation     Mr. Dowling is a 65 y.o. male with PMH of COPD on oxygen nocturnally, HTN, CAD, A.fib on Xarelto, CKD, CVA, GERD, anemia, and insomnia who presented to [facility] [place] [facility] [place] EMS of shortness of breath and hypoxia. He was reported to have O2 saturation 69% on room air. He was placed on 2L O2 via NC and had O2 saturation 74%. He was placed on non-rebreather by EMS. He reported some left sided chest pain, cough. Per ER he had no fever, nausea, vomiting, or diarrhea but reported constipation. The patient tested positive at his facility on 11/23/2020 per ER documentation. He normally wears O2 at night.      In the ER patient had CT PE protocol that showed likely subacute pulmonary embolus within the distal main pulmonary artery on the left extending into the interlobar artery of the left lower lobe. Findings throughout the lungs likely related to Covid pneumonia. Emphysema. ABG showed ph 7.39, CO2 43.2, O2 64, HCO3 26.5 on 100% non-rebreather. He was placed on Bipap. WBC normal, temp 99.2. Respiratory viral panel WITHOUT Covid was negative. Troponin negative x2. Blood cultures were drawn. He was given IV rocephin, zithromax, decadron, 324mg aspirin, started treatment lovenox. He was admitted to the PCU for further treatment.                Date::      12/2/2020  Patient had been high flow oxygen.  Short of breath    2/3/2020  Still on high flow nasal cannula with precision flow device  Denies any  "nausea or vomiting or abdominal pain or chest pain  Switching to Eliquis since difficult to control his PTT with heparin drip  Discussed with nursing staff      ROS  All other systems reviewed and negative    Objective   Objective      Vital Signs  Temp:  [96.5 °F (35.8 °C)-97.4 °F (36.3 °C)] 96.5 °F (35.8 °C)  Heart Rate:  [79-98] 98  Resp:  [16-30] 20  BP: (127-147)/(74-94) 143/90  Oxygen Therapy  SpO2: 92 %  Pulse Oximetry Type: Continuous  Device (Oxygen Therapy): humidified, heated, high-flow nasal cannula  $ High Flow Nasal Cannula Set-Up: yes  Flow (L/min): 25  Oxygen Concentration (%): 80  Flowsheet Rows      First Filed Value   Admission Height  180.3 cm (71\") Documented at 11/30/2020 2358   Admission Weight  104 kg (230 lb) Documented at 11/30/2020 2358        Intake & Output (last 3 days)       11/30 0701 - 12/01 0700 12/01 0701 - 12/02 0700 12/02 0701 - 12/03 0700 12/03 0701 - 12/04 0700    P.O.  0 240     Total Intake(mL/kg)  0 (0) 240 (2.7)     Urine (mL/kg/hr) 150 1200 (0.6) 650 (0.3) 350 (0.4)    Stool  0      Total Output 150 1200 650 350    Net -150 -1200 -410 -350            Stool Unmeasured Occurrence  1 x          Lines, Drains & Airways    Active LDAs     Name:   Placement date:   Placement time:   Site:   Days:    Peripheral IV 12/01/20 0039 Right Antecubital   12/01/20    0039    Antecubital   1                  Physical Exam:    Physical Exam  Vitals signs and nursing note reviewed.   Constitutional:       General: He is not in acute distress.     Appearance: Normal appearance. He is well-developed. He is not ill-appearing, toxic-appearing or diaphoretic.   HENT:      Head: Normocephalic and atraumatic.      Right Ear: Ear canal and external ear normal.      Left Ear: Ear canal and external ear normal.      Nose: Nose normal. No congestion or rhinorrhea.      Mouth/Throat:      Mouth: Mucous membranes are moist.      Pharynx: No oropharyngeal exudate.   Eyes:      General: No scleral " icterus.        Right eye: No discharge.         Left eye: No discharge.      Extraocular Movements: Extraocular movements intact.      Conjunctiva/sclera: Conjunctivae normal.      Pupils: Pupils are equal, round, and reactive to light.   Neck:      Musculoskeletal: Normal range of motion and neck supple. No neck rigidity or muscular tenderness.      Thyroid: No thyromegaly.      Vascular: No carotid bruit or JVD.      Trachea: No tracheal deviation.   Cardiovascular:      Rate and Rhythm: Normal rate and regular rhythm.      Pulses: Normal pulses.      Heart sounds: Normal heart sounds. No murmur. No friction rub. No gallop.    Pulmonary:      Effort: Accessory muscle usage and respiratory distress present.      Breath sounds: No stridor. Decreased breath sounds and rales present. No wheezing or rhonchi.   Chest:      Chest wall: No tenderness.   Abdominal:      General: Bowel sounds are normal. There is no distension.      Palpations: Abdomen is soft. There is no mass.      Tenderness: There is no abdominal tenderness. There is no guarding or rebound.      Hernia: No hernia is present.   Musculoskeletal: Normal range of motion.         General: No swelling, tenderness, deformity or signs of injury.      Right lower leg: No edema.      Left lower leg: No edema.   Lymphadenopathy:      Cervical: No cervical adenopathy.   Skin:     General: Skin is warm and dry.      Coloration: Skin is not jaundiced or pale.      Findings: No bruising, erythema or rash.   Neurological:      General: No focal deficit present.      Mental Status: He is alert and oriented to person, place, and time. Mental status is at baseline.      Cranial Nerves: No cranial nerve deficit.      Sensory: No sensory deficit.      Motor: No weakness or abnormal muscle tone.      Coordination: Coordination normal.   Psychiatric:         Mood and Affect: Mood normal.         Behavior: Behavior normal.         Thought Content: Thought content normal.            Judgment: Judgment normal.               Procedures:              Results Review:     I reviewed the patient's new clinical results.      Lab Results (last 24 hours)     Procedure Component Value Units Date/Time    aPTT [[id number]]  (Abnormal) Collected: 12/03/20 0519    Specimen: Blood Updated: 12/03/20 0631     .7 seconds     Comprehensive Metabolic Panel [[id number]]  (Abnormal) Collected: 12/03/20 0519    Specimen: Blood Updated: 12/03/20 0614     Glucose 144 mg/dL      BUN 41 mg/dL      Creatinine 1.02 mg/dL      Sodium 140 mmol/L      Potassium 4.7 mmol/L      Chloride 106 mmol/L      CO2 26.0 mmol/L      Calcium 8.4 mg/dL      Total Protein 6.1 g/dL      Albumin 3.00 g/dL      ALT (SGPT) 20 U/L      AST (SGOT) 21 U/L      Alkaline Phosphatase 95 U/L      Total Bilirubin 0.4 mg/dL      eGFR Non African Amer 73 mL/min/1.73      Globulin 3.1 gm/dL      A/G Ratio 1.0 g/dL      BUN/Creatinine Ratio 40.2     Anion Gap 8.0 mmol/L     Narrative:      GFR Normal >60  Chronic Kidney Disease <60  Kidney Failure <15      CK [[id number]]  (Normal) Collected: 12/03/20 0519    Specimen: Blood Updated: 12/03/20 0614     Creatine Kinase 59 U/L     C-reactive Protein [[id number]]  (Abnormal) Collected: 12/03/20 0519    Specimen: Blood Updated: 12/03/20 0614     C-Reactive Protein 6.97 mg/dL     Magnesium [[id number]]  (Normal) Collected: 12/03/20 0519    Specimen: Blood Updated: 12/03/20 0614     Magnesium 1.9 mg/dL     Ferritin [[id number]]  (Normal) Collected: 12/03/20 0519    Specimen: Blood Updated: 12/03/20 0611     Ferritin 348.50 ng/mL     Narrative:      Results may be falsely decreased if patient taking Biotin.      CBC & Differential [[id number]]  (Abnormal) Collected: 12/03/20 0519    Specimen: Blood Updated: 12/03/20 0541    Narrative:      The following orders were created for panel order CBC & Differential.  Procedure                               Abnormality         Status                     ---------                                -----------         ------                     CBC Auto Differential[[id number]]        Abnormal            Final result                 Please view results for these tests on the individual orders.    CBC Auto Differential [[id number]]  (Abnormal) Collected: 12/03/20 0519    Specimen: Blood Updated: 12/03/20 0541     WBC 5.10 10*3/mm3      RBC 3.70 10*6/mm3      Hemoglobin 11.5 g/dL      Hematocrit 35.6 %      MCV 96.1 fL      MCH 31.2 pg      MCHC 32.5 g/dL      RDW 15.0 %      RDW-SD 50.3 fl      MPV 8.4 fL      Platelets 205 10*3/mm3      Neutrophil % 88.6 %      Lymphocyte % 4.3 %      Monocyte % 7.0 %      Eosinophil % 0.0 %      Basophil % 0.1 %      Neutrophils, Absolute 4.50 10*3/mm3      Lymphocytes, Absolute 0.20 10*3/mm3      Monocytes, Absolute 0.40 10*3/mm3      Eosinophils, Absolute 0.00 10*3/mm3      Basophils, Absolute 0.00 10*3/mm3      nRBC 0.0 /100 WBC     Blood Culture - Blood, Arm, Right [[id number]] Collected: 12/01/20 0020    Specimen: Blood from Arm, Right Updated: 12/03/20 0030     Blood Culture No growth at 2 days    aPTT [[id number]]  (Abnormal) Collected: 12/02/20 2019    Specimen: Blood Updated: 12/02/20 2110     PTT 54.4 seconds         Hemoglobin A1C   Date Value Ref Range Status   12/01/2020 5.9 (H) 3.5 - 5.6 % Final     Results from last 7 days   Lab Units 12/01/20  1102 12/01/20  0020   INR  1.05 1.17*       Results from last 7 days   Lab Units 12/01/20  0027   PH, ARTERIAL pH units 7.396   PO2 ART mm Hg 64.2*   PCO2, ARTERIAL mm Hg 43.2   HCO3 ART mmol/L 26.5     No results found for: LIPASE  No results found for: CHOL, CHLPL, TRIG, HDL, LDL, LDLDIRECT    No results found for: INTRAOP, PREDX, FINALDX, COMDX    Microbiology Results (last 10 days)     Procedure Component Value - Date/Time    Respiratory Panel PCR w/COVID-19(SARS-CoV-2) ARNAV/PURNIMA/HOLLY/PAD/COR/MAD/GLADYS In-House, NP Swab in UTM/VTM, 3-4 HR TAT - Swab, Nasopharynx [[id number]]  (Abnormal) Collected:  12/02/20 0548    Lab Status: Final result Specimen: Swab from Nasopharynx Updated: 12/02/20 0659     ADENOVIRUS, PCR Not Detected     Coronavirus 229E Not Detected     Coronavirus HKU1 Not Detected     Coronavirus NL63 Not Detected     Coronavirus OC43 Not Detected     COVID19 Detected     Human Metapneumovirus Not Detected     Human Rhinovirus/Enterovirus Not Detected     Influenza A PCR Not Detected     Influenza B PCR Not Detected     Parainfluenza Virus 1 Not Detected     Parainfluenza Virus 2 Not Detected     Parainfluenza Virus 3 Not Detected     Parainfluenza Virus 4 Not Detected     RSV, PCR Not Detected     Bordetella pertussis pcr Not Detected     Bordetella parapertussis PCR Not Detected     Chlamydophila pneumoniae PCR Not Detected     Mycoplasma pneumo by PCR Not Detected    Narrative:      Fact sheet for providers: https://docs.[employer]/wp-content/uploads/PIE0739-9731-NF7.1-EUA-Provider-Fact-Sheet-3.pdf    Fact sheet for patients: https://docs.[employer]/wp-content/uploads/AXH9532-2117-PU7.1-EUA-Patient-Fact-Sheet-1.pdf    Respiratory Panel, PCR (WITHOUT COVID) - Swab, Nasopharynx [[id number]]  (Normal) Collected: 12/01/20 0507    Lab Status: Final result Specimen: Swab from Nasopharynx Updated: 12/01/20 0704     ADENOVIRUS, PCR Not Detected     Coronavirus 229E Not Detected     Coronavirus HKU1 Not Detected     Coronavirus NL63 Not Detected     Coronavirus OC43 Not Detected     Human Metapneumovirus Not Detected     Human Rhinovirus/Enterovirus Not Detected     Influenza B PCR Not Detected     Parainfluenza Virus 1 Not Detected     Parainfluenza Virus 2 Not Detected     Parainfluenza Virus 3 Not Detected     Parainfluenza Virus 4 Not Detected     Bordetella pertussis pcr Not Detected     Influenza A H1 2009 PCR Not Detected     Chlamydophila pneumoniae PCR Not Detected     Mycoplasma pneumo by PCR Not Detected     Influenza A PCR Not Detected     Influenza A H3 Not Detected     Influenza A H1  Not Detected     RSV, PCR Not Detected     Bordetella parapertussis PCR Not Detected    Narrative:      The coronavirus on the RVP is NOT COVID-19 and is NOT indicative of infection with COVID-19.     Blood Culture - Blood, Arm, Right [[id number]] Collected: 12/01/20 0020    Lab Status: Preliminary result Specimen: Blood from Arm, Right Updated: 12/03/20 0030     Blood Culture No growth at 2 days    Blood Culture - Blood, Arm, Left [[id number]]  (Abnormal) Collected: 12/01/20 0020    Lab Status: Final result Specimen: Blood from Arm, Left Updated: 12/02/20 1309     Blood Culture Staphylococcus, coagulase negative     Comment: Probable contaminant requires clinical correlation, susceptibility not performed unless requested by physician.          Isolated from Aerobic Bottle     Gram Stain Aerobic Bottle Gram positive cocci in clusters    Blood Culture ID, PCR - Blood, Arm, Left [[id number]]  (Abnormal) Collected: 12/01/20 0020    Lab Status: Final result Specimen: Blood from Arm, Left Updated: 12/01/20 2241     BCID, PCR Staphylococcus spp, not aureus. Identification by BCID PCR.          ECG/EMG Results (most recent)     Procedure Component Value Units Date/Time    ECG 12 Lead [[id number]]  (Abnormal) Resulted: 12/01/20 0256     Updated: 12/01/20 0256    ECG 12 Lead [[id number]] Collected: 12/01/20 0012     Updated: 12/01/20 1524     QT Interval 447 ms     Narrative:      HEART RATE= 91  bpm  RR Interval= 659  ms  MI Interval=   ms  P Horizontal Axis=   deg  P Front Axis=   deg  QRSD Interval= 93  ms  QT Interval= 447  ms  QRS Axis= -36  deg  T Wave Axis= -23  deg  - ABNORMAL ECG -  Atrial fibrillation  Inferior infarct, age indeterminate  Anterior infarct, old  Prolonged QT interval  No previous ECG available for comparison  Electronically Signed By: Ryne Portillo) 01-Dec-2020 15:22:22  Date and Time of Study: 2020-12-01 00:12:20                    Xr Chest 1 View    Result Date: 12/1/2020  Diffuse bilateral  infiltrates consistent with multifocal pneumonia. Electronically signed by:  Naveed Welch M.D.  12/1/2020 12:26 AM    Ct Chest Pulmonary Embolism    Result Date: 12/1/2020  1. Likely subacute pulmonary embolus within the distal main pulmonary artery on the left extending into the interlobar artery of the left lower lobe. 2. No evidence of thoracic aortic aneurysm or dissection. 3. Findings throughout the lungs likely related to a Covid pneumonia given the patient's clinical history. Follow-up to clearing recommended. Likely area of rounded atelectasis, left lower lobe. 4. Emphysema. These critical findings were discussed with JT Kimball Electronically signed by:  Costa Lou D.O.  12/1/2020 12:34 AM          Xrays, labs reviewed personally by physician.    Medication Review:   I have reviewed the patient's current medication list      Scheduled Meds  Acetylcysteine, 600 mg, Oral, BID  albuterol sulfate HFA, 2 puff, Inhalation, 4x Daily - RT  ammonium lactate, , Topical, Q12H  apixaban, 10 mg, Oral, BID    Followed by  [START ON 12/10/2020] apixaban, 5 mg, Oral, BID  atorvastatin, 40 mg, Oral, Daily  budesonide-formoterol, 2 puff, Inhalation, BID - RT  bumetanide, 1 mg, Oral, Daily  busPIRone, 15 mg, Oral, TID  cefepime, 2 g, Intravenous, Q8H  cetirizine, 10 mg, Oral, Nightly  cloNIDine, 0.05 mg, Oral, Q12H  cyclobenzaprine, 5 mg, Oral, TID  dexamethasone, 12 mg, Intravenous, Daily  dilTIAZem CD, 120 mg, Oral, Daily  docusate sodium, 100 mg, Oral, TID  doxycycline, 100 mg, Oral, Q12H  escitalopram, 5 mg, Oral, Daily  guaiFENesin, 600 mg, Oral, Q12H  lidocaine, 1 patch, Transdermal, Q24H  melatonin, 10 mg, Oral, Nightly  metoclopramide, 10 mg, Oral, TID AC  montelukast, 10 mg, Oral, Nightly  pantoprazole, 40 mg, Oral, Q AM  potassium chloride, 20 mEq, Oral, Daily With Lunch  remdesivir, 100 mg, Intravenous, Q24H  simethicone, 120 mg, Oral, TID  sodium chloride, 10 mL, Intravenous, Q12H  tamsulosin, 0.4 mg,  Oral, Daily  thiamine, 200 mg, Oral, Daily  traZODone, 75 mg, Oral, Nightly  vitamin C, 1,000 mg, Oral, Daily  vitamin D3, 5,000 Units, Oral, Daily  zinc sulfate, 220 mg, Oral, BID        Meds Infusions  Pharmacy Consult - Pharmacy to dose,         Meds PRN  •  acetaminophen **OR** acetaminophen **OR** acetaminophen  •  acetaminophen  •  benzonatate  •  bismuth subsalicylate  •  fleet enema  •  HYDROcodone-acetaminophen  •  lactulose  •  magnesium sulfate **OR** magnesium sulfate in D5W 1g/100mL (PREMIX)  •  melatonin  •  nitroglycerin  •  ondansetron **OR** ondansetron  •  Pharmacy Consult - Pharmacy to dose  •  polyethylene glycol  •  potassium chloride **OR** potassium chloride **OR** potassium chloride  •  sodium chloride  •  sodium chloride  •  sodium chloride  •  witch hazel-glycerin        Assessment/Plan   Assessment/Plan     Active Hospital Problems    Diagnosis  POA   • **Acute respiratory failure due to COVID-19 (CMS/[facility]) [U07.1, J96.00]  Yes   • Pneumonia of both lungs due to infectious organism [J18.9]  Unknown   • Subacute pulmonary embolism (CMS/[facility]) [I26.99]  Unknown   • Renal disease [N28.9]  Yes   • CAD (coronary artery disease) [I25.10]  Yes   • Hypertension [I10]  Yes   • Stroke (CMS/[facility]) [I63.9]  Yes   • Mood disorder (CMS/[facility]) [F39]  Yes   • GERD (gastroesophageal reflux disease) [K21.9]  Yes   • Afib (CMS/[facility]) [I48.91]  Yes   • Insomnia [G47.00]  Yes   • BPH (benign prostatic hyperplasia) [N40.0]  Yes      Resolved Hospital Problems   No resolved problems to display.       MEDICAL DECISION MAKING COMPLEXITY BY PROBLEM:        Acute respiratory failure due to Covid-19, Pneumonia due to Covid-19  -ABG: showed ph 7.39, CO2 43.2, O2 64, HCO3 26.5 on 100% non-rebreather  -currently on Bipap at 100%  -CT PE protocol: likely subacute pulmonary embolus within the distal main pulmonary artery on the left extending into the interlobar artery of the left lower lobe. Findings throughout the lungs likely  related to Covid pneumonia. Emphysema  -, ferritin normal, procalcitonin 0.39, d-dimer 0.62, WBC normal, temp 99.2  -given IV rocephin, zithromax, and decadron in the ER  -Covid positive on 11/23/20, consult pharmacy for possible remdesivir   -continue albuterol, decadron, vitamins, antibiotics for facility related pneumonia-consult pulmonary for further recommendations      Acute pulmonary embolus  -CT PE protocol as above  -Now on heparin drip  Switch to Eliquis.       CKD  -creatinine 1.31  -monitor BMP     COPD  -on oxygen at night at baseline  -tx as above     Chronic atrial fibrillation  -cont home cardizem     Hypertension  -cont home cardizem, clonidine, losartan, bumex     Previous CVA  -on chronic anticoagulation, ?No statin listed on MAR     Depression  -cont home buspar, lexapro     Chronic pain  -on norco (Verified by Rx by [facility])     Chronic constipation  -prn laxatives      BPH  -cont home flomax        VTE Prophylaxis - switch to heparin drip       VTE Prophylaxis -   Mechanical Order History:     None      Pharmalogical Order History:      Ordered     Dose Route Frequency Stop    12/01/20 0309  enoxaparin (LOVENOX) syringe 40 mg  Status:  Discontinued      40 mg SC Every 24 Hours 12/01/20 1041    12/01/20 1042  heparin 46000 units/250 mL (100 units/mL) in 0.45 % NaCl infusion  14.97 mL/hr      14.4 Units/kg/hr IV Titrated --    12/01/20 1042  heparin bolus from bag 4,200 Units      40 Units/kg IV Every 6 Hours PRN --    12/01/20 1042  heparin bolus from bag 8,300 Units      80 Units/kg IV Every 6 Hours PRN --    12/01/20 0238  enoxaparin (LOVENOX) syringe 100 mg  Status:  Discontinued      1 mg/kg SC Once 12/01/20 0239                  Code Status -   Code Status and Medical Interventions:   Ordered at: 12/01/20 0308     Code Status:    CPR     Medical Interventions (Level of Support Prior to Arrest):    Full       This patient has been examined wearing appropriate Personal Protective  Equipment and discussed with hospital infection control department. 12/03/20        Discharge Planning  nh        Electronically signed by Mono Estes MD, 12/03/20, 15:58 EST.  [facility] Hospitalist Team

## 2020-12-03 NOTE — PROGRESS NOTES
Continued Stay Note  [facility]     Patient Name: Gordon Dowling  MRN: [id number]  Today's Date: 12/3/2020    Admit Date: 12/1/2020    Discharge Plan     Row Name 12/03/20 1457       Plan    Plan  DC Plan: Return to [facility] - pending respiratory improvement. Okay to return per facility liaison and pt. No PASRR or pre-cert needed for return.    Plan Comments  SW informed pt A&O today per nursing. SW unable to enter room d/t COVID 19 + and informed pt difficult to understand via phone. RN assisted in confirming pt was agreeable to return to facility at d/c.     No physical contact with patient on this date.    DARIANA Sahu    Phone: [phone]  Cell: [phone]  Fax: [phone]  [email or web address]

## 2020-12-03 NOTE — PLAN OF CARE
Goal Outcome Evaluation:  Plan of Care Reviewed With: patient  Progress: no change   The patient expressed some frustration about how he hates being sick, patient was provided with emotional reassurance. The patient has had adequate urine output throughout the night and his vitals have been stable, will continue to monitor.

## 2020-12-03 NOTE — PROGRESS NOTES
Daily Progress Note        Acute respiratory failure due to COVID-19 (CMS/HCC)    Pneumonia of both lungs due to infectious organism    Subacute pulmonary embolism (CMS/HCC)    Renal disease    CAD (coronary artery disease)    Hypertension    Stroke (CMS/HCC)    Mood disorder (CMS/HCC)    GERD (gastroesophageal reflux disease)    Afib (CMS/HCC)    Insomnia    BPH (benign prostatic hyperplasia)      Assessment:  Acute hypoxic respiratory failure secondary to COVID-19 infection  CT scan possible subacute distal PE, nonobstructing patient is on Xarelto 20 mg as an outpatient for A. Fib  Acute COPD exacerbation  A. fib  History of CVA  CKD     Recommendations:  Remdesivir  Decadron  Empiric antibiotics doxycycline and cefepime, monitor mental status especially with cefepime and patient already on trazodone and clonidine  Diuresis currently on Bumex 1 mg daily, home medication  Anti-inflammatory agents will include vitamin C vitamin D zinc and Mucomyst p.o.  Continue anticoagulation with heparin drip may switch to Eliquis or Xarelto  Aspirin            LOS: 0 days     Subjective         Objective     Vital signs for last 24 hours:  Vitals:    12/02/20 1624 12/02/20 1657 12/02/20 2104 12/02/20 2108   BP:  144/93     BP Location:       Patient Position:       Pulse: 84 91 89 86   Resp: 18 (!) 30 22 20   Temp:  96.8 °F (36 °C)     TempSrc:  Oral     SpO2: 97% 94% 96%    Weight:       Height:           Intake/Output last 3 shifts:  I/O last 3 completed shifts:  In: 0   Out: 1350 [Urine:1350]  Intake/Output this shift:  No intake/output data recorded.      Radiology  Imaging Results (Last 24 Hours)     ** No results found for the last 24 hours. **          Labs:  Results from last 7 days   Lab Units 12/02/20  0804   WBC 10*3/mm3 5.80   HEMOGLOBIN g/dL 12.0*   HEMATOCRIT % 37.5   PLATELETS 10*3/mm3 197     Results from last 7 days   Lab Units 12/02/20  0804   SODIUM mmol/L 144   POTASSIUM mmol/L 4.3   CHLORIDE mmol/L 107   CO2  mmol/L 26.0   BUN mg/dL 40*   CREATININE mg/dL 1.12   CALCIUM mg/dL 8.8   BILIRUBIN mg/dL 0.5   ALK PHOS U/L 142*   ALT (SGPT) U/L 23   AST (SGOT) U/L 28   GLUCOSE mg/dL 135*     Results from last 7 days   Lab Units 12/01/20  0027   PH, ARTERIAL pH units 7.396   PO2 ART mm Hg 64.2*   PCO2, ARTERIAL mm Hg 43.2   HCO3 ART mmol/L 26.5     Results from last 7 days   Lab Units 12/02/20  0804 12/01/20  0020   ALBUMIN g/dL 3.30* 4.30     Results from last 7 days   Lab Units 12/02/20  0804 12/01/20  0453 12/01/20  0141   CK TOTAL U/L 87  --   --    TROPONIN T ng/mL  --  <0.010 0.011         Results from last 7 days   Lab Units 12/02/20  0804   MAGNESIUM mg/dL 2.0     Results from last 7 days   Lab Units 12/02/20  2019 12/02/20  0804 12/01/20  2335 12/01/20  1102 12/01/20  0020   INR   --   --   --  1.05 1.17*   APTT seconds 54.4* >139.0* >139.0* 31.9* 34.2*               Meds:   SCHEDULE  Acetylcysteine, 600 mg, Oral, BID  albuterol sulfate HFA, 2 puff, Inhalation, 4x Daily - RT  ammonium lactate, , Topical, Q12H  atorvastatin, 40 mg, Oral, Daily  budesonide-formoterol, 2 puff, Inhalation, BID - RT  bumetanide, 1 mg, Oral, Daily  busPIRone, 15 mg, Oral, TID  cefepime, 2 g, Intravenous, Q8H  cetirizine, 10 mg, Oral, Nightly  cloNIDine, 0.05 mg, Oral, Q12H  cyclobenzaprine, 5 mg, Oral, TID  dexamethasone, 12 mg, Intravenous, Daily  dilTIAZem CD, 120 mg, Oral, Daily  docusate sodium, 100 mg, Oral, TID  doxycycline, 100 mg, Oral, Q12H  escitalopram, 5 mg, Oral, Daily  guaiFENesin, 600 mg, Oral, Q12H  lidocaine, 1 patch, Transdermal, Q24H  melatonin, 10 mg, Oral, Nightly  metoclopramide, 10 mg, Oral, TID AC  montelukast, 10 mg, Oral, Nightly  pantoprazole, 40 mg, Oral, Q AM  potassium chloride, 20 mEq, Oral, Daily With Lunch  remdesivir, 100 mg, Intravenous, Q24H  simethicone, 120 mg, Oral, TID  sodium chloride, 10 mL, Intravenous, Q12H  tamsulosin, 0.4 mg, Oral, Daily  thiamine, 200 mg, Oral, Daily  traZODone, 75 mg, Oral,  Nightly  vitamin C, 1,000 mg, Oral, Daily  vitamin D3, 5,000 Units, Oral, Daily  zinc sulfate, 220 mg, Oral, BID      Infusions  heparin, 14.4 Units/kg/hr, Last Rate: 14.4 Units/kg/hr (12/02/20 2128)  Pharmacy Consult - Pharmacy to dose,       PRNs  •  acetaminophen **OR** acetaminophen **OR** acetaminophen  •  acetaminophen  •  benzonatate  •  bismuth subsalicylate  •  fleet enema  •  heparin  •  heparin  •  HYDROcodone-acetaminophen  •  lactulose  •  magnesium sulfate **OR** magnesium sulfate in D5W 1g/100mL (PREMIX)  •  melatonin  •  nitroglycerin  •  ondansetron **OR** ondansetron  •  Pharmacy Consult - Pharmacy to dose  •  polyethylene glycol  •  potassium chloride **OR** potassium chloride **OR** potassium chloride  •  sodium chloride  •  sodium chloride  •  sodium chloride  •  witch hazel-glycerin    Physical Exam:  Physical Exam  Cardiovascular:      Heart sounds: Murmur present.   Pulmonary:      Breath sounds: Rhonchi and rales present.         ROS  Review of Systems   Respiratory: Positive for cough and shortness of breath.    Cardiovascular: Positive for palpitations and leg swelling.             Total time spent with patient greater than: 45 Minutes

## 2020-12-04 LAB
ALBUMIN SERPL-MCNC: 3 G/DL (ref 3.5–5.2)
ALBUMIN/GLOB SERPL: 1 G/DL
ALP SERPL-CCNC: 84 U/L (ref 39–117)
ALT SERPL W P-5'-P-CCNC: 24 U/L (ref 1–41)
ANION GAP SERPL CALCULATED.3IONS-SCNC: 6 MMOL/L (ref 5–15)
AST SERPL-CCNC: 23 U/L (ref 1–40)
BASOPHILS # BLD AUTO: 0 10*3/MM3 (ref 0–0.2)
BASOPHILS NFR BLD AUTO: 0.1 % (ref 0–1.5)
BILIRUB SERPL-MCNC: 0.4 MG/DL (ref 0–1.2)
BUN SERPL-MCNC: 37 MG/DL (ref 8–23)
BUN/CREAT SERPL: 39.4 (ref 7–25)
CALCIUM SPEC-SCNC: 8.5 MG/DL (ref 8.6–10.5)
CHLORIDE SERPL-SCNC: 108 MMOL/L (ref 98–107)
CK SERPL-CCNC: 52 U/L (ref 20–200)
CO2 SERPL-SCNC: 26 MMOL/L (ref 22–29)
CREAT SERPL-MCNC: 0.94 MG/DL (ref 0.76–1.27)
CRP SERPL-MCNC: 3.88 MG/DL (ref 0–0.5)
D DIMER PPP FEU-MCNC: 1.2 MG/L (FEU) (ref 0–0.59)
DEPRECATED RDW RBC AUTO: 51.2 FL (ref 37–54)
EOSINOPHIL # BLD AUTO: 0 10*3/MM3 (ref 0–0.4)
EOSINOPHIL NFR BLD AUTO: 0 % (ref 0.3–6.2)
ERYTHROCYTE [DISTWIDTH] IN BLOOD BY AUTOMATED COUNT: 15.2 % (ref 12.3–15.4)
FERRITIN SERPL-MCNC: 285.2 NG/ML (ref 30–400)
FIBRINOGEN PPP-MCNC: 464 MG/DL (ref 210–450)
GFR SERPL CREATININE-BSD FRML MDRD: 80 ML/MIN/1.73
GLOBULIN UR ELPH-MCNC: 2.9 GM/DL
GLUCOSE SERPL-MCNC: 154 MG/DL (ref 65–99)
HCT VFR BLD AUTO: 35.1 % (ref 37.5–51)
HGB BLD-MCNC: 11.1 G/DL (ref 13–17.7)
LDH SERPL-CCNC: 401 U/L (ref 135–225)
LYMPHOCYTES # BLD AUTO: 0.2 10*3/MM3 (ref 0.7–3.1)
LYMPHOCYTES NFR BLD AUTO: 4.4 % (ref 19.6–45.3)
MAGNESIUM SERPL-MCNC: 2 MG/DL (ref 1.6–2.4)
MCH RBC QN AUTO: 30.5 PG (ref 26.6–33)
MCHC RBC AUTO-ENTMCNC: 31.7 G/DL (ref 31.5–35.7)
MCV RBC AUTO: 96.3 FL (ref 79–97)
MONOCYTES # BLD AUTO: 0.4 10*3/MM3 (ref 0.1–0.9)
MONOCYTES NFR BLD AUTO: 7.5 % (ref 5–12)
NEUTROPHILS NFR BLD AUTO: 4.8 10*3/MM3 (ref 1.7–7)
NEUTROPHILS NFR BLD AUTO: 88 % (ref 42.7–76)
NRBC BLD AUTO-RTO: 0.1 /100 WBC (ref 0–0.2)
PLATELET # BLD AUTO: 227 10*3/MM3 (ref 140–450)
PMV BLD AUTO: 8.2 FL (ref 6–12)
POTASSIUM SERPL-SCNC: 5.1 MMOL/L (ref 3.5–5.2)
PROT SERPL-MCNC: 5.9 G/DL (ref 6–8.5)
RBC # BLD AUTO: 3.65 10*6/MM3 (ref 4.14–5.8)
SODIUM SERPL-SCNC: 140 MMOL/L (ref 136–145)
TROPONIN T SERPL-MCNC: <0.01 NG/ML (ref 0–0.03)
WBC # BLD AUTO: 5.5 10*3/MM3 (ref 3.4–10.8)

## 2020-12-04 PROCEDURE — 94799 UNLISTED PULMONARY SVC/PX: CPT

## 2020-12-04 PROCEDURE — 94660 CPAP INITIATION&MGMT: CPT

## 2020-12-04 PROCEDURE — 83615 LACTATE (LD) (LDH) ENZYME: CPT | Performed by: STUDENT IN AN ORGANIZED HEALTH CARE EDUCATION/TRAINING PROGRAM

## 2020-12-04 PROCEDURE — 85379 FIBRIN DEGRADATION QUANT: CPT | Performed by: STUDENT IN AN ORGANIZED HEALTH CARE EDUCATION/TRAINING PROGRAM

## 2020-12-04 PROCEDURE — 99233 SBSQ HOSP IP/OBS HIGH 50: CPT | Performed by: INTERNAL MEDICINE

## 2020-12-04 PROCEDURE — 25010000002 DEXAMETHASONE SODIUM PHOSPHATE 20 MG/5ML SOLUTION 5 ML VIAL: Performed by: INTERNAL MEDICINE

## 2020-12-04 PROCEDURE — 85384 FIBRINOGEN ACTIVITY: CPT | Performed by: STUDENT IN AN ORGANIZED HEALTH CARE EDUCATION/TRAINING PROGRAM

## 2020-12-04 PROCEDURE — 82550 ASSAY OF CK (CPK): CPT | Performed by: STUDENT IN AN ORGANIZED HEALTH CARE EDUCATION/TRAINING PROGRAM

## 2020-12-04 PROCEDURE — 82728 ASSAY OF FERRITIN: CPT | Performed by: STUDENT IN AN ORGANIZED HEALTH CARE EDUCATION/TRAINING PROGRAM

## 2020-12-04 PROCEDURE — 93005 ELECTROCARDIOGRAM TRACING: CPT | Performed by: INTERNAL MEDICINE

## 2020-12-04 PROCEDURE — 83735 ASSAY OF MAGNESIUM: CPT | Performed by: STUDENT IN AN ORGANIZED HEALTH CARE EDUCATION/TRAINING PROGRAM

## 2020-12-04 PROCEDURE — 94760 N-INVAS EAR/PLS OXIMETRY 1: CPT

## 2020-12-04 PROCEDURE — 80053 COMPREHEN METABOLIC PANEL: CPT | Performed by: STUDENT IN AN ORGANIZED HEALTH CARE EDUCATION/TRAINING PROGRAM

## 2020-12-04 PROCEDURE — 85025 COMPLETE CBC W/AUTO DIFF WBC: CPT | Performed by: STUDENT IN AN ORGANIZED HEALTH CARE EDUCATION/TRAINING PROGRAM

## 2020-12-04 PROCEDURE — 93010 ELECTROCARDIOGRAM REPORT: CPT | Performed by: INTERNAL MEDICINE

## 2020-12-04 PROCEDURE — 84484 ASSAY OF TROPONIN QUANT: CPT | Performed by: INTERNAL MEDICINE

## 2020-12-04 PROCEDURE — 25010000002 CEFEPIME PER 500 MG: Performed by: NURSE PRACTITIONER

## 2020-12-04 PROCEDURE — 86140 C-REACTIVE PROTEIN: CPT | Performed by: STUDENT IN AN ORGANIZED HEALTH CARE EDUCATION/TRAINING PROGRAM

## 2020-12-04 RX ORDER — DEXAMETHASONE SODIUM PHOSPHATE 4 MG/ML
6 INJECTION, SOLUTION INTRA-ARTICULAR; INTRALESIONAL; INTRAMUSCULAR; INTRAVENOUS; SOFT TISSUE DAILY
Status: DISCONTINUED | OUTPATIENT
Start: 2020-12-05 | End: 2020-12-11 | Stop reason: HOSPADM

## 2020-12-04 RX ORDER — HYDROXYZINE HYDROCHLORIDE 25 MG/1
25 TABLET, FILM COATED ORAL ONCE
Status: COMPLETED | OUTPATIENT
Start: 2020-12-04 | End: 2020-12-04

## 2020-12-04 RX ADMIN — DOXYCYCLINE 100 MG: 100 TABLET, FILM COATED ORAL at 20:33

## 2020-12-04 RX ADMIN — BUSPIRONE HYDROCHLORIDE 15 MG: 10 TABLET ORAL at 20:35

## 2020-12-04 RX ADMIN — OXYCODONE HYDROCHLORIDE AND ACETAMINOPHEN 1000 MG: 500 TABLET ORAL at 08:22

## 2020-12-04 RX ADMIN — Medication 10 ML: at 20:35

## 2020-12-04 RX ADMIN — CEFEPIME HYDROCHLORIDE 2 G: 2 INJECTION, POWDER, FOR SOLUTION INTRAVENOUS at 05:04

## 2020-12-04 RX ADMIN — APIXABAN 10 MG: 5 TABLET, FILM COATED ORAL at 20:33

## 2020-12-04 RX ADMIN — SIMETHICONE 120 MG: 80 TABLET, CHEWABLE ORAL at 08:24

## 2020-12-04 RX ADMIN — DEXAMETHASONE SODIUM PHOSPHATE 12 MG: 4 INJECTION, SOLUTION INTRAMUSCULAR; INTRAVENOUS at 08:25

## 2020-12-04 RX ADMIN — METOCLOPRAMIDE 10 MG: 10 TABLET ORAL at 11:15

## 2020-12-04 RX ADMIN — ALBUTEROL SULFATE 2 PUFF: 108 AEROSOL, METERED RESPIRATORY (INHALATION) at 07:53

## 2020-12-04 RX ADMIN — SODIUM CHLORIDE 100 MG: 9 INJECTION, SOLUTION INTRAVENOUS at 14:19

## 2020-12-04 RX ADMIN — ZINC SULFATE 220 MG (50 MG) CAPSULE 220 MG: CAPSULE at 08:22

## 2020-12-04 RX ADMIN — BUMETANIDE 1 MG: 1 TABLET ORAL at 08:24

## 2020-12-04 RX ADMIN — CYCLOBENZAPRINE HYDROCHLORIDE 5 MG: 10 TABLET, FILM COATED ORAL at 08:22

## 2020-12-04 RX ADMIN — BUDESONIDE AND FORMOTEROL FUMARATE DIHYDRATE 2 PUFF: 160; 4.5 AEROSOL RESPIRATORY (INHALATION) at 07:53

## 2020-12-04 RX ADMIN — Medication 1 APPLICATION: at 08:21

## 2020-12-04 RX ADMIN — BUSPIRONE HYDROCHLORIDE 15 MG: 10 TABLET ORAL at 08:23

## 2020-12-04 RX ADMIN — Medication: at 20:35

## 2020-12-04 RX ADMIN — CLONIDINE HYDROCHLORIDE 0.05 MG: 0.1 TABLET ORAL at 08:23

## 2020-12-04 RX ADMIN — HYDROXYZINE HYDROCHLORIDE 25 MG: 25 TABLET, FILM COATED ORAL at 07:33

## 2020-12-04 RX ADMIN — SIMETHICONE 120 MG: 80 TABLET, CHEWABLE ORAL at 20:34

## 2020-12-04 RX ADMIN — MONTELUKAST 10 MG: 10 TABLET, FILM COATED ORAL at 20:33

## 2020-12-04 RX ADMIN — Medication 600 MG: at 08:24

## 2020-12-04 RX ADMIN — ALBUTEROL SULFATE 2 PUFF: 108 AEROSOL, METERED RESPIRATORY (INHALATION) at 11:43

## 2020-12-04 RX ADMIN — LIDOCAINE 1 PATCH: 50 PATCH TOPICAL at 08:24

## 2020-12-04 RX ADMIN — ZINC SULFATE 220 MG (50 MG) CAPSULE 220 MG: CAPSULE at 20:35

## 2020-12-04 RX ADMIN — DOCUSATE SODIUM 100 MG: 100 CAPSULE, LIQUID FILLED ORAL at 15:35

## 2020-12-04 RX ADMIN — BUSPIRONE HYDROCHLORIDE 15 MG: 10 TABLET ORAL at 15:35

## 2020-12-04 RX ADMIN — SIMETHICONE 120 MG: 80 TABLET, CHEWABLE ORAL at 15:35

## 2020-12-04 RX ADMIN — CYCLOBENZAPRINE HYDROCHLORIDE 5 MG: 10 TABLET, FILM COATED ORAL at 20:34

## 2020-12-04 RX ADMIN — DOXYCYCLINE 100 MG: 100 TABLET, FILM COATED ORAL at 08:24

## 2020-12-04 RX ADMIN — Medication 10 MG: at 20:33

## 2020-12-04 RX ADMIN — CLONIDINE HYDROCHLORIDE 0.05 MG: 0.1 TABLET ORAL at 20:34

## 2020-12-04 RX ADMIN — CEFEPIME HYDROCHLORIDE 2 G: 2 INJECTION, POWDER, FOR SOLUTION INTRAVENOUS at 12:35

## 2020-12-04 RX ADMIN — POLYETHYLENE GLYCOL 3350 17 G: 17 POWDER, FOR SOLUTION ORAL at 05:04

## 2020-12-04 RX ADMIN — PANTOPRAZOLE SODIUM 40 MG: 40 TABLET, DELAYED RELEASE ORAL at 05:04

## 2020-12-04 RX ADMIN — Medication 200 MG: at 08:22

## 2020-12-04 RX ADMIN — BENZONATATE 100 MG: 100 CAPSULE ORAL at 05:04

## 2020-12-04 RX ADMIN — APIXABAN 10 MG: 5 TABLET, FILM COATED ORAL at 08:24

## 2020-12-04 RX ADMIN — Medication 10 ML: at 08:25

## 2020-12-04 RX ADMIN — GUAIFENESIN 600 MG: 600 TABLET, EXTENDED RELEASE ORAL at 08:22

## 2020-12-04 RX ADMIN — HYDROCODONE BITARTRATE AND ACETAMINOPHEN 1 TABLET: 5; 325 TABLET ORAL at 12:37

## 2020-12-04 RX ADMIN — HYDROCODONE BITARTRATE AND ACETAMINOPHEN 1 TABLET: 5; 325 TABLET ORAL at 20:34

## 2020-12-04 RX ADMIN — CETIRIZINE HYDROCHLORIDE 10 MG: 10 TABLET, FILM COATED ORAL at 20:32

## 2020-12-04 RX ADMIN — Medication 600 MG: at 20:33

## 2020-12-04 RX ADMIN — GUAIFENESIN 600 MG: 600 TABLET, EXTENDED RELEASE ORAL at 20:33

## 2020-12-04 RX ADMIN — BUDESONIDE AND FORMOTEROL FUMARATE DIHYDRATE 2 PUFF: 160; 4.5 AEROSOL RESPIRATORY (INHALATION) at 19:40

## 2020-12-04 RX ADMIN — DOCUSATE SODIUM 100 MG: 100 CAPSULE, LIQUID FILLED ORAL at 08:22

## 2020-12-04 RX ADMIN — CYCLOBENZAPRINE HYDROCHLORIDE 5 MG: 10 TABLET, FILM COATED ORAL at 15:35

## 2020-12-04 RX ADMIN — DOCUSATE SODIUM 100 MG: 100 CAPSULE, LIQUID FILLED ORAL at 20:33

## 2020-12-04 RX ADMIN — ALBUTEROL SULFATE 2 PUFF: 108 AEROSOL, METERED RESPIRATORY (INHALATION) at 19:40

## 2020-12-04 RX ADMIN — Medication 5000 UNITS: at 08:24

## 2020-12-04 RX ADMIN — ATORVASTATIN CALCIUM 40 MG: 40 TABLET, FILM COATED ORAL at 08:22

## 2020-12-04 RX ADMIN — TAMSULOSIN HYDROCHLORIDE 0.4 MG: 0.4 CAPSULE ORAL at 08:22

## 2020-12-04 RX ADMIN — HYDROCODONE BITARTRATE AND ACETAMINOPHEN 1 TABLET: 5; 325 TABLET ORAL at 05:04

## 2020-12-04 RX ADMIN — TRAZODONE HYDROCHLORIDE 75 MG: 50 TABLET ORAL at 20:32

## 2020-12-04 RX ADMIN — ALBUTEROL SULFATE 2 PUFF: 108 AEROSOL, METERED RESPIRATORY (INHALATION) at 16:09

## 2020-12-04 RX ADMIN — METOCLOPRAMIDE 10 MG: 10 TABLET ORAL at 16:58

## 2020-12-04 RX ADMIN — METOCLOPRAMIDE 10 MG: 10 TABLET ORAL at 07:33

## 2020-12-04 RX ADMIN — ESCITALOPRAM OXALATE 5 MG: 10 TABLET ORAL at 08:24

## 2020-12-04 RX ADMIN — DILTIAZEM HYDROCHLORIDE 120 MG: 120 CAPSULE, COATED, EXTENDED RELEASE ORAL at 08:22

## 2020-12-04 NOTE — PROGRESS NOTES
Continued Stay Note  [facility]     Patient Name: Gordon Dowling  MRN: [id number]  Today's Date: 12/4/2020    Admit Date: 12/1/2020    Discharge Plan     Row Name 12/04/20 1646       Plan    Plan  DC Plan: [place] [facility] [place] improvement. Okay to return per facility/pt. No PASRR or pre-cert needed.    Plan Comments  DC Barrier: COVID positive. Requiring 25L 80% precision flow              Alexandra Coleman RN

## 2020-12-04 NOTE — PROGRESS NOTES
Daily Progress Note        Acute respiratory failure due to COVID-19 (CMS/HCC)    Pneumonia of both lungs due to infectious organism    Subacute pulmonary embolism (CMS/HCC)    Renal disease    CAD (coronary artery disease)    Hypertension    Stroke (CMS/HCC)    Mood disorder (CMS/HCC)    GERD (gastroesophageal reflux disease)    Afib (CMS/HCC)    Insomnia    BPH (benign prostatic hyperplasia)      Assessment:  Acute hypoxic respiratory failure secondary to COVID-19 infection  CT scan possible subacute distal PE, nonobstructing patient is on Xarelto 20 mg as an outpatient for A. Fib  Acute COPD exacerbation  A. fib  History of CVA  CKD     Recommendations:  Currently on precision flow 25 L and 80%  Remdesivir  Decadron  Empiric antibiotics doxycycline   Diuresis currently on Bumex 1 mg daily, home medication  Anti-inflammatory agents will include vitamin C vitamin D zinc and Mucomyst p.o.  Continue anticoagulation with heparin drip may switch to Eliquis or Xarelto  Aspirin            LOS: 2 days     Subjective         Objective     Vital signs for last 24 hours:  Vitals:    12/04/20 1459 12/04/20 1609 12/04/20 1612 12/04/20 1718   BP: 155/85   132/82   Pulse: 76 77  85   Resp: 20 20 20 25   Temp: 99.1 °F (37.3 °C)   97.3 °F (36.3 °C)   TempSrc: Oral   Oral   SpO2: 95%   99%   Weight:       Height:           Intake/Output last 3 shifts:  I/O last 3 completed shifts:  In: 300 [P.O.:300]  Out: 1675 [Urine:1675]  Intake/Output this shift:  I/O this shift:  In: 240 [P.O.:240]  Out: 1850 [Urine:1850]      Radiology  Imaging Results (Last 24 Hours)     ** No results found for the last 24 hours. **          Labs:  Results from last 7 days   Lab Units 12/04/20  0240   WBC 10*3/mm3 5.50   HEMOGLOBIN g/dL 11.1*   HEMATOCRIT % 35.1*   PLATELETS 10*3/mm3 227     Results from last 7 days   Lab Units 12/04/20  0240   SODIUM mmol/L 140   POTASSIUM mmol/L 5.1   CHLORIDE mmol/L 108*   CO2 mmol/L 26.0   BUN mg/dL 37*   CREATININE  mg/dL 0.94   CALCIUM mg/dL 8.5*   BILIRUBIN mg/dL 0.4   ALK PHOS U/L 84   ALT (SGPT) U/L 24   AST (SGOT) U/L 23   GLUCOSE mg/dL 154*     Results from last 7 days   Lab Units 12/01/20  0027   PH, ARTERIAL pH units 7.396   PO2 ART mm Hg 64.2*   PCO2, ARTERIAL mm Hg 43.2   HCO3 ART mmol/L 26.5     Results from last 7 days   Lab Units 12/04/20  0240 12/03/20  0519 12/02/20  0804   ALBUMIN g/dL 3.00* 3.00* 3.30*     Results from last 7 days   Lab Units 12/04/20  0525 12/04/20  0240 12/03/20  0519 12/02/20  0804 12/01/20  0453 12/01/20  0141   CK TOTAL U/L  --  52 59 87  --   --    TROPONIN T ng/mL <0.010  --   --   --  <0.010 0.011         Results from last 7 days   Lab Units 12/04/20  0240   MAGNESIUM mg/dL 2.0     Results from last 7 days   Lab Units 12/03/20  0519 12/02/20  2019 12/02/20  0804  12/01/20  1102 12/01/20  0020   INR   --   --   --   --  1.05 1.17*   APTT seconds 105.7* 54.4* >139.0*   < > 31.9* 34.2*    < > = values in this interval not displayed.               Meds:   SCHEDULE  Acetylcysteine, 600 mg, Oral, BID  albuterol sulfate HFA, 2 puff, Inhalation, 4x Daily - RT  ammonium lactate, , Topical, Q12H  apixaban, 10 mg, Oral, BID    Followed by  [START ON 12/10/2020] apixaban, 5 mg, Oral, BID  atorvastatin, 40 mg, Oral, Daily  budesonide-formoterol, 2 puff, Inhalation, BID - RT  bumetanide, 1 mg, Oral, Daily  busPIRone, 15 mg, Oral, TID  cetirizine, 10 mg, Oral, Nightly  cloNIDine, 0.05 mg, Oral, Q12H  cyclobenzaprine, 5 mg, Oral, TID  [START ON 12/5/2020] dexamethasone, 6 mg, Intravenous, Daily  dilTIAZem CD, 120 mg, Oral, Daily  docusate sodium, 100 mg, Oral, TID  doxycycline, 100 mg, Oral, Q12H  escitalopram, 5 mg, Oral, Daily  guaiFENesin, 600 mg, Oral, Q12H  lidocaine, 1 patch, Transdermal, Q24H  melatonin, 10 mg, Oral, Nightly  metoclopramide, 10 mg, Oral, TID AC  montelukast, 10 mg, Oral, Nightly  pantoprazole, 40 mg, Oral, Q AM  potassium chloride, 20 mEq, Oral, Daily With Lunch  remdesivir, 100  mg, Intravenous, Q24H  simethicone, 120 mg, Oral, TID  sodium chloride, 10 mL, Intravenous, Q12H  tamsulosin, 0.4 mg, Oral, Daily  thiamine, 200 mg, Oral, Daily  traZODone, 75 mg, Oral, Nightly  vitamin C, 1,000 mg, Oral, Daily  vitamin D3, 5,000 Units, Oral, Daily  zinc sulfate, 220 mg, Oral, BID      Infusions  Pharmacy Consult - Pharmacy to dose,       PRNs  •  acetaminophen **OR** acetaminophen **OR** acetaminophen  •  acetaminophen  •  benzonatate  •  bismuth subsalicylate  •  fleet enema  •  HYDROcodone-acetaminophen  •  lactulose  •  magnesium sulfate **OR** magnesium sulfate in D5W 1g/100mL (PREMIX)  •  melatonin  •  nitroglycerin  •  ondansetron **OR** ondansetron  •  Pharmacy Consult - Pharmacy to dose  •  polyethylene glycol  •  potassium chloride **OR** potassium chloride **OR** potassium chloride  •  sodium chloride  •  sodium chloride  •  sodium chloride  •  witch hazel-glycerin    Physical Exam:  Physical Exam  Cardiovascular:      Heart sounds: Murmur present.   Pulmonary:      Breath sounds: Rhonchi and rales present.         ROS  Review of Systems   Respiratory: Positive for cough and shortness of breath.    Cardiovascular: Positive for palpitations and leg swelling.             Total time spent with patient greater than: 45 Minutes

## 2020-12-04 NOTE — PROGRESS NOTES
[facility] Medicine Services Daily Progress Note      Hospitalist Team  LOS 2 days      Patient Care Team:  Ba Loaiza MD as PCP - General (Geriatric Medicine)    Patient Location: 2125/1      Subjective   Subjective     Chief Complaint / Subjective  Chief Complaint   Patient presents with   • Shortness of Breath         Brief Synopsis of Hospital Course/HPI  HPI limited due to patient on Bipap mask, mostly taken from medical record review and ER documentation     Mr. Dowling is a 65 y.o. male with PMH of COPD on oxygen nocturnally, HTN, CAD, A.fib on Xarelto, CKD, CVA, GERD, anemia, and insomnia who presented to [facility] ER 12/1/20 from [facility] with report per EMS of shortness of breath and hypoxia. He was reported to have O2 saturation 69% on room air. He was placed on 2L O2 via NC and had O2 saturation 74%. He was placed on non-rebreather by EMS. He reported some left sided chest pain, cough. Per ER he had no fever, nausea, vomiting, or diarrhea but reported constipation. The patient tested positive at his facility on 11/23/2020 per ER documentation. He normally wears O2 at night.      In the ER patient had CT PE protocol that showed likely subacute pulmonary embolus within the distal main pulmonary artery on the left extending into the interlobar artery of the left lower lobe. Findings throughout the lungs likely related to Covid pneumonia. Emphysema. ABG showed ph 7.39, CO2 43.2, O2 64, HCO3 26.5 on 100% non-rebreather. He was placed on Bipap. WBC normal, temp 99.2. Respiratory viral panel WITHOUT Covid was negative. Troponin negative x2. Blood cultures were drawn. He was given IV rocephin, zithromax, decadron, 324mg aspirin, started treatment lovenox. He was admitted to the PCU for further treatment.                Date::      12/2/2020  Patient had been high flow oxygen.  Short of breath    12/3/2020  Still on high flow nasal cannula with precision flow device  Denies any  "nausea or vomiting or abdominal pain or chest pain  Switching to Eliquis since difficult to control his PTT with heparin drip  Discussed with nursing staff    12/4  Patient has persistent cough  Medications adjusted to help her symptoms  Events overnight reviewed  Decrease steroids  We will discuss with pulmonologist        ROS  All other systems reviewed and negative    Objective   Objective      Vital Signs  Temp:  [96.5 °F (35.8 °C)-98 °F (36.7 °C)] 97.5 °F (36.4 °C)  Heart Rate:  [79-98] 91  Resp:  [18-35] 28  BP: (132-144)/(73-95) 142/95  Oxygen Therapy  SpO2: 95 %  Pulse Oximetry Type: Continuous  Device (Oxygen Therapy): heated, high-flow nasal cannula, humidified  $ High Flow Nasal Cannula Set-Up: yes  Flow (L/min): 25  Oxygen Concentration (%): 80  Flowsheet Rows      First Filed Value   Admission Height  180.3 cm (71\") Documented at 11/30/2020 2358   Admission Weight  104 kg (230 lb) Documented at 11/30/2020 2358        Intake & Output (last 3 days)       12/01 0701 - 12/02 0700 12/02 0701 - 12/03 0700 12/03 0701 - 12/04 0700 12/04 0701 - 12/05 0700    P.O. 0 240 300 240    Total Intake(mL/kg) 0 (0) 240 (2.7) 300 (3.4) 240 (2.7)    Urine (mL/kg/hr) 1200 (0.6) 650 (0.3) 1175 (0.6) 1200 (2.5)    Stool 0       Total Output 7578 624 2997 1200    Net -1200 -410 -875 -960            Stool Unmeasured Occurrence 1 x           Lines, Drains & Airways    Active LDAs     Name:   Placement date:   Placement time:   Site:   Days:    Peripheral IV 12/01/20 0039 Right Antecubital   12/01/20 0039    Antecubital   1                  Physical Exam:    Physical Exam  Vitals signs and nursing note reviewed.   Constitutional:       General: He is not in acute distress.     Appearance: Normal appearance. He is well-developed. He is not ill-appearing, toxic-appearing or diaphoretic.   HENT:      Head: Normocephalic and atraumatic.      Right Ear: Ear canal and external ear normal.      Left Ear: Ear canal and external ear " normal.      Nose: Nose normal. No congestion or rhinorrhea.      Mouth/Throat:      Mouth: Mucous membranes are moist.      Pharynx: No oropharyngeal exudate.   Eyes:      General: No scleral icterus.        Right eye: No discharge.         Left eye: No discharge.      Extraocular Movements: Extraocular movements intact.      Conjunctiva/sclera: Conjunctivae normal.      Pupils: Pupils are equal, round, and reactive to light.   Neck:      Musculoskeletal: Normal range of motion and neck supple. No neck rigidity or muscular tenderness.      Thyroid: No thyromegaly.      Vascular: No carotid bruit or JVD.      Trachea: No tracheal deviation.   Cardiovascular:      Rate and Rhythm: Normal rate and regular rhythm.      Pulses: Normal pulses.      Heart sounds: Normal heart sounds. No murmur. No friction rub. No gallop.    Pulmonary:      Effort: Accessory muscle usage and respiratory distress present.      Breath sounds: No stridor. Decreased breath sounds and rales present. No wheezing or rhonchi.   Chest:      Chest wall: No tenderness.   Abdominal:      General: Bowel sounds are normal. There is no distension.      Palpations: Abdomen is soft. There is no mass.      Tenderness: There is no abdominal tenderness. There is no guarding or rebound.      Hernia: No hernia is present.   Musculoskeletal: Normal range of motion.         General: No swelling, tenderness, deformity or signs of injury.      Right lower leg: No edema.      Left lower leg: No edema.   Lymphadenopathy:      Cervical: No cervical adenopathy.   Skin:     General: Skin is warm and dry.      Coloration: Skin is not jaundiced or pale.      Findings: No bruising, erythema or rash.   Neurological:      General: No focal deficit present.      Mental Status: He is alert and oriented to person, place, and time. Mental status is at baseline.      Cranial Nerves: No cranial nerve deficit.      Sensory: No sensory deficit.      Motor: No weakness or abnormal  muscle tone.      Coordination: Coordination normal.   Psychiatric:         Mood and Affect: Mood normal.         Behavior: Behavior normal.         Thought Content: Thought content normal.         Judgment: Judgment normal.               Procedures:              Results Review:     I reviewed the patient's new clinical results.      Lab Results (last 24 hours)     Procedure Component Value Units Date/Time    Troponin [[id number]]  (Normal) Collected: 12/04/20 0525    Specimen: Blood Updated: 12/04/20 0600     Troponin T <0.010 ng/mL     Narrative:      Troponin T Reference Range:  <= 0.03 ng/mL-   Negative for AMI  >0.03 ng/mL-     Abnormal for myocardial necrosis.  Clinicians would have to utilize clinical acumen, EKG, Troponin and serial changes to determine if it is an Acute Myocardial Infarction or myocardial injury due to an underlying chronic condition.       Results may be falsely decreased if patient taking Biotin.      Comprehensive Metabolic Panel [[id number]]  (Abnormal) Collected: 12/04/20 0240    Specimen: Blood Updated: 12/04/20 0319     Glucose 154 mg/dL      BUN 37 mg/dL      Creatinine 0.94 mg/dL      Sodium 140 mmol/L      Potassium 5.1 mmol/L      Chloride 108 mmol/L      CO2 26.0 mmol/L      Calcium 8.5 mg/dL      Total Protein 5.9 g/dL      Albumin 3.00 g/dL      ALT (SGPT) 24 U/L      AST (SGOT) 23 U/L      Alkaline Phosphatase 84 U/L      Total Bilirubin 0.4 mg/dL      eGFR Non African Amer 80 mL/min/1.73      Globulin 2.9 gm/dL      A/G Ratio 1.0 g/dL      BUN/Creatinine Ratio 39.4     Anion Gap 6.0 mmol/L     Narrative:      GFR Normal >60  Chronic Kidney Disease <60  Kidney Failure <15      CK [[id number]]  (Normal) Collected: 12/04/20 0240    Specimen: Blood Updated: 12/04/20 0319     Creatine Kinase 52 U/L     C-reactive Protein [[id number]]  (Abnormal) Collected: 12/04/20 0240    Specimen: Blood Updated: 12/04/20 0319     C-Reactive Protein 3.88 mg/dL     Lactate Dehydrogenase [[id number]]   (Abnormal) Collected: 12/04/20 0240    Specimen: Blood Updated: 12/04/20 0319      U/L     Magnesium [[id number]]  (Normal) Collected: 12/04/20 0240    Specimen: Blood Updated: 12/04/20 0319     Magnesium 2.0 mg/dL     Ferritin [[id number]]  (Normal) Collected: 12/04/20 0240    Specimen: Blood Updated: 12/04/20 0316     Ferritin 285.20 ng/mL     Narrative:      Results may be falsely decreased if patient taking Biotin.      D-dimer, Quantitative [[id number]]  (Abnormal) Collected: 12/04/20 0240    Specimen: Blood Updated: 12/04/20 0311     D-Dimer, Quantitative 1.20 mg/L (FEU)     Narrative:      Reference Range  --------------------------------------------------------------------     < 0.50   Negative Predictive Value  0.50-0.59   Indeterminate    >= 0.60   Probable VTE             A very low percentage of patients with DVT may yield D-Dimer results   below the cut-off of 0.50 mg/L FEU.  This is known to be more   prevalent in patients with distal DVT.             Results of this test should always be interpreted in conjunction with   the patient's medical history, clinical presentation and other   findings.  Clinical diagnosis should not be based on the result of   INNOVANCE D-Dimer alone.    Fibrinogen [[id number]]  (Abnormal) Collected: 12/04/20 0240    Specimen: Blood Updated: 12/04/20 0311     Fibrinogen 464 mg/dL     CBC & Differential [[id number]]  (Abnormal) Collected: 12/04/20 0240    Specimen: Blood Updated: 12/04/20 0250    Narrative:      The following orders were created for panel order CBC & Differential.  Procedure                               Abnormality         Status                     ---------                               -----------         ------                     CBC Auto Differential[[id number]]        Abnormal            Final result                 Please view results for these tests on the individual orders.    CBC Auto Differential [[id number]]  (Abnormal) Collected: 12/04/20 0240       Specimen: Blood Updated: 12/04/20 0250     WBC 5.50 10*3/mm3      RBC 3.65 10*6/mm3      Hemoglobin 11.1 g/dL      Hematocrit 35.1 %      MCV 96.3 fL      MCH 30.5 pg      MCHC 31.7 g/dL      RDW 15.2 %      RDW-SD 51.2 fl      MPV 8.2 fL      Platelets 227 10*3/mm3      Neutrophil % 88.0 %      Lymphocyte % 4.4 %      Monocyte % 7.5 %      Eosinophil % 0.0 %      Basophil % 0.1 %      Neutrophils, Absolute 4.80 10*3/mm3      Lymphocytes, Absolute 0.20 10*3/mm3      Monocytes, Absolute 0.40 10*3/mm3      Eosinophils, Absolute 0.00 10*3/mm3      Basophils, Absolute 0.00 10*3/mm3      nRBC 0.1 /100 WBC     Blood Culture - Blood, Arm, Right [[id number]] Collected: 12/01/20 0020    Specimen: Blood from Arm, Right Updated: 12/04/20 0030     Blood Culture No growth at 3 days        No results found for: HGBA1C  Results from last 7 days   Lab Units 12/01/20  1102 12/01/20  0020   INR  1.05 1.17*       Results from last 7 days   Lab Units 12/01/20  0027   PH, ARTERIAL pH units 7.396   PO2 ART mm Hg 64.2*   PCO2, ARTERIAL mm Hg 43.2   HCO3 ART mmol/L 26.5     No results found for: LIPASE  No results found for: CHOL, CHLPL, TRIG, HDL, LDL, LDLDIRECT    No results found for: INTRAOP, PREDX, FINALDX, COMDX    Microbiology Results (last 10 days)     Procedure Component Value - Date/Time    Respiratory Panel PCR w/COVID-19(SARS-CoV-2) ARNAV/PURNIMA/HOLLY/PAD/COR/MAD/GLADYS In-House, NP Swab in UTM/VTM, 3-4 HR TAT - Swab, Nasopharynx [[id number]]  (Abnormal) Collected: 12/02/20 0548    Lab Status: Final result Specimen: Swab from Nasopharynx Updated: 12/02/20 0659     ADENOVIRUS, PCR Not Detected     Coronavirus 229E Not Detected     Coronavirus HKU1 Not Detected     Coronavirus NL63 Not Detected     Coronavirus OC43 Not Detected     COVID19 Detected     Human Metapneumovirus Not Detected     Human Rhinovirus/Enterovirus Not Detected     Influenza A PCR Not Detected     Influenza B PCR Not Detected     Parainfluenza Virus 1 Not Detected      Parainfluenza Virus 2 Not Detected     Parainfluenza Virus 3 Not Detected     Parainfluenza Virus 4 Not Detected     RSV, PCR Not Detected     Bordetella pertussis pcr Not Detected     Bordetella parapertussis PCR Not Detected     Chlamydophila pneumoniae PCR Not Detected     Mycoplasma pneumo by PCR Not Detected    Narrative:      Fact sheet for providers: https://docs.[employer]/wp-content/uploads/IIK6658-7576-WI5.1-EUA-Provider-Fact-Sheet-3.pdf    Fact sheet for patients: https://docs.[employer]/wp-content/uploads/TGG0790-6291-UQ8.1-EUA-Patient-Fact-Sheet-1.pdf    Respiratory Panel, PCR (WITHOUT COVID) - Swab, Nasopharynx [[id number]]  (Normal) Collected: 12/01/20 0507    Lab Status: Final result Specimen: Swab from Nasopharynx Updated: 12/01/20 0704     ADENOVIRUS, PCR Not Detected     Coronavirus 229E Not Detected     Coronavirus HKU1 Not Detected     Coronavirus NL63 Not Detected     Coronavirus OC43 Not Detected     Human Metapneumovirus Not Detected     Human Rhinovirus/Enterovirus Not Detected     Influenza B PCR Not Detected     Parainfluenza Virus 1 Not Detected     Parainfluenza Virus 2 Not Detected     Parainfluenza Virus 3 Not Detected     Parainfluenza Virus 4 Not Detected     Bordetella pertussis pcr Not Detected     Influenza A H1 2009 PCR Not Detected     Chlamydophila pneumoniae PCR Not Detected     Mycoplasma pneumo by PCR Not Detected     Influenza A PCR Not Detected     Influenza A H3 Not Detected     Influenza A H1 Not Detected     RSV, PCR Not Detected     Bordetella parapertussis PCR Not Detected    Narrative:      The coronavirus on the RVP is NOT COVID-19 and is NOT indicative of infection with COVID-19.     Blood Culture - Blood, Arm, Right [[id number]] Collected: 12/01/20 0020    Lab Status: Preliminary result Specimen: Blood from Arm, Right Updated: 12/04/20 0030     Blood Culture No growth at 3 days    Blood Culture - Blood, Arm, Left [[id number]]  (Abnormal) Collected: 12/01/20  0020    Lab Status: Final result Specimen: Blood from Arm, Left Updated: 12/02/20 1309     Blood Culture Staphylococcus, coagulase negative     Comment: Probable contaminant requires clinical correlation, susceptibility not performed unless requested by physician.          Isolated from Aerobic Bottle     Gram Stain Aerobic Bottle Gram positive cocci in clusters    Blood Culture ID, PCR - Blood, Arm, Left [[id number]]  (Abnormal) Collected: 12/01/20 0020    Lab Status: Final result Specimen: Blood from Arm, Left Updated: 12/01/20 2241     BCID, PCR Staphylococcus spp, not aureus. Identification by BCID PCR.          ECG/EMG Results (most recent)     Procedure Component Value Units Date/Time    ECG 12 Lead [[id number]]  (Abnormal) Resulted: 12/01/20 0256     Updated: 12/01/20 0256    ECG 12 Lead [[id number]] Collected: 12/01/20 0012     Updated: 12/01/20 1524     QT Interval 447 ms     Narrative:      HEART RATE= 91  bpm  RR Interval= 659  ms  IL Interval=   ms  P Horizontal Axis=   deg  P Front Axis=   deg  QRSD Interval= 93  ms  QT Interval= 447  ms  QRS Axis= -36  deg  T Wave Axis= -23  deg  - ABNORMAL ECG -  Atrial fibrillation  Inferior infarct, age indeterminate  Anterior infarct, old  Prolonged QT interval  No previous ECG available for comparison  Electronically Signed By: Ryne Portillo ([facility]) 01-Dec-2020 15:22:22  Date and Time of Study: 2020-12-01 00:12:20    ECG 12 Lead [[id number]] Collected: 12/04/20 0525     Updated: 12/04/20 0527     QT Interval 391 ms     Narrative:      HEART RATE= 75  bpm  RR Interval= 801  ms  IL Interval=   ms  P Horizontal Axis=   deg  P Front Axis=   deg  QRSD Interval= 97  ms  QT Interval= 391  ms  QRS Axis= -37  deg  T Wave Axis= -5  deg  - ABNORMAL ECG -  Atrial fibrillation  Inferior infarct, old  Anterior infarct, old  Electronically Signed By:   Date and Time of Study: 2020-12-04 05:25:33                    Xr Chest 1 View    Result Date: 12/1/2020  Diffuse bilateral infiltrates  consistent with multifocal pneumonia. Electronically signed by:  Naveed Welch M.D.  12/1/2020 12:26 AM    Ct Chest Pulmonary Embolism    Result Date: 12/1/2020  1. Likely subacute pulmonary embolus within the distal main pulmonary artery on the left extending into the interlobar artery of the left lower lobe. 2. No evidence of thoracic aortic aneurysm or dissection. 3. Findings throughout the lungs likely related to a Covid pneumonia given the patient's clinical history. Follow-up to clearing recommended. Likely area of rounded atelectasis, left lower lobe. 4. Emphysema. These critical findings were discussed with JT Kimball Electronically signed by:  Costa Lou D.O.  12/1/2020 12:34 AM          Xrays, labs reviewed personally by physician.    Medication Review:   I have reviewed the patient's current medication list      Scheduled Meds  Acetylcysteine, 600 mg, Oral, BID  albuterol sulfate HFA, 2 puff, Inhalation, 4x Daily - RT  ammonium lactate, , Topical, Q12H  apixaban, 10 mg, Oral, BID    Followed by  [START ON 12/10/2020] apixaban, 5 mg, Oral, BID  atorvastatin, 40 mg, Oral, Daily  budesonide-formoterol, 2 puff, Inhalation, BID - RT  bumetanide, 1 mg, Oral, Daily  busPIRone, 15 mg, Oral, TID  cefepime, 2 g, Intravenous, Q8H  cetirizine, 10 mg, Oral, Nightly  cloNIDine, 0.05 mg, Oral, Q12H  cyclobenzaprine, 5 mg, Oral, TID  dexamethasone, 12 mg, Intravenous, Daily  dilTIAZem CD, 120 mg, Oral, Daily  docusate sodium, 100 mg, Oral, TID  doxycycline, 100 mg, Oral, Q12H  escitalopram, 5 mg, Oral, Daily  guaiFENesin, 600 mg, Oral, Q12H  lidocaine, 1 patch, Transdermal, Q24H  melatonin, 10 mg, Oral, Nightly  metoclopramide, 10 mg, Oral, TID AC  montelukast, 10 mg, Oral, Nightly  pantoprazole, 40 mg, Oral, Q AM  potassium chloride, 20 mEq, Oral, Daily With Lunch  remdesivir, 100 mg, Intravenous, Q24H  simethicone, 120 mg, Oral, TID  sodium chloride, 10 mL, Intravenous, Q12H  tamsulosin, 0.4 mg, Oral,  Daily  thiamine, 200 mg, Oral, Daily  traZODone, 75 mg, Oral, Nightly  vitamin C, 1,000 mg, Oral, Daily  vitamin D3, 5,000 Units, Oral, Daily  zinc sulfate, 220 mg, Oral, BID        Meds Infusions  Pharmacy Consult - Pharmacy to dose,         Meds PRN  •  acetaminophen **OR** acetaminophen **OR** acetaminophen  •  acetaminophen  •  benzonatate  •  bismuth subsalicylate  •  fleet enema  •  HYDROcodone-acetaminophen  •  lactulose  •  magnesium sulfate **OR** magnesium sulfate in D5W 1g/100mL (PREMIX)  •  melatonin  •  nitroglycerin  •  ondansetron **OR** ondansetron  •  Pharmacy Consult - Pharmacy to dose  •  polyethylene glycol  •  potassium chloride **OR** potassium chloride **OR** potassium chloride  •  sodium chloride  •  sodium chloride  •  sodium chloride  •  witch hazel-glycerin        Assessment/Plan   Assessment/Plan     Active Hospital Problems    Diagnosis  POA   • **Acute respiratory failure due to COVID-19 (CMS/HCC) [U07.1, J96.00]  Yes   • Pneumonia of both lungs due to infectious organism [J18.9]  Unknown   • Subacute pulmonary embolism (CMS/HCC) [I26.99]  Unknown   • Renal disease [N28.9]  Yes   • CAD (coronary artery disease) [I25.10]  Yes   • Hypertension [I10]  Yes   • Stroke (CMS/HCC) [I63.9]  Yes   • Mood disorder (CMS/HCC) [F39]  Yes   • GERD (gastroesophageal reflux disease) [K21.9]  Yes   • Afib (CMS/HCC) [I48.91]  Yes   • Insomnia [G47.00]  Yes   • BPH (benign prostatic hyperplasia) [N40.0]  Yes      Resolved Hospital Problems   No resolved problems to display.       MEDICAL DECISION MAKING COMPLEXITY BY PROBLEM:        Acute respiratory failure due to Covid-19, Pneumonia due to Covid-19  -ABG: showed ph 7.39, CO2 43.2, O2 64, HCO3 26.5 on 100% non-rebreather  -currently on Bipap at 100%  -CT PE protocol: likely subacute pulmonary embolus within the distal main pulmonary artery on the left extending into the interlobar artery of the left lower lobe. Findings throughout the lungs likely related  to Covid pneumonia. Emphysema  -, ferritin normal, procalcitonin 0.39, d-dimer 0.62, WBC normal, temp 99.2  -given IV rocephin, zithromax, and decadron in the ER  On Decadron and Remdesivir  De-escalate antibiotics.  Now on doxycycline  On Bumex  Appreciate pulmonary input    Acute pulmonary embolus  -CT PE protocol as above  -on heparin drip  Switch to Eliquis.      Positive blood culture likely contamination with coagulase-negative staph x1        CKD  -creatinine 1.31  -monitor BMP     COPD  -on oxygen at night at baseline  -tx as above     Chronic atrial fibrillation  -cont home cardizem     Hypertension  -cont home cardizem,   Decrease dose of clonidine  Holding losartan     Previous CVA  -on chronic anticoagulation,   On statin       Depression  -cont home buspar, lexapro     Chronic pain  -on norco (Verified by Rx by [facility] MAR)     Chronic constipation  -prn laxatives      BPH  -cont home flomax        VTE Prophylaxis - switch to heparin drip       VTE Prophylaxis -   Mechanical Order History:     None      Pharmalogical Order History:      Ordered     Dose Route Frequency Stop    12/01/20 0309  enoxaparin (LOVENOX) syringe 40 mg  Status:  Discontinued      40 mg SC Every 24 Hours 12/01/20 1041    12/01/20 1042  heparin 78020 units/250 mL (100 units/mL) in 0.45 % NaCl infusion  14.97 mL/hr      14.4 Units/kg/hr IV Titrated --    12/01/20 1042  heparin bolus from bag 4,200 Units      40 Units/kg IV Every 6 Hours PRN --    12/01/20 1042  heparin bolus from bag 8,300 Units      80 Units/kg IV Every 6 Hours PRN --    12/01/20 0238  enoxaparin (LOVENOX) syringe 100 mg  Status:  Discontinued      1 mg/kg SC Once 12/01/20 0239                  Code Status -   Code Status and Medical Interventions:   Ordered at: 12/01/20 0308     Code Status:    CPR     Medical Interventions (Level of Support Prior to Arrest):    Full       This patient has been examined wearing appropriate Personal Protective Equipment and  discussed with hospital infection control department. 12/04/20        Discharge Planning  nh        Electronically signed by Mono Estes MD, 12/04/20, 12:25 EST.  [facility] Hospitalist Team

## 2020-12-04 NOTE — PLAN OF CARE
Goal Outcome Evaluation:  Plan of Care Reviewed With: patient  Progress: no change   The patient has slept for the majority of the shift and his vitals have been stable. At about 0500 the patient expressed that he was having some chest pain aggravated by coughing. He had an EKG and troponin both were negative, Russo NP aware. Since then the patient has also expressed some anxiety, Atarax has been ordered, will try medication. Will continue to monitor.

## 2020-12-04 NOTE — PLAN OF CARE
Goal Outcome Evaluation:  Plan of Care Reviewed With: patient  Progress: improving  Outcome Summary: Pt on 25/80 PF. Will wean as tolerated. No complaints other than being cold. Will continue to monitor.

## 2020-12-05 LAB
ALBUMIN SERPL-MCNC: 3.2 G/DL (ref 3.5–5.2)
ALBUMIN/GLOB SERPL: 1 G/DL
ALP SERPL-CCNC: 89 U/L (ref 39–117)
ALT SERPL W P-5'-P-CCNC: 39 U/L (ref 1–41)
ANION GAP SERPL CALCULATED.3IONS-SCNC: 8 MMOL/L (ref 5–15)
AST SERPL-CCNC: 33 U/L (ref 1–40)
BASOPHILS # BLD AUTO: 0 10*3/MM3 (ref 0–0.2)
BASOPHILS NFR BLD AUTO: 0.2 % (ref 0–1.5)
BILIRUB SERPL-MCNC: 0.7 MG/DL (ref 0–1.2)
BUN SERPL-MCNC: 38 MG/DL (ref 8–23)
BUN/CREAT SERPL: 35.8 (ref 7–25)
CALCIUM SPEC-SCNC: 8.9 MG/DL (ref 8.6–10.5)
CHLORIDE SERPL-SCNC: 106 MMOL/L (ref 98–107)
CK SERPL-CCNC: 61 U/L (ref 20–200)
CO2 SERPL-SCNC: 27 MMOL/L (ref 22–29)
CREAT SERPL-MCNC: 1.06 MG/DL (ref 0.76–1.27)
CRP SERPL-MCNC: 2.18 MG/DL (ref 0–0.5)
DEPRECATED RDW RBC AUTO: 49 FL (ref 37–54)
EOSINOPHIL # BLD AUTO: 0 10*3/MM3 (ref 0–0.4)
EOSINOPHIL NFR BLD AUTO: 0 % (ref 0.3–6.2)
ERYTHROCYTE [DISTWIDTH] IN BLOOD BY AUTOMATED COUNT: 14.9 % (ref 12.3–15.4)
FERRITIN SERPL-MCNC: 259.9 NG/ML (ref 30–400)
GFR SERPL CREATININE-BSD FRML MDRD: 70 ML/MIN/1.73
GLOBULIN UR ELPH-MCNC: 3.2 GM/DL
GLUCOSE SERPL-MCNC: 114 MG/DL (ref 65–99)
HCT VFR BLD AUTO: 39.2 % (ref 37.5–51)
HGB BLD-MCNC: 12.5 G/DL (ref 13–17.7)
LYMPHOCYTES # BLD AUTO: 0.5 10*3/MM3 (ref 0.7–3.1)
LYMPHOCYTES NFR BLD AUTO: 6.2 % (ref 19.6–45.3)
MAGNESIUM SERPL-MCNC: 1.9 MG/DL (ref 1.6–2.4)
MCH RBC QN AUTO: 30.3 PG (ref 26.6–33)
MCHC RBC AUTO-ENTMCNC: 32 G/DL (ref 31.5–35.7)
MCV RBC AUTO: 94.9 FL (ref 79–97)
MONOCYTES # BLD AUTO: 0.6 10*3/MM3 (ref 0.1–0.9)
MONOCYTES NFR BLD AUTO: 7.5 % (ref 5–12)
NEUTROPHILS NFR BLD AUTO: 6.4 10*3/MM3 (ref 1.7–7)
NEUTROPHILS NFR BLD AUTO: 86.1 % (ref 42.7–76)
NRBC BLD AUTO-RTO: 0.1 /100 WBC (ref 0–0.2)
PLATELET # BLD AUTO: 262 10*3/MM3 (ref 140–450)
PMV BLD AUTO: 8.5 FL (ref 6–12)
POTASSIUM SERPL-SCNC: 4.6 MMOL/L (ref 3.5–5.2)
PROT SERPL-MCNC: 6.4 G/DL (ref 6–8.5)
RBC # BLD AUTO: 4.13 10*6/MM3 (ref 4.14–5.8)
SODIUM SERPL-SCNC: 141 MMOL/L (ref 136–145)
WBC # BLD AUTO: 7.5 10*3/MM3 (ref 3.4–10.8)

## 2020-12-05 PROCEDURE — 85025 COMPLETE CBC W/AUTO DIFF WBC: CPT | Performed by: STUDENT IN AN ORGANIZED HEALTH CARE EDUCATION/TRAINING PROGRAM

## 2020-12-05 PROCEDURE — 80053 COMPREHEN METABOLIC PANEL: CPT | Performed by: STUDENT IN AN ORGANIZED HEALTH CARE EDUCATION/TRAINING PROGRAM

## 2020-12-05 PROCEDURE — 25010000002 DEXAMETHASONE PER 1 MG: Performed by: INTERNAL MEDICINE

## 2020-12-05 PROCEDURE — 94799 UNLISTED PULMONARY SVC/PX: CPT

## 2020-12-05 PROCEDURE — 82550 ASSAY OF CK (CPK): CPT | Performed by: STUDENT IN AN ORGANIZED HEALTH CARE EDUCATION/TRAINING PROGRAM

## 2020-12-05 PROCEDURE — 94660 CPAP INITIATION&MGMT: CPT

## 2020-12-05 PROCEDURE — 83735 ASSAY OF MAGNESIUM: CPT | Performed by: STUDENT IN AN ORGANIZED HEALTH CARE EDUCATION/TRAINING PROGRAM

## 2020-12-05 PROCEDURE — 82728 ASSAY OF FERRITIN: CPT | Performed by: STUDENT IN AN ORGANIZED HEALTH CARE EDUCATION/TRAINING PROGRAM

## 2020-12-05 PROCEDURE — 86140 C-REACTIVE PROTEIN: CPT | Performed by: STUDENT IN AN ORGANIZED HEALTH CARE EDUCATION/TRAINING PROGRAM

## 2020-12-05 PROCEDURE — 99233 SBSQ HOSP IP/OBS HIGH 50: CPT | Performed by: INTERNAL MEDICINE

## 2020-12-05 PROCEDURE — 94760 N-INVAS EAR/PLS OXIMETRY 1: CPT

## 2020-12-05 RX ORDER — CLONIDINE HYDROCHLORIDE 0.1 MG/1
0.1 TABLET ORAL EVERY 12 HOURS SCHEDULED
Status: DISCONTINUED | OUTPATIENT
Start: 2020-12-05 | End: 2020-12-11 | Stop reason: HOSPADM

## 2020-12-05 RX ADMIN — METOCLOPRAMIDE 10 MG: 10 TABLET ORAL at 17:45

## 2020-12-05 RX ADMIN — CYCLOBENZAPRINE HYDROCHLORIDE 5 MG: 10 TABLET, FILM COATED ORAL at 09:44

## 2020-12-05 RX ADMIN — Medication: at 21:01

## 2020-12-05 RX ADMIN — Medication 600 MG: at 21:00

## 2020-12-05 RX ADMIN — BUSPIRONE HYDROCHLORIDE 15 MG: 10 TABLET ORAL at 21:01

## 2020-12-05 RX ADMIN — CYCLOBENZAPRINE HYDROCHLORIDE 5 MG: 10 TABLET, FILM COATED ORAL at 17:44

## 2020-12-05 RX ADMIN — ZINC SULFATE 220 MG (50 MG) CAPSULE 220 MG: CAPSULE at 20:59

## 2020-12-05 RX ADMIN — CETIRIZINE HYDROCHLORIDE 10 MG: 10 TABLET, FILM COATED ORAL at 21:00

## 2020-12-05 RX ADMIN — DOXYCYCLINE 100 MG: 100 TABLET, FILM COATED ORAL at 09:44

## 2020-12-05 RX ADMIN — ZINC SULFATE 220 MG (50 MG) CAPSULE 220 MG: CAPSULE at 09:54

## 2020-12-05 RX ADMIN — Medication 10 ML: at 09:42

## 2020-12-05 RX ADMIN — SIMETHICONE 120 MG: 80 TABLET, CHEWABLE ORAL at 17:45

## 2020-12-05 RX ADMIN — APIXABAN 10 MG: 5 TABLET, FILM COATED ORAL at 09:54

## 2020-12-05 RX ADMIN — Medication 5000 UNITS: at 09:44

## 2020-12-05 RX ADMIN — POTASSIUM CHLORIDE 20 MEQ: 1500 TABLET, EXTENDED RELEASE ORAL at 12:44

## 2020-12-05 RX ADMIN — DOCUSATE SODIUM 100 MG: 100 CAPSULE, LIQUID FILLED ORAL at 09:45

## 2020-12-05 RX ADMIN — CYCLOBENZAPRINE HYDROCHLORIDE 5 MG: 10 TABLET, FILM COATED ORAL at 20:59

## 2020-12-05 RX ADMIN — APIXABAN 10 MG: 5 TABLET, FILM COATED ORAL at 21:01

## 2020-12-05 RX ADMIN — MONTELUKAST 10 MG: 10 TABLET, FILM COATED ORAL at 21:00

## 2020-12-05 RX ADMIN — TRAZODONE HYDROCHLORIDE 75 MG: 50 TABLET ORAL at 21:01

## 2020-12-05 RX ADMIN — DEXAMETHASONE SODIUM PHOSPHATE 6 MG: 4 INJECTION, SOLUTION INTRAMUSCULAR; INTRAVENOUS at 09:55

## 2020-12-05 RX ADMIN — HYDROCODONE BITARTRATE AND ACETAMINOPHEN 1 TABLET: 5; 325 TABLET ORAL at 21:00

## 2020-12-05 RX ADMIN — PANTOPRAZOLE SODIUM 40 MG: 40 TABLET, DELAYED RELEASE ORAL at 04:40

## 2020-12-05 RX ADMIN — HYDROCODONE BITARTRATE AND ACETAMINOPHEN 1 TABLET: 5; 325 TABLET ORAL at 12:45

## 2020-12-05 RX ADMIN — ATORVASTATIN CALCIUM 40 MG: 40 TABLET, FILM COATED ORAL at 09:54

## 2020-12-05 RX ADMIN — Medication 10 ML: at 21:02

## 2020-12-05 RX ADMIN — SIMETHICONE 120 MG: 80 TABLET, CHEWABLE ORAL at 09:44

## 2020-12-05 RX ADMIN — Medication 600 MG: at 09:44

## 2020-12-05 RX ADMIN — DOXYCYCLINE 100 MG: 100 TABLET, FILM COATED ORAL at 21:00

## 2020-12-05 RX ADMIN — DILTIAZEM HYDROCHLORIDE 120 MG: 120 CAPSULE, COATED, EXTENDED RELEASE ORAL at 09:45

## 2020-12-05 RX ADMIN — ESCITALOPRAM OXALATE 5 MG: 10 TABLET ORAL at 09:45

## 2020-12-05 RX ADMIN — Medication: at 09:42

## 2020-12-05 RX ADMIN — Medication 200 MG: at 09:54

## 2020-12-05 RX ADMIN — BUDESONIDE AND FORMOTEROL FUMARATE DIHYDRATE 2 PUFF: 160; 4.5 AEROSOL RESPIRATORY (INHALATION) at 08:06

## 2020-12-05 RX ADMIN — LIDOCAINE 1 PATCH: 50 PATCH TOPICAL at 09:49

## 2020-12-05 RX ADMIN — Medication 10 MG: at 21:01

## 2020-12-05 RX ADMIN — CLONIDINE HYDROCHLORIDE 0.1 MG: 0.1 TABLET ORAL at 21:00

## 2020-12-05 RX ADMIN — BUDESONIDE AND FORMOTEROL FUMARATE DIHYDRATE 2 PUFF: 160; 4.5 AEROSOL RESPIRATORY (INHALATION) at 18:52

## 2020-12-05 RX ADMIN — GUAIFENESIN 600 MG: 600 TABLET, EXTENDED RELEASE ORAL at 21:00

## 2020-12-05 RX ADMIN — GUAIFENESIN 600 MG: 600 TABLET, EXTENDED RELEASE ORAL at 09:43

## 2020-12-05 RX ADMIN — HYDROCODONE BITARTRATE AND ACETAMINOPHEN 1 TABLET: 5; 325 TABLET ORAL at 04:47

## 2020-12-05 RX ADMIN — ALBUTEROL SULFATE 2 PUFF: 108 AEROSOL, METERED RESPIRATORY (INHALATION) at 16:22

## 2020-12-05 RX ADMIN — BUSPIRONE HYDROCHLORIDE 15 MG: 10 TABLET ORAL at 09:43

## 2020-12-05 RX ADMIN — ALBUTEROL SULFATE 2 PUFF: 108 AEROSOL, METERED RESPIRATORY (INHALATION) at 18:51

## 2020-12-05 RX ADMIN — METOCLOPRAMIDE 10 MG: 10 TABLET ORAL at 09:45

## 2020-12-05 RX ADMIN — SODIUM CHLORIDE 100 MG: 9 INJECTION, SOLUTION INTRAVENOUS at 12:44

## 2020-12-05 RX ADMIN — CLONIDINE HYDROCHLORIDE 0.05 MG: 0.1 TABLET ORAL at 09:53

## 2020-12-05 RX ADMIN — METOCLOPRAMIDE 10 MG: 10 TABLET ORAL at 12:45

## 2020-12-05 RX ADMIN — DOCUSATE SODIUM 100 MG: 100 CAPSULE, LIQUID FILLED ORAL at 21:00

## 2020-12-05 RX ADMIN — OXYCODONE HYDROCHLORIDE AND ACETAMINOPHEN 1000 MG: 500 TABLET ORAL at 09:53

## 2020-12-05 RX ADMIN — SIMETHICONE 120 MG: 80 TABLET, CHEWABLE ORAL at 21:00

## 2020-12-05 RX ADMIN — BUSPIRONE HYDROCHLORIDE 15 MG: 10 TABLET ORAL at 17:44

## 2020-12-05 RX ADMIN — DOCUSATE SODIUM 100 MG: 100 CAPSULE, LIQUID FILLED ORAL at 17:45

## 2020-12-05 RX ADMIN — TAMSULOSIN HYDROCHLORIDE 0.4 MG: 0.4 CAPSULE ORAL at 09:43

## 2020-12-05 RX ADMIN — ALBUTEROL SULFATE 2 PUFF: 108 AEROSOL, METERED RESPIRATORY (INHALATION) at 08:06

## 2020-12-05 RX ADMIN — ALBUTEROL SULFATE 2 PUFF: 108 AEROSOL, METERED RESPIRATORY (INHALATION) at 12:04

## 2020-12-05 RX ADMIN — BUMETANIDE 1 MG: 1 TABLET ORAL at 09:43

## 2020-12-05 NOTE — PROGRESS NOTES
[facility] Medicine Services Daily Progress Note      Hospitalist Team  LOS 3 days      Patient Care Team:  Ba Loaiza MD as PCP - General (Geriatric Medicine)    Patient Location: 2125/1      Subjective   Subjective     Chief Complaint / Subjective  Chief Complaint   Patient presents with   • Shortness of Breath         Brief Synopsis of Hospital Course/HPI  HPI limited due to patient on Bipap mask, mostly taken from medical record review and ER documentation     Mr. Dowling is a 65 y.o. male with PMH of COPD on oxygen nocturnally, HTN, CAD, A.fib on Xarelto, CKD, CVA, GERD, anemia, and insomnia who presented to [facility] ER 12/1/20 from [facility] with report per EMS of shortness of breath and hypoxia. He was reported to have O2 saturation 69% on room air. He was placed on 2L O2 via NC and had O2 saturation 74%. He was placed on non-rebreather by EMS. He reported some left sided chest pain, cough. Per ER he had no fever, nausea, vomiting, or diarrhea but reported constipation. The patient tested positive at his facility on 11/23/2020 per ER documentation. He normally wears O2 at night.      In the ER patient had CT PE protocol that showed likely subacute pulmonary embolus within the distal main pulmonary artery on the left extending into the interlobar artery of the left lower lobe. Findings throughout the lungs likely related to Covid pneumonia. Emphysema. ABG showed ph 7.39, CO2 43.2, O2 64, HCO3 26.5 on 100% non-rebreather. He was placed on Bipap. WBC normal, temp 99.2. Respiratory viral panel WITHOUT Covid was negative. Troponin negative x2. Blood cultures were drawn. He was given IV rocephin, zithromax, decadron, 324mg aspirin, started treatment lovenox. He was admitted to the PCU for further treatment.                Date::      12/2/2020  Patient had been high flow oxygen.  Short of breath    12/3/2020  Still on high flow nasal cannula with precision flow device  Denies any  "nausea or vomiting or abdominal pain or chest pain  Switching to Eliquis since difficult to control his PTT with heparin drip  Discussed with nursing staff    12/4  Patient has persistent cough  Medications adjusted to help her symptoms  Events overnight reviewed  Decrease steroids  We will discuss with pulmonologist    12/5/2020  Patient lying down in bed without distress but has high flow precision cannula in place with 80%FiO2  Asked him to drink water  He denies abdominal pain but has chest pain  Previous troponin were done and were negative on he came in.  Likely related to his pneumonia and cough.  CRP decreasing      ROS  All other systems reviewed and negative    Objective   Objective      Vital Signs  Temp:  [97.3 °F (36.3 °C)-99.1 °F (37.3 °C)] 97.9 °F (36.6 °C)  Heart Rate:  [] 100  Resp:  [19-30] 25  BP: (132-155)/(69-85) 150/85  Oxygen Therapy  SpO2: 93 %  Pulse Oximetry Type: Continuous  Device (Oxygen Therapy): other (see comments)(Precision flow)  $ High Flow Nasal Cannula Set-Up: yes  Flow (L/min): 25  Oxygen Concentration (%): 70  Flowsheet Rows      First Filed Value   Admission Height  180.3 cm (71\") Documented at 11/30/2020 2358   Admission Weight  104 kg (230 lb) Documented at 11/30/2020 2358        Intake & Output (last 3 days)       12/02 0701 - 12/03 0700 12/03 0701 - 12/04 0700 12/04 0701 - 12/05 0700 12/05 0701 - 12/06 0700    P.O. 240 300 240 240    Total Intake(mL/kg) 240 (2.7) 300 (3.4) 240 (2.8) 240 (2.8)    Urine (mL/kg/hr) 650 (0.3) 1175 (0.6) 2250 (1.1) 200 (0.6)    Stool        Total Output 650 1175 2250 200    Net -410 -875 -2010 +40                Lines, Drains & Airways    Active LDAs     Name:   Placement date:   Placement time:   Site:   Days:    Peripheral IV 12/01/20 0039 Right Antecubital   12/01/20 0039    Antecubital   1                  Physical Exam:    Physical Exam  Vitals signs and nursing note reviewed.   Constitutional:       General: He is not in acute " distress.     Appearance: Normal appearance. He is well-developed. He is not ill-appearing, toxic-appearing or diaphoretic.   HENT:      Head: Normocephalic and atraumatic.      Right Ear: Ear canal and external ear normal.      Left Ear: Ear canal and external ear normal.      Nose: Nose normal. No congestion or rhinorrhea.      Mouth/Throat:      Mouth: Mucous membranes are moist.      Pharynx: No oropharyngeal exudate.   Eyes:      General: No scleral icterus.        Right eye: No discharge.         Left eye: No discharge.      Extraocular Movements: Extraocular movements intact.      Conjunctiva/sclera: Conjunctivae normal.      Pupils: Pupils are equal, round, and reactive to light.   Neck:      Musculoskeletal: Normal range of motion and neck supple. No neck rigidity or muscular tenderness.      Thyroid: No thyromegaly.      Vascular: No carotid bruit or JVD.      Trachea: No tracheal deviation.   Cardiovascular:      Rate and Rhythm: Normal rate and regular rhythm.      Pulses: Normal pulses.      Heart sounds: Normal heart sounds. No murmur. No friction rub. No gallop.    Pulmonary:      Effort: Accessory muscle usage and respiratory distress present.      Breath sounds: No stridor. Decreased breath sounds and rales present. No wheezing or rhonchi.   Chest:      Chest wall: No tenderness.   Abdominal:      General: Bowel sounds are normal. There is no distension.      Palpations: Abdomen is soft. There is no mass.      Tenderness: There is no abdominal tenderness. There is no guarding or rebound.      Hernia: No hernia is present.   Musculoskeletal: Normal range of motion.         General: No swelling, tenderness, deformity or signs of injury.      Right lower leg: No edema.      Left lower leg: No edema.   Lymphadenopathy:      Cervical: No cervical adenopathy.   Skin:     General: Skin is warm and dry.      Coloration: Skin is not jaundiced or pale.      Findings: No bruising, erythema or rash.      Neurological:      General: No focal deficit present.      Mental Status: He is alert and oriented to person, place, and time. Mental status is at baseline.      Cranial Nerves: No cranial nerve deficit.      Sensory: No sensory deficit.      Motor: No weakness or abnormal muscle tone.      Coordination: Coordination normal.   Psychiatric:         Mood and Affect: Mood normal.         Behavior: Behavior normal.         Thought Content: Thought content normal.         Judgment: Judgment normal.               Procedures:              Results Review:     I reviewed the patient's new clinical results.      Lab Results (last 24 hours)     Procedure Component Value Units Date/Time    Ferritin [[id number]]  (Normal) Collected: 12/05/20 0722    Specimen: Blood Updated: 12/05/20 0806     Ferritin 259.90 ng/mL     Narrative:      Results may be falsely decreased if patient taking Biotin.      Comprehensive Metabolic Panel [[id number]]  (Abnormal) Collected: 12/05/20 0722    Specimen: Blood Updated: 12/05/20 0803     Glucose 114 mg/dL      BUN 38 mg/dL      Creatinine 1.06 mg/dL      Sodium 141 mmol/L      Potassium 4.6 mmol/L      Chloride 106 mmol/L      CO2 27.0 mmol/L      Calcium 8.9 mg/dL      Total Protein 6.4 g/dL      Albumin 3.20 g/dL      ALT (SGPT) 39 U/L      AST (SGOT) 33 U/L      Alkaline Phosphatase 89 U/L      Total Bilirubin 0.7 mg/dL      eGFR Non African Amer 70 mL/min/1.73      Globulin 3.2 gm/dL      A/G Ratio 1.0 g/dL      BUN/Creatinine Ratio 35.8     Anion Gap 8.0 mmol/L     Narrative:      GFR Normal >60  Chronic Kidney Disease <60  Kidney Failure <15      CK [[id number]]  (Normal) Collected: 12/05/20 0722    Specimen: Blood Updated: 12/05/20 0803     Creatine Kinase 61 U/L     C-reactive Protein [[id number]]  (Abnormal) Collected: 12/05/20 0722    Specimen: Blood Updated: 12/05/20 0803     C-Reactive Protein 2.18 mg/dL     Magnesium [[id number]]  (Normal) Collected: 12/05/20 0722    Specimen: Blood  Updated: 12/05/20 0803     Magnesium 1.9 mg/dL     CBC & Differential [[id number]]  (Abnormal) Collected: 12/05/20 0722    Specimen: Blood Updated: 12/05/20 0736    Narrative:      The following orders were created for panel order CBC & Differential.  Procedure                               Abnormality         Status                     ---------                               -----------         ------                     CBC Auto Differential[[id number]]        Abnormal            Final result                 Please view results for these tests on the individual orders.    CBC Auto Differential [[id number]]  (Abnormal) Collected: 12/05/20 0722    Specimen: Blood Updated: 12/05/20 0736     WBC 7.50 10*3/mm3      RBC 4.13 10*6/mm3      Hemoglobin 12.5 g/dL      Hematocrit 39.2 %      MCV 94.9 fL      MCH 30.3 pg      MCHC 32.0 g/dL      RDW 14.9 %      RDW-SD 49.0 fl      MPV 8.5 fL      Platelets 262 10*3/mm3      Neutrophil % 86.1 %      Lymphocyte % 6.2 %      Monocyte % 7.5 %      Eosinophil % 0.0 %      Basophil % 0.2 %      Neutrophils, Absolute 6.40 10*3/mm3      Lymphocytes, Absolute 0.50 10*3/mm3      Monocytes, Absolute 0.60 10*3/mm3      Eosinophils, Absolute 0.00 10*3/mm3      Basophils, Absolute 0.00 10*3/mm3      nRBC 0.1 /100 WBC     Blood Culture - Blood, Arm, Right [[id number]] Collected: 12/01/20 0020    Specimen: Blood from Arm, Right Updated: 12/05/20 0030     Blood Culture No growth at 4 days        No results found for: HGBA1C  Results from last 7 days   Lab Units 12/01/20  1102 12/01/20  0020   INR  1.05 1.17*       Results from last 7 days   Lab Units 12/01/20  0027   PH, ARTERIAL pH units 7.396   PO2 ART mm Hg 64.2*   PCO2, ARTERIAL mm Hg 43.2   HCO3 ART mmol/L 26.5     No results found for: LIPASE  No results found for: CHOL, CHLPL, TRIG, HDL, LDL, LDLDIRECT    No results found for: INTRAOP, PREDX, FINALDX, COMDX    Microbiology Results (last 10 days)     Procedure Component Value - Date/Time     Respiratory Panel PCR w/COVID-19(SARS-CoV-2) ARNAV/PURNIMA/HOLLY/PAD/COR/MAD/GLADYS In-House, NP Swab in UTM/VTM, 3-4 HR TAT - Swab, Nasopharynx [[id number]]  (Abnormal) Collected: 12/02/20 0548    Lab Status: Final result Specimen: Swab from Nasopharynx Updated: 12/02/20 0659     ADENOVIRUS, PCR Not Detected     Coronavirus 229E Not Detected     Coronavirus HKU1 Not Detected     Coronavirus NL63 Not Detected     Coronavirus OC43 Not Detected     COVID19 Detected     Human Metapneumovirus Not Detected     Human Rhinovirus/Enterovirus Not Detected     Influenza A PCR Not Detected     Influenza B PCR Not Detected     Parainfluenza Virus 1 Not Detected     Parainfluenza Virus 2 Not Detected     Parainfluenza Virus 3 Not Detected     Parainfluenza Virus 4 Not Detected     RSV, PCR Not Detected     Bordetella pertussis pcr Not Detected     Bordetella parapertussis PCR Not Detected     Chlamydophila pneumoniae PCR Not Detected     Mycoplasma pneumo by PCR Not Detected    Narrative:      Fact sheet for providers: https://docs.[facility]/wp-content/uploads/XIR7623-6724-SN8.1-EUA-Provider-Fact-Sheet-3.pdf    Fact sheet for patients: https://[employer]/wp-content/uploads/ILX6461-4048-MO9.1-EUA-Patient-Fact-Sheet-1.pdf    Respiratory Panel, PCR (WITHOUT COVID) - Swab, Nasopharynx [[id number]]  (Normal) Collected: 12/01/20 0507    Lab Status: Final result Specimen: Swab from Nasopharynx Updated: 12/01/20 0704     ADENOVIRUS, PCR Not Detected     Coronavirus 229E Not Detected     Coronavirus HKU1 Not Detected     Coronavirus NL63 Not Detected     Coronavirus OC43 Not Detected     Human Metapneumovirus Not Detected     Human Rhinovirus/Enterovirus Not Detected     Influenza B PCR Not Detected     Parainfluenza Virus 1 Not Detected     Parainfluenza Virus 2 Not Detected     Parainfluenza Virus 3 Not Detected     Parainfluenza Virus 4 Not Detected     Bordetella pertussis pcr Not Detected     Influenza A H1 2009 PCR Not Detected      Chlamydophila pneumoniae PCR Not Detected     Mycoplasma pneumo by PCR Not Detected     Influenza A PCR Not Detected     Influenza A H3 Not Detected     Influenza A H1 Not Detected     RSV, PCR Not Detected     Bordetella parapertussis PCR Not Detected    Narrative:      The coronavirus on the RVP is NOT COVID-19 and is NOT indicative of infection with COVID-19.     Blood Culture - Blood, Arm, Right [[id number]] Collected: 12/01/20 0020    Lab Status: Preliminary result Specimen: Blood from Arm, Right Updated: 12/05/20 0030     Blood Culture No growth at 4 days    Blood Culture - Blood, Arm, Left [[id number]]  (Abnormal) Collected: 12/01/20 0020    Lab Status: Final result Specimen: Blood from Arm, Left Updated: 12/02/20 1309     Blood Culture Staphylococcus, coagulase negative     Comment: Probable contaminant requires clinical correlation, susceptibility not performed unless requested by physician.          Isolated from Aerobic Bottle     Gram Stain Aerobic Bottle Gram positive cocci in clusters    Blood Culture ID, PCR - Blood, Arm, Left [[id number]]  (Abnormal) Collected: 12/01/20 0020    Lab Status: Final result Specimen: Blood from Arm, Left Updated: 12/01/20 2241     BCID, PCR Staphylococcus spp, not aureus. Identification by BCID PCR.          ECG/EMG Results (most recent)     Procedure Component Value Units Date/Time    ECG 12 Lead [[id number]]  (Abnormal) Resulted: 12/01/20 0256     Updated: 12/01/20 0256    ECG 12 Lead [[id number]] Collected: 12/01/20 0012     Updated: 12/01/20 1524     QT Interval 447 ms     Narrative:      HEART RATE= 91  bpm  RR Interval= 659  ms  OR Interval=   ms  P Horizontal Axis=   deg  P Front Axis=   deg  QRSD Interval= 93  ms  QT Interval= 447  ms  QRS Axis= -36  deg  T Wave Axis= -23  deg  - ABNORMAL ECG -  Atrial fibrillation  Inferior infarct, age indeterminate  Anterior infarct, old  Prolonged QT interval  No previous ECG available for comparison  Electronically Signed By:  Ryne Portillo ([facility]) 01-Dec-2020 15:22:22  Date and Time of Study: 2020-12-01 00:12:20    ECG 12 Lead [[id number]] Collected: 12/04/20 0525     Updated: 12/04/20 0527     QT Interval 391 ms     Narrative:      HEART RATE= 75  bpm  RR Interval= 801  ms  [place] Interval=   ms  P Horizontal Axis=   deg  P Front Axis=   deg  QRSD Interval= 97  ms  QT Interval= 391  ms  QRS Axis= -37  deg  T Wave Axis= -5  deg  - ABNORMAL ECG -  Atrial fibrillation  Inferior infarct, old  Anterior infarct, old  Electronically Signed By:   Date and Time of Study: 2020-12-04 05:25:33                    Xr Chest 1 View    Result Date: 12/1/2020  Diffuse bilateral infiltrates consistent with multifocal pneumonia. Electronically signed by:  Naveed Welch M.D.  12/1/2020 12:26 AM    Ct Chest Pulmonary Embolism    Result Date: 12/1/2020  1. Likely subacute pulmonary embolus within the distal main pulmonary artery on the left extending into the interlobar artery of the left lower lobe. 2. No evidence of thoracic aortic aneurysm or dissection. 3. Findings throughout the lungs likely related to a Covid pneumonia given the patient's clinical history. Follow-up to clearing recommended. Likely area of rounded atelectasis, left lower lobe. 4. Emphysema. These critical findings were discussed with JT Kimball Electronically signed by:  Costa Lou D.O.  12/1/2020 12:34 AM          Xrays, labs reviewed personally by physician.    Medication Review:   I have reviewed the patient's current medication list      Scheduled Meds  Acetylcysteine, 600 mg, Oral, BID  albuterol sulfate HFA, 2 puff, Inhalation, 4x Daily - RT  ammonium lactate, , Topical, Q12H  apixaban, 10 mg, Oral, BID    Followed by  [START ON 12/10/2020] apixaban, 5 mg, Oral, BID  atorvastatin, 40 mg, Oral, Daily  budesonide-formoterol, 2 puff, Inhalation, BID - RT  bumetanide, 1 mg, Oral, Daily  busPIRone, 15 mg, Oral, TID  cetirizine, 10 mg, Oral, Nightly  cloNIDine, 0.05 mg, Oral,  Q12H  cyclobenzaprine, 5 mg, Oral, TID  dexamethasone, 6 mg, Intravenous, Daily  dilTIAZem CD, 120 mg, Oral, Daily  docusate sodium, 100 mg, Oral, TID  doxycycline, 100 mg, Oral, Q12H  escitalopram, 5 mg, Oral, Daily  guaiFENesin, 600 mg, Oral, Q12H  lidocaine, 1 patch, Transdermal, Q24H  melatonin, 10 mg, Oral, Nightly  metoclopramide, 10 mg, Oral, TID AC  montelukast, 10 mg, Oral, Nightly  pantoprazole, 40 mg, Oral, Q AM  potassium chloride, 20 mEq, Oral, Daily With Lunch  remdesivir, 100 mg, Intravenous, Q24H  simethicone, 120 mg, Oral, TID  sodium chloride, 10 mL, Intravenous, Q12H  tamsulosin, 0.4 mg, Oral, Daily  thiamine, 200 mg, Oral, Daily  traZODone, 75 mg, Oral, Nightly  vitamin C, 1,000 mg, Oral, Daily  vitamin D3, 5,000 Units, Oral, Daily  zinc sulfate, 220 mg, Oral, BID        Meds Infusions  Pharmacy Consult - Pharmacy to dose,         Meds PRN  •  acetaminophen **OR** acetaminophen **OR** acetaminophen  •  acetaminophen  •  benzonatate  •  bismuth subsalicylate  •  fleet enema  •  HYDROcodone-acetaminophen  •  lactulose  •  magnesium sulfate **OR** magnesium sulfate in D5W 1g/100mL (PREMIX)  •  melatonin  •  nitroglycerin  •  ondansetron **OR** ondansetron  •  Pharmacy Consult - Pharmacy to dose  •  polyethylene glycol  •  potassium chloride **OR** potassium chloride **OR** potassium chloride  •  sodium chloride  •  sodium chloride  •  sodium chloride  •  witch hazel-glycerin        Assessment/Plan   Assessment/Plan     Active Hospital Problems    Diagnosis  POA   • **Acute respiratory failure due to COVID-19 (CMS/[facility]) [U07.1, J96.00]  Yes   • Pneumonia of both lungs due to infectious organism [J18.9]  Unknown   • Subacute pulmonary embolism (CMS/[facility]) [I26.99]  Unknown   • Renal disease [N28.9]  Yes   • CAD (coronary artery disease) [I25.10]  Yes   • Hypertension [I10]  Yes   • Stroke (CMS/[facility]) [I63.9]  Yes   • Mood disorder (CMS/HCC) [F39]  Yes   • GERD (gastroesophageal reflux disease) [K21.9]  Yes    • Afib (CMS/HCC) [I48.91]  Yes   • Insomnia [G47.00]  Yes   • BPH (benign prostatic hyperplasia) [N40.0]  Yes      Resolved Hospital Problems   No resolved problems to display.       MEDICAL DECISION MAKING COMPLEXITY BY PROBLEM:        Acute respiratory failure due to Covid-19, Pneumonia due to Covid-19  -ABG: showed ph 7.39, CO2 43.2, O2 64, HCO3 26.5 on 100% non-rebreather  -currently on Bipap at 100%  -CT PE protocol: likely subacute pulmonary embolus within the distal main pulmonary artery on the left extending into the interlobar artery of the left lower lobe. Findings throughout the lungs likely related to Covid pneumonia. Emphysema  -, ferritin normal, procalcitonin 0.39, d-dimer 0.62, WBC normal, temp 99.2  -given IV rocephin, zithromax, and decadron in the ER  On Decadron and Remdesivir  De-escalate antibiotics.  Now on doxycycline  On Bumex  Appreciate pulmonary input    Acute pulmonary embolus  -CT PE protocol as above  -on heparin drip  Switch to Eliquis.      Positive blood culture likely contamination with coagulase-negative staph x1        CKD  -creatinine 1.31  -monitor BMP     COPD  -on oxygen at night at baseline  -tx as above     Chronic atrial fibrillation  -cont home cardizem     Hypertension  -cont home cardizem,   Decrease dose of clonidine  Holding losartan     Previous CVA  -on chronic anticoagulation,   On statin       Depression  -cont home buspar, lexapro     Chronic pain  -on norco (Verified by Rx by [facility])     Chronic constipation  -prn laxatives      BPH  -cont home flomax        VTE Prophylaxis - switch to heparin drip       VTE Prophylaxis -   Mechanical Order History:     None      Pharmalogical Order History:      Ordered     Dose Route Frequency Stop    12/01/20 0309  enoxaparin (LOVENOX) syringe 40 mg  Status:  Discontinued      40 mg SC Every 24 Hours 12/01/20 1041    12/01/20 1042  heparin 26330 units/250 mL (100 units/mL) in 0.45 % NaCl infusion  14.97 mL/hr       14.4 Units/kg/hr IV Titrated --    12/01/20 1042  heparin bolus from bag 4,200 Units      40 Units/kg IV Every 6 Hours PRN --    12/01/20 1042  heparin bolus from bag 8,300 Units      80 Units/kg IV Every 6 Hours PRN --    12/01/20 0238  enoxaparin (LOVENOX) syringe 100 mg  Status:  Discontinued      1 mg/kg SC Once 12/01/20 0239                  Code Status -   Code Status and Medical Interventions:   Ordered at: 12/01/20 0308     Code Status:    CPR     Medical Interventions (Level of Support Prior to Arrest):    Full       This patient has been examined wearing appropriate Personal Protective Equipment and discussed with hospital infection control department. 12/05/20        Discharge Planning  nh        Electronically signed by Mono Estes MD, 12/05/20, 10:58 EST.  [facility] Hospitalist Team

## 2020-12-05 NOTE — PLAN OF CARE
Problem: Adult Inpatient Plan of Care  Goal: Plan of Care Review  Outcome: Ongoing, Progressing  Flowsheets  Taken 12/5/2020 1137 by Ysabel Webb, RN  Outcome Summary: Pt on 25/70 PF. No distress noted. Will continue to monitor.  Taken 12/4/2020 1758 by Acacia Jacobson, RN  Plan of Care Reviewed With: patient   Goal Outcome Evaluation:  Plan of Care Reviewed With: patient  Progress: improving  Outcome Summary: Pt on 25/70 PF. No distress noted. Will continue to monitor.

## 2020-12-06 LAB
ALBUMIN SERPL-MCNC: 3.3 G/DL (ref 3.5–5.2)
ALBUMIN/GLOB SERPL: 0.9 G/DL
ALP SERPL-CCNC: 104 U/L (ref 39–117)
ALT SERPL W P-5'-P-CCNC: 56 U/L (ref 1–41)
ANION GAP SERPL CALCULATED.3IONS-SCNC: 11 MMOL/L (ref 5–15)
ANISOCYTOSIS BLD QL: ABNORMAL
AST SERPL-CCNC: 43 U/L (ref 1–40)
BACTERIA SPEC AEROBE CULT: NORMAL
BILIRUB SERPL-MCNC: 0.8 MG/DL (ref 0–1.2)
BUN SERPL-MCNC: 40 MG/DL (ref 8–23)
BUN/CREAT SERPL: 42.6 (ref 7–25)
CALCIUM SPEC-SCNC: 9.3 MG/DL (ref 8.6–10.5)
CHLORIDE SERPL-SCNC: 101 MMOL/L (ref 98–107)
CK SERPL-CCNC: 72 U/L (ref 20–200)
CO2 SERPL-SCNC: 24 MMOL/L (ref 22–29)
CREAT SERPL-MCNC: 0.94 MG/DL (ref 0.76–1.27)
CRP SERPL-MCNC: 2.4 MG/DL (ref 0–0.5)
D DIMER PPP FEU-MCNC: 1.19 MG/L (FEU) (ref 0–0.59)
DEPRECATED RDW RBC AUTO: 49.4 FL (ref 37–54)
ERYTHROCYTE [DISTWIDTH] IN BLOOD BY AUTOMATED COUNT: 15 % (ref 12.3–15.4)
FERRITIN SERPL-MCNC: 235 NG/ML (ref 30–400)
FIBRINOGEN PPP-MCNC: 543 MG/DL (ref 210–450)
GFR SERPL CREATININE-BSD FRML MDRD: 80 ML/MIN/1.73
GLOBULIN UR ELPH-MCNC: 3.7 GM/DL
GLUCOSE SERPL-MCNC: 88 MG/DL (ref 65–99)
HCT VFR BLD AUTO: 43.6 % (ref 37.5–51)
HGB BLD-MCNC: 14.4 G/DL (ref 13–17.7)
LDH SERPL-CCNC: 474 U/L (ref 135–225)
LYMPHOCYTES # BLD MANUAL: 0.49 10*3/MM3 (ref 0.7–3.1)
LYMPHOCYTES NFR BLD MANUAL: 4 % (ref 5–12)
LYMPHOCYTES NFR BLD MANUAL: 6 % (ref 19.6–45.3)
MAGNESIUM SERPL-MCNC: 2.1 MG/DL (ref 1.6–2.4)
MCH RBC QN AUTO: 30.9 PG (ref 26.6–33)
MCHC RBC AUTO-ENTMCNC: 33.1 G/DL (ref 31.5–35.7)
MCV RBC AUTO: 93.4 FL (ref 79–97)
MONOCYTES # BLD AUTO: 0.32 10*3/MM3 (ref 0.1–0.9)
MYELOCYTES NFR BLD MANUAL: 1 % (ref 0–0)
NEUTROPHILS # BLD AUTO: 7.21 10*3/MM3 (ref 1.7–7)
NEUTROPHILS NFR BLD MANUAL: 82 % (ref 42.7–76)
NEUTS BAND NFR BLD MANUAL: 7 % (ref 0–5)
NEUTS VAC BLD QL SMEAR: ABNORMAL
PLAT MORPH BLD: NORMAL
PLATELET # BLD AUTO: 200 10*3/MM3 (ref 140–450)
PMV BLD AUTO: 8.6 FL (ref 6–12)
POTASSIUM SERPL-SCNC: 5 MMOL/L (ref 3.5–5.2)
PROT SERPL-MCNC: 7 G/DL (ref 6–8.5)
RBC # BLD AUTO: 4.66 10*6/MM3 (ref 4.14–5.8)
SCAN SLIDE: NORMAL
SODIUM SERPL-SCNC: 136 MMOL/L (ref 136–145)
TOXIC GRANULATION: ABNORMAL
WBC # BLD AUTO: 8.1 10*3/MM3 (ref 3.4–10.8)

## 2020-12-06 PROCEDURE — 85384 FIBRINOGEN ACTIVITY: CPT | Performed by: STUDENT IN AN ORGANIZED HEALTH CARE EDUCATION/TRAINING PROGRAM

## 2020-12-06 PROCEDURE — 99232 SBSQ HOSP IP/OBS MODERATE 35: CPT | Performed by: INTERNAL MEDICINE

## 2020-12-06 PROCEDURE — 83735 ASSAY OF MAGNESIUM: CPT | Performed by: STUDENT IN AN ORGANIZED HEALTH CARE EDUCATION/TRAINING PROGRAM

## 2020-12-06 PROCEDURE — 94799 UNLISTED PULMONARY SVC/PX: CPT

## 2020-12-06 PROCEDURE — 85379 FIBRIN DEGRADATION QUANT: CPT | Performed by: STUDENT IN AN ORGANIZED HEALTH CARE EDUCATION/TRAINING PROGRAM

## 2020-12-06 PROCEDURE — 86140 C-REACTIVE PROTEIN: CPT | Performed by: STUDENT IN AN ORGANIZED HEALTH CARE EDUCATION/TRAINING PROGRAM

## 2020-12-06 PROCEDURE — 25010000002 DEXAMETHASONE PER 1 MG: Performed by: INTERNAL MEDICINE

## 2020-12-06 PROCEDURE — 80053 COMPREHEN METABOLIC PANEL: CPT | Performed by: STUDENT IN AN ORGANIZED HEALTH CARE EDUCATION/TRAINING PROGRAM

## 2020-12-06 PROCEDURE — 94660 CPAP INITIATION&MGMT: CPT

## 2020-12-06 PROCEDURE — 85025 COMPLETE CBC W/AUTO DIFF WBC: CPT | Performed by: STUDENT IN AN ORGANIZED HEALTH CARE EDUCATION/TRAINING PROGRAM

## 2020-12-06 PROCEDURE — 83615 LACTATE (LD) (LDH) ENZYME: CPT | Performed by: STUDENT IN AN ORGANIZED HEALTH CARE EDUCATION/TRAINING PROGRAM

## 2020-12-06 PROCEDURE — 82550 ASSAY OF CK (CPK): CPT | Performed by: STUDENT IN AN ORGANIZED HEALTH CARE EDUCATION/TRAINING PROGRAM

## 2020-12-06 PROCEDURE — 82728 ASSAY OF FERRITIN: CPT | Performed by: STUDENT IN AN ORGANIZED HEALTH CARE EDUCATION/TRAINING PROGRAM

## 2020-12-06 PROCEDURE — 94760 N-INVAS EAR/PLS OXIMETRY 1: CPT

## 2020-12-06 PROCEDURE — 85007 BL SMEAR W/DIFF WBC COUNT: CPT | Performed by: STUDENT IN AN ORGANIZED HEALTH CARE EDUCATION/TRAINING PROGRAM

## 2020-12-06 RX ADMIN — ALBUTEROL SULFATE 2 PUFF: 108 AEROSOL, METERED RESPIRATORY (INHALATION) at 19:36

## 2020-12-06 RX ADMIN — BUMETANIDE 1 MG: 1 TABLET ORAL at 09:41

## 2020-12-06 RX ADMIN — OXYCODONE HYDROCHLORIDE AND ACETAMINOPHEN 1000 MG: 500 TABLET ORAL at 09:43

## 2020-12-06 RX ADMIN — Medication 200 MG: at 09:42

## 2020-12-06 RX ADMIN — SIMETHICONE 120 MG: 80 TABLET, CHEWABLE ORAL at 21:24

## 2020-12-06 RX ADMIN — METOCLOPRAMIDE 10 MG: 10 TABLET ORAL at 09:40

## 2020-12-06 RX ADMIN — CYCLOBENZAPRINE HYDROCHLORIDE 5 MG: 10 TABLET, FILM COATED ORAL at 17:48

## 2020-12-06 RX ADMIN — PANTOPRAZOLE SODIUM 40 MG: 40 TABLET, DELAYED RELEASE ORAL at 05:17

## 2020-12-06 RX ADMIN — Medication: at 21:26

## 2020-12-06 RX ADMIN — TRAZODONE HYDROCHLORIDE 75 MG: 50 TABLET ORAL at 21:24

## 2020-12-06 RX ADMIN — Medication 5000 UNITS: at 09:43

## 2020-12-06 RX ADMIN — Medication 600 MG: at 21:25

## 2020-12-06 RX ADMIN — BUSPIRONE HYDROCHLORIDE 15 MG: 10 TABLET ORAL at 21:25

## 2020-12-06 RX ADMIN — CETIRIZINE HYDROCHLORIDE 10 MG: 10 TABLET, FILM COATED ORAL at 21:25

## 2020-12-06 RX ADMIN — SIMETHICONE 120 MG: 80 TABLET, CHEWABLE ORAL at 17:48

## 2020-12-06 RX ADMIN — POTASSIUM CHLORIDE 20 MEQ: 1500 TABLET, EXTENDED RELEASE ORAL at 13:15

## 2020-12-06 RX ADMIN — Medication 10 MG: at 21:25

## 2020-12-06 RX ADMIN — METOCLOPRAMIDE 10 MG: 10 TABLET ORAL at 13:15

## 2020-12-06 RX ADMIN — METOCLOPRAMIDE 10 MG: 10 TABLET ORAL at 17:48

## 2020-12-06 RX ADMIN — CLONIDINE HYDROCHLORIDE 0.1 MG: 0.1 TABLET ORAL at 21:24

## 2020-12-06 RX ADMIN — DEXAMETHASONE SODIUM PHOSPHATE 6 MG: 4 INJECTION, SOLUTION INTRAMUSCULAR; INTRAVENOUS at 09:42

## 2020-12-06 RX ADMIN — TAMSULOSIN HYDROCHLORIDE 0.4 MG: 0.4 CAPSULE ORAL at 09:43

## 2020-12-06 RX ADMIN — BUSPIRONE HYDROCHLORIDE 15 MG: 10 TABLET ORAL at 09:43

## 2020-12-06 RX ADMIN — DOCUSATE SODIUM 100 MG: 100 CAPSULE, LIQUID FILLED ORAL at 21:25

## 2020-12-06 RX ADMIN — Medication: at 09:44

## 2020-12-06 RX ADMIN — Medication 10 ML: at 09:40

## 2020-12-06 RX ADMIN — ALBUTEROL SULFATE 2 PUFF: 108 AEROSOL, METERED RESPIRATORY (INHALATION) at 12:13

## 2020-12-06 RX ADMIN — CLONIDINE HYDROCHLORIDE 0.1 MG: 0.1 TABLET ORAL at 09:44

## 2020-12-06 RX ADMIN — HYDROCODONE BITARTRATE AND ACETAMINOPHEN 1 TABLET: 5; 325 TABLET ORAL at 09:41

## 2020-12-06 RX ADMIN — ALBUTEROL SULFATE 2 PUFF: 108 AEROSOL, METERED RESPIRATORY (INHALATION) at 07:38

## 2020-12-06 RX ADMIN — BUSPIRONE HYDROCHLORIDE 15 MG: 10 TABLET ORAL at 17:47

## 2020-12-06 RX ADMIN — BUDESONIDE AND FORMOTEROL FUMARATE DIHYDRATE 2 PUFF: 160; 4.5 AEROSOL RESPIRATORY (INHALATION) at 19:36

## 2020-12-06 RX ADMIN — DOCUSATE SODIUM 100 MG: 100 CAPSULE, LIQUID FILLED ORAL at 17:48

## 2020-12-06 RX ADMIN — HYDROCODONE BITARTRATE AND ACETAMINOPHEN 1 TABLET: 5; 325 TABLET ORAL at 02:53

## 2020-12-06 RX ADMIN — BUDESONIDE AND FORMOTEROL FUMARATE DIHYDRATE 2 PUFF: 160; 4.5 AEROSOL RESPIRATORY (INHALATION) at 07:38

## 2020-12-06 RX ADMIN — CYCLOBENZAPRINE HYDROCHLORIDE 5 MG: 10 TABLET, FILM COATED ORAL at 21:25

## 2020-12-06 RX ADMIN — APIXABAN 10 MG: 5 TABLET, FILM COATED ORAL at 09:42

## 2020-12-06 RX ADMIN — ATORVASTATIN CALCIUM 40 MG: 40 TABLET, FILM COATED ORAL at 09:43

## 2020-12-06 RX ADMIN — MONTELUKAST 10 MG: 10 TABLET, FILM COATED ORAL at 21:25

## 2020-12-06 RX ADMIN — ESCITALOPRAM OXALATE 5 MG: 10 TABLET ORAL at 09:44

## 2020-12-06 RX ADMIN — DILTIAZEM HYDROCHLORIDE 120 MG: 120 CAPSULE, COATED, EXTENDED RELEASE ORAL at 09:43

## 2020-12-06 RX ADMIN — SIMETHICONE 120 MG: 80 TABLET, CHEWABLE ORAL at 09:40

## 2020-12-06 RX ADMIN — GUAIFENESIN 600 MG: 600 TABLET, EXTENDED RELEASE ORAL at 21:25

## 2020-12-06 RX ADMIN — Medication 10 ML: at 21:26

## 2020-12-06 RX ADMIN — DOXYCYCLINE 100 MG: 100 TABLET, FILM COATED ORAL at 21:25

## 2020-12-06 RX ADMIN — DOCUSATE SODIUM 100 MG: 100 CAPSULE, LIQUID FILLED ORAL at 09:40

## 2020-12-06 RX ADMIN — CYCLOBENZAPRINE HYDROCHLORIDE 5 MG: 10 TABLET, FILM COATED ORAL at 09:43

## 2020-12-06 RX ADMIN — ALBUTEROL SULFATE 2 PUFF: 108 AEROSOL, METERED RESPIRATORY (INHALATION) at 15:24

## 2020-12-06 RX ADMIN — ZINC SULFATE 220 MG (50 MG) CAPSULE 220 MG: CAPSULE at 09:43

## 2020-12-06 RX ADMIN — LIDOCAINE 1 PATCH: 50 PATCH TOPICAL at 09:41

## 2020-12-06 RX ADMIN — GUAIFENESIN 600 MG: 600 TABLET, EXTENDED RELEASE ORAL at 09:43

## 2020-12-06 RX ADMIN — APIXABAN 10 MG: 5 TABLET, FILM COATED ORAL at 21:25

## 2020-12-06 RX ADMIN — DOXYCYCLINE 100 MG: 100 TABLET, FILM COATED ORAL at 09:42

## 2020-12-06 RX ADMIN — HYDROCODONE BITARTRATE AND ACETAMINOPHEN 1 TABLET: 5; 325 TABLET ORAL at 17:48

## 2020-12-06 RX ADMIN — Medication 600 MG: at 09:43

## 2020-12-06 RX ADMIN — ZINC SULFATE 220 MG (50 MG) CAPSULE 220 MG: CAPSULE at 21:24

## 2020-12-06 NOTE — PROGRESS NOTES
[facility] Medicine Services Daily Progress Note      Hospitalist Team  LOS 4 days      Patient Care Team:  Ba Loaiza MD as PCP - General (Geriatric Medicine)    Patient Location: 2125/1      Subjective   Subjective     Chief Complaint / Subjective  Chief Complaint   Patient presents with   • Shortness of Breath         Brief Synopsis of Hospital Course/HPI  HPI limited due to patient on Bipap mask, mostly taken from medical record review and ER documentation     Mr. Dowling is a 65 y.o. male with PMH of COPD on oxygen nocturnally, HTN, CAD, A.fib on Xarelto, CKD, CVA, GERD, anemia, and insomnia who presented to [facility] 12/1/20 from [facility] [place] with report per EMS of shortness of breath and hypoxia. He was reported to have O2 saturation 69% on room air. He was placed on 2L O2 via NC and had O2 saturation 74%. He was placed on non-rebreather by EMS. He reported some left sided chest pain, cough. Per ER he had no fever, nausea, vomiting, or diarrhea but reported constipation. The patient tested positive at his facility on 11/23/2020 per ER documentation. He normally wears O2 at night.      In the ER patient had CT PE protocol that showed likely subacute pulmonary embolus within the distal main pulmonary artery on the left extending into the interlobar artery of the left lower lobe. Findings throughout the lungs likely related to Covid pneumonia. Emphysema. ABG showed ph 7.39, CO2 43.2, O2 64, HCO3 26.5 on 100% non-rebreather. He was placed on Bipap. WBC normal, temp 99.2. Respiratory viral panel WITHOUT Covid was negative. Troponin negative x2. Blood cultures were drawn. He was given IV rocephin, zithromax, decadron, 324mg aspirin, started treatment lovenox. He was admitted to the PCU for further treatment.                Date::      12/2/2020  Patient had been high flow oxygen.  Short of breath    12/3/2020  Still on high flow nasal cannula with precision flow device  Denies any  "nausea or vomiting or abdominal pain or chest pain  Switching to Eliquis since difficult to control his PTT with heparin drip  Discussed with nursing staff    12/4  Patient has persistent cough  Medications adjusted to help her symptoms  Events overnight reviewed  Decrease steroids  We will discuss with pulmonologist    12/5/2020  Patient lying down in bed without distress but has high flow precision cannula in place with 80%FiO2  Asked him to drink water  He denies abdominal pain but has chest pain  Previous troponin were done and were negative on he came in.  Likely related to his pneumonia and cough.  CRP decreasing      12/6  Patient seenStill complaining of shortness of breath.  Had intermittent dry cough.  Oxygen saturatPrecision flow cannulaion between 92 and 96Percent on 60%    ROS  All other systems reviewed and negative    Objective   Objective      Vital Signs  Temp:  [97.2 °F (36.2 °C)-97.5 °F (36.4 °C)] 97.2 °F (36.2 °C)  Heart Rate:  [] 92  Resp:  [16-28] 28  BP: (129-150)/(86-96) 129/94  Oxygen Therapy  SpO2: 100 %  Pulse Oximetry Type: Continuous  Device (Oxygen Therapy): NPPV/NIV  $ High Flow Nasal Cannula Set-Up: yes  Flow (L/min): 25  Oxygen Concentration (%): 60  Flowsheet Rows      First Filed Value   Admission Height  180.3 cm (71\") Documented at 11/30/2020 2358   Admission Weight  104 kg (230 lb) Documented at 11/30/2020 2358        Intake & Output (last 3 days)       12/03 0701 - 12/04 0700 12/04 0701 - 12/05 0700 12/05 0701 - 12/06 0700 12/06 0701 - 12/07 0700    P.O. 300 240 360 240    Total Intake(mL/kg) 300 (3.4) 240 (2.8) 360 (4.2) 240 (2.8)    Urine (mL/kg/hr) 1175 (0.6) 2250 (1.1) 1775 (0.9) 300 (0.6)    Stool   0     Total Output 1175 2250 1775 300    Net -875 -2010 -1415 -60            Stool Unmeasured Occurrence   3 x         Lines, Drains & Airways    Active LDAs     Name:   Placement date:   Placement time:   Site:   Days:    Peripheral IV 12/01/20 0039 Right Antecubital   " 12/01/20    0039    Antecubital   1                  Physical Exam:    Physical Exam  Vitals signs and nursing note reviewed.   Constitutional:       General: He is not in acute distress.     Appearance: Normal appearance. He is well-developed. He is not ill-appearing, toxic-appearing or diaphoretic.   HENT:      Head: Normocephalic and atraumatic.      Right Ear: Ear canal and external ear normal.      Left Ear: Ear canal and external ear normal.      Nose: Nose normal. No congestion or rhinorrhea.      Mouth/Throat:      Mouth: Mucous membranes are moist.      Pharynx: No oropharyngeal exudate.   Eyes:      General: No scleral icterus.        Right eye: No discharge.         Left eye: No discharge.      Extraocular Movements: Extraocular movements intact.      Conjunctiva/sclera: Conjunctivae normal.      Pupils: Pupils are equal, round, and reactive to light.   Neck:      Musculoskeletal: Normal range of motion and neck supple. No neck rigidity or muscular tenderness.      Thyroid: No thyromegaly.      Vascular: No carotid bruit or JVD.      Trachea: No tracheal deviation.   Cardiovascular:      Rate and Rhythm: Normal rate and regular rhythm.      Pulses: Normal pulses.      Heart sounds: Normal heart sounds. No murmur. No friction rub. No gallop.    Pulmonary:      Effort: Accessory muscle usage and respiratory distress present.      Breath sounds: No stridor. Decreased breath sounds and rales present. No wheezing or rhonchi.   Chest:      Chest wall: No tenderness.   Abdominal:      General: Bowel sounds are normal. There is no distension.      Palpations: Abdomen is soft. There is no mass.      Tenderness: There is no abdominal tenderness. There is no guarding or rebound.      Hernia: No hernia is present.   Musculoskeletal: Normal range of motion.         General: No swelling, tenderness, deformity or signs of injury.      Right lower leg: No edema.      Left lower leg: No edema.   Lymphadenopathy:       Cervical: No cervical adenopathy.   Skin:     General: Skin is warm and dry.      Coloration: Skin is not jaundiced or pale.      Findings: No bruising, erythema or rash.   Neurological:      General: No focal deficit present.      Mental Status: He is alert and oriented to person, place, and time. Mental status is at baseline.      Cranial Nerves: No cranial nerve deficit.      Sensory: No sensory deficit.      Motor: No weakness or abnormal muscle tone.      Coordination: Coordination normal.   Psychiatric:         Mood and Affect: Mood normal.         Behavior: Behavior normal.         Thought Content: Thought content normal.         Judgment: Judgment normal.               Procedures:              Results Review:     I reviewed the patient's new clinical results.      Lab Results (last 24 hours)     Procedure Component Value Units Date/Time    CBC & Differential [[id number]] Collected: 12/06/20 0358    Specimen: Blood Updated: 12/06/20 0729    Narrative:      The following orders were created for panel order CBC & Differential.  Procedure                               Abnormality         Status                     ---------                               -----------         ------                     Scan Slide[[id number]]                                       Final result               CBC Auto Differential[[id number]]        Normal              Final result                 Please view results for these tests on the individual orders.    CBC Auto Differential [[id number]]  (Normal) Collected: 12/06/20 0358    Specimen: Blood Updated: 12/06/20 0729     WBC 8.10 10*3/mm3      RBC 4.66 10*6/mm3      Hemoglobin 14.4 g/dL      Hematocrit 43.6 %      MCV 93.4 fL      MCH 30.9 pg      MCHC 33.1 g/dL      RDW 15.0 %      RDW-SD 49.4 fl      MPV 8.6 fL      Platelets 200 10*3/mm3     Scan Slide [[id number]] Collected: 12/06/20 0358    Specimen: Blood Updated: 12/06/20 0729     Scan Slide --     Comment: See Manual  Differential Results       Manual Differential [[id number]]  (Abnormal) Collected: 12/06/20 0358    Specimen: Blood Updated: 12/06/20 0729     Neutrophil % 82.0 %      Lymphocyte % 6.0 %      Monocyte % 4.0 %      Bands %  7.0 %      Myelocyte % 1.0 %      Neutrophils Absolute 7.21 10*3/mm3      Lymphocytes Absolute 0.49 10*3/mm3      Monocytes Absolute 0.32 10*3/mm3      Anisocytosis Slight/1+     Toxic Granulation Slight/1+     Vacuolated Neutrophils Slight/1+     Platelet Morphology Normal    Lactate Dehydrogenase [[id number]]  (Abnormal) Collected: 12/06/20 0358    Specimen: Blood Updated: 12/06/20 0702      U/L      Comment: Specimen hemolyzed.  Results may be affected.       Comprehensive Metabolic Panel [[id number]]  (Abnormal) Collected: 12/06/20 0358    Specimen: Blood Updated: 12/06/20 0643     Glucose 88 mg/dL      BUN 40 mg/dL      Creatinine 0.94 mg/dL      Sodium 136 mmol/L      Potassium 5.0 mmol/L      Comment: Slight hemolysis detected by analyzer. Results may be affected.        Chloride 101 mmol/L      CO2 24.0 mmol/L      Calcium 9.3 mg/dL      Total Protein 7.0 g/dL      Albumin 3.30 g/dL      ALT (SGPT) 56 U/L      AST (SGOT) 43 U/L      Alkaline Phosphatase 104 U/L      Total Bilirubin 0.8 mg/dL      eGFR Non African Amer 80 mL/min/1.73      Globulin 3.7 gm/dL      A/G Ratio 0.9 g/dL      BUN/Creatinine Ratio 42.6     Anion Gap 11.0 mmol/L     Narrative:      GFR Normal >60  Chronic Kidney Disease <60  Kidney Failure <15      CK [[id number]]  (Normal) Collected: 12/06/20 0358    Specimen: Blood Updated: 12/06/20 0643     Creatine Kinase 72 U/L     C-reactive Protein [[id number]]  (Abnormal) Collected: 12/06/20 0358    Specimen: Blood Updated: 12/06/20 0643     C-Reactive Protein 2.40 mg/dL     Magnesium [[id number]]  (Normal) Collected: 12/06/20 0358    Specimen: Blood Updated: 12/06/20 0643     Magnesium 2.1 mg/dL     Ferritin [[id number]]  (Normal) Collected: 12/06/20 0358    Specimen: Blood  Updated: 12/06/20 0636     Ferritin 235.00 ng/mL     Narrative:      Results may be falsely decreased if patient taking Biotin.      D-dimer, Quantitative [[id number]]  (Abnormal) Collected: 12/06/20 0358    Specimen: Blood Updated: 12/06/20 0628     D-Dimer, Quantitative 1.19 mg/L (FEU)     Narrative:      Reference Range  --------------------------------------------------------------------     < 0.50   Negative Predictive Value  0.50-0.59   Indeterminate    >= 0.60   Probable VTE             A very low percentage of patients with DVT may yield D-Dimer results   below the cut-off of 0.50 mg/L FEU.  This is known to be more   prevalent in patients with distal DVT.             Results of this test should always be interpreted in conjunction with   the patient's medical history, clinical presentation and other   findings.  Clinical diagnosis should not be based on the result of   INNOVANCE D-Dimer alone.    Fibrinogen [[id number]]  (Abnormal) Collected: 12/06/20 0358    Specimen: Blood Updated: 12/06/20 0628     Fibrinogen 543 mg/dL     Blood Culture - Blood, Arm, Right [[id number]] Collected: 12/01/20 0020    Specimen: Blood from Arm, Right Updated: 12/06/20 0030     Blood Culture No growth at 5 days        No results found for: HGBA1C  Results from last 7 days   Lab Units 12/01/20  1102 12/01/20  0020   INR  1.05 1.17*       Results from last 7 days   Lab Units 12/01/20  0027   PH, ARTERIAL pH units 7.396   PO2 ART mm Hg 64.2*   PCO2, ARTERIAL mm Hg 43.2   HCO3 ART mmol/L 26.5     No results found for: LIPASE  No results found for: CHOL, CHLPL, TRIG, HDL, LDL, LDLDIRECT    No results found for: INTRAOP, PREDX, FINALDX, COMDX    Microbiology Results (last 10 days)     Procedure Component Value - Date/Time    Respiratory Panel PCR w/COVID-19(SARS-CoV-2) ARNAV/PURNIMA/HOLLY/PAD/COR/MAD/GLADYS In-House, NP Swab in UTM/VTM, 3-4 HR TAT - Swab, Nasopharynx [[id number]]  (Abnormal) Collected: 12/02/20 0548    Lab Status: Final result  Specimen: Swab from Nasopharynx Updated: 12/02/20 0659     ADENOVIRUS, PCR Not Detected     Coronavirus 229E Not Detected     Coronavirus HKU1 Not Detected     Coronavirus NL63 Not Detected     Coronavirus OC43 Not Detected     COVID19 Detected     Human Metapneumovirus Not Detected     Human Rhinovirus/Enterovirus Not Detected     Influenza A PCR Not Detected     Influenza B PCR Not Detected     Parainfluenza Virus 1 Not Detected     Parainfluenza Virus 2 Not Detected     Parainfluenza Virus 3 Not Detected     Parainfluenza Virus 4 Not Detected     RSV, PCR Not Detected     Bordetella pertussis pcr Not Detected     Bordetella parapertussis PCR Not Detected     Chlamydophila pneumoniae PCR Not Detected     Mycoplasma pneumo by PCR Not Detected    Narrative:      Fact sheet for providers: https://docs.[employer]/wp-content/uploads/QFZ1047-3092-WY6.1-EUA-Provider-Fact-Sheet-3.pdf    Fact sheet for patients: https://docs.[employer]/wp-content/uploads/XFJ9760-8053-WQ7.1-EUA-Patient-Fact-Sheet-1.pdf    Respiratory Panel, PCR (WITHOUT COVID) - Swab, Nasopharynx [[id number]]  (Normal) Collected: 12/01/20 0507    Lab Status: Final result Specimen: Swab from Nasopharynx Updated: 12/01/20 0704     ADENOVIRUS, PCR Not Detected     Coronavirus 229E Not Detected     Coronavirus HKU1 Not Detected     Coronavirus NL63 Not Detected     Coronavirus OC43 Not Detected     Human Metapneumovirus Not Detected     Human Rhinovirus/Enterovirus Not Detected     Influenza B PCR Not Detected     Parainfluenza Virus 1 Not Detected     Parainfluenza Virus 2 Not Detected     Parainfluenza Virus 3 Not Detected     Parainfluenza Virus 4 Not Detected     Bordetella pertussis pcr Not Detected     Influenza A H1 2009 PCR Not Detected     Chlamydophila pneumoniae PCR Not Detected     Mycoplasma pneumo by PCR Not Detected     Influenza A PCR Not Detected     Influenza A H3 Not Detected     Influenza A H1 Not Detected     RSV, PCR Not Detected      Bordetella parapertussis PCR Not Detected    Narrative:      The coronavirus on the RVP is NOT COVID-19 and is NOT indicative of infection with COVID-19.     Blood Culture - Blood, Arm, Right [[id number]] Collected: 12/01/20 0020    Lab Status: Final result Specimen: Blood from Arm, Right Updated: 12/06/20 0030     Blood Culture No growth at 5 days    Blood Culture - Blood, Arm, Left [[id number]]  (Abnormal) Collected: 12/01/20 0020    Lab Status: Final result Specimen: Blood from Arm, Left Updated: 12/02/20 1309     Blood Culture Staphylococcus, coagulase negative     Comment: Probable contaminant requires clinical correlation, susceptibility not performed unless requested by physician.          Isolated from Aerobic Bottle     Gram Stain Aerobic Bottle Gram positive cocci in clusters    Blood Culture ID, PCR - Blood, Arm, Left [[id number]]  (Abnormal) Collected: 12/01/20 0020    Lab Status: Final result Specimen: Blood from Arm, Left Updated: 12/01/20 2241     BCID, PCR Staphylococcus spp, not aureus. Identification by BCID PCR.          ECG/EMG Results (most recent)     Procedure Component Value Units Date/Time    ECG 12 Lead [[id number]]  (Abnormal) Resulted: 12/01/20 0256     Updated: 12/01/20 0256    ECG 12 Lead [[id number]] Collected: 12/01/20 0012     Updated: 12/01/20 1524     QT Interval 447 ms     Narrative:      HEART RATE= 91  bpm  RR Interval= 659  ms  IA Interval=   ms  P Horizontal Axis=   deg  P Front Axis=   deg  QRSD Interval= 93  ms  QT Interval= 447  ms  QRS Axis= -36  deg  T Wave Axis= -23  deg  - ABNORMAL ECG -  Atrial fibrillation  Inferior infarct, age indeterminate  Anterior infarct, old  Prolonged QT interval  No previous ECG available for comparison  Electronically Signed By: Ryne Portillo) 01-Dec-2020 15:22:22  Date and Time of Study: 2020-12-01 00:12:20    ECG 12 Lead [[id number]] Collected: 12/04/20 0525     Updated: 12/04/20 0527     QT Interval 391 ms     Narrative:      HEART RATE= 75   bpm  RR Interval= 801  ms  MD Interval=   ms  P Horizontal Axis=   deg  P Front Axis=   deg  QRSD Interval= 97  ms  QT Interval= 391  ms  QRS Axis= -37  deg  T Wave Axis= -5  deg  - ABNORMAL ECG -  Atrial fibrillation  Inferior infarct, old  Anterior infarct, old  Electronically Signed By:   Date and Time of Study: 2020-12-04 05:25:33                    Xr Chest 1 View    Result Date: 12/1/2020  Diffuse bilateral infiltrates consistent with multifocal pneumonia. Electronically signed by:  Naveed Welch M.D.  12/1/2020 12:26 AM    Ct Chest Pulmonary Embolism    Result Date: 12/1/2020  1. Likely subacute pulmonary embolus within the distal main pulmonary artery on the left extending into the interlobar artery of the left lower lobe. 2. No evidence of thoracic aortic aneurysm or dissection. 3. Findings throughout the lungs likely related to a Covid pneumonia given the patient's clinical history. Follow-up to clearing recommended. Likely area of rounded atelectasis, left lower lobe. 4. Emphysema. These critical findings were discussed with JT Kimball Electronically signed by:  Costa Lou D.O.  12/1/2020 12:34 AM          Xrays, labs reviewed personally by physician.    Medication Review:   I have reviewed the patient's current medication list      Scheduled Meds  Acetylcysteine, 600 mg, Oral, BID  albuterol sulfate HFA, 2 puff, Inhalation, 4x Daily - RT  ammonium lactate, , Topical, Q12H  apixaban, 10 mg, Oral, BID    Followed by  [START ON 12/10/2020] apixaban, 5 mg, Oral, BID  atorvastatin, 40 mg, Oral, Daily  budesonide-formoterol, 2 puff, Inhalation, BID - RT  bumetanide, 1 mg, Oral, Daily  busPIRone, 15 mg, Oral, TID  cetirizine, 10 mg, Oral, Nightly  cloNIDine, 0.1 mg, Oral, Q12H  cyclobenzaprine, 5 mg, Oral, TID  dexamethasone, 6 mg, Intravenous, Daily  dilTIAZem CD, 120 mg, Oral, Daily  docusate sodium, 100 mg, Oral, TID  doxycycline, 100 mg, Oral, Q12H  escitalopram, 5 mg, Oral, Daily  guaiFENesin, 600  mg, Oral, Q12H  lidocaine, 1 patch, Transdermal, Q24H  melatonin, 10 mg, Oral, Nightly  metoclopramide, 10 mg, Oral, TID AC  montelukast, 10 mg, Oral, Nightly  pantoprazole, 40 mg, Oral, Q AM  potassium chloride, 20 mEq, Oral, Daily With Lunch  simethicone, 120 mg, Oral, TID  sodium chloride, 10 mL, Intravenous, Q12H  tamsulosin, 0.4 mg, Oral, Daily  thiamine, 200 mg, Oral, Daily  traZODone, 75 mg, Oral, Nightly  vitamin C, 1,000 mg, Oral, Daily  vitamin D3, 5,000 Units, Oral, Daily  zinc sulfate, 220 mg, Oral, BID        Meds Infusions  Pharmacy Consult - Pharmacy to dose,         Meds PRN  •  acetaminophen **OR** acetaminophen **OR** acetaminophen  •  acetaminophen  •  benzonatate  •  bismuth subsalicylate  •  fleet enema  •  HYDROcodone-acetaminophen  •  lactulose  •  magnesium sulfate **OR** magnesium sulfate in D5W 1g/100mL (PREMIX)  •  melatonin  •  nitroglycerin  •  ondansetron **OR** ondansetron  •  Pharmacy Consult - Pharmacy to dose  •  polyethylene glycol  •  potassium chloride **OR** potassium chloride **OR** potassium chloride  •  sodium chloride  •  sodium chloride  •  sodium chloride  •  witch hazel-glycerin        Assessment/Plan   Assessment/Plan     Active Hospital Problems    Diagnosis  POA   • **Acute respiratory failure due to COVID-19 (CMS/[facility]) [U07.1, J96.00]  Yes   • Pneumonia of both lungs due to infectious organism [J18.9]  Unknown   • Subacute pulmonary embolism (CMS/[facility]) [I26.99]  Unknown   • Renal disease [N28.9]  Yes   • CAD (coronary artery disease) [I25.10]  Yes   • Hypertension [I10]  Yes   • Stroke (CMS/[facility]) [I63.9]  Yes   • Mood disorder (CMS/[facility]) [F39]  Yes   • GERD (gastroesophageal reflux disease) [K21.9]  Yes   • Afib (CMS/[facility]) [I48.91]  Yes   • Insomnia [G47.00]  Yes   • BPH (benign prostatic hyperplasia) [N40.0]  Yes      Resolved Hospital Problems   No resolved problems to display.       MEDICAL DECISION MAKING COMPLEXITY BY PROBLEM:        Acute respiratory failure due to  Covid-19, Pneumonia due to Covid-19  -ABG: showed ph 7.39, CO2 43.2, O2 64, HCO3 26.5 on 100% non-rebreather  -currently on Bipap at 100%  -CT PE protocol: likely subacute pulmonary embolus within the distal main pulmonary artery on the left extending into the interlobar artery of the left lower lobe. Findings throughout the lungs likely related to Covid pneumonia. Emphysema  -, ferritin normal, procalcitonin 0.39, d-dimer 0.62, WBC normal, temp 99.2  -given IV rocephin, zithromax, and decadron in the ER  On Decadron and Remdesivir  De-escalate antibiotics.  Now on doxycycline  On Bumex  Appreciate pulmonary input    Acute pulmonary embolus  -CT PE protocol as above  -Initially received heparin drip  Switch to Eliquis.      Positive blood culture likely contamination with coagulase-negative staph x1    CKD  -creatinine 1.31  -monitor BMP     COPD with acute exacerbation  -on oxygen at night at baseline  -tx as above     Chronic atrial fibrillation  -cont home cardizem  Now on Eliquis.  Discontinue Xarelto on discharge     Hypertension  -cont home cardizem,   Decrease dose of clonidine  Holding losartan     Previous CVA  -on chronic anticoagulation,   On statin       Depression  -cont home buspar, lexapro     Chronic pain  -on norco (Verified by Rx by [facility])     Chronic constipation  -prn laxatives      BPH  -cont home flomax        VTE Prophylaxis - switch to Eliquis       VTE Prophylaxis -   Mechanical Order History:     None      Pharmalogical Order History:      Ordered     Dose Route Frequency Stop    12/01/20 0309  enoxaparin (LOVENOX) syringe 40 mg  Status:  Discontinued      40 mg SC Every 24 Hours 12/01/20 1041    12/01/20 1042  heparin 42182 units/250 mL (100 units/mL) in 0.45 % NaCl infusion  14.97 mL/hr      14.4 Units/kg/hr IV Titrated --    12/01/20 1042  heparin bolus from bag 4,200 Units      40 Units/kg IV Every 6 Hours PRN --    12/01/20 1042  heparin bolus from bag 8,300 Units      80  Units/kg IV Every 6 Hours PRN --    12/01/20 0238  enoxaparin (LOVENOX) syringe 100 mg  Status:  Discontinued      1 mg/kg SC Once 12/01/20 0239                  Code Status -   Code Status and Medical Interventions:   Ordered at: 12/01/20 0308     Code Status:    CPR     Medical Interventions (Level of Support Prior to Arrest):    Full       This patient has been examined wearing appropriate Personal Protective Equipment and discussed with hospital infection control department. 12/06/20        Discharge Planning  nh        Electronically signed by Mono Estes MD, 12/06/20, 13:21 EST.  [facility] Hospitalist Team

## 2020-12-06 NOTE — PROGRESS NOTES
Daily Progress Note        Acute respiratory failure due to COVID-19 (CMS/HCC)    Pneumonia of both lungs due to infectious organism    Subacute pulmonary embolism (CMS/HCC)    Renal disease    CAD (coronary artery disease)    Hypertension    Stroke (CMS/HCC)    Mood disorder (CMS/HCC)    GERD (gastroesophageal reflux disease)    Afib (CMS/HCC)    Insomnia    BPH (benign prostatic hyperplasia)      Assessment:  Acute hypoxic respiratory failure secondary to COVID-19 infection  CT scan possible subacute distal PE, nonobstructing patient is on Xarelto 20 mg as an outpatient for A. Fib  Acute COPD exacerbation  A. fib  History of CVA  CKD     Recommendations:   oxygen titration precision flow   Remdesivir  Decadron  Empiric antibiotics doxycycline   Diuresis currently on Bumex 1 mg daily, home medication  Anti-inflammatory agents will include vitamin C vitamin D zinc and Mucomyst p.o.  Continue anticoagulation with heparin drip may switch to Eliquis or Xarelto  Aspirin            LOS: 3 days     Subjective         Objective     Vital signs for last 24 hours:  Vitals:    12/05/20 1854 12/05/20 1855 12/05/20 1857 12/05/20 2059   BP:    143/95   BP Location:       Patient Position:       Pulse: 86 86 84 89   Resp:  20     Temp:       TempSrc:       SpO2: 95% 95% 95%    Weight:       Height:           Intake/Output last 3 shifts:  I/O last 3 completed shifts:  In: 600 [P.O.:600]  Out: 3850 [Urine:3850]  Intake/Output this shift:  No intake/output data recorded.      Radiology  Imaging Results (Last 24 Hours)     ** No results found for the last 24 hours. **          Labs:  Results from last 7 days   Lab Units 12/05/20  0722   WBC 10*3/mm3 7.50   HEMOGLOBIN g/dL 12.5*   HEMATOCRIT % 39.2   PLATELETS 10*3/mm3 262     Results from last 7 days   Lab Units 12/05/20  0722   SODIUM mmol/L 141   POTASSIUM mmol/L 4.6   CHLORIDE mmol/L 106   CO2 mmol/L 27.0   BUN mg/dL 38*   CREATININE mg/dL 1.06   CALCIUM mg/dL 8.9   BILIRUBIN  mg/dL 0.7   ALK PHOS U/L 89   ALT (SGPT) U/L 39   AST (SGOT) U/L 33   GLUCOSE mg/dL 114*     Results from last 7 days   Lab Units 12/01/20  0027   PH, ARTERIAL pH units 7.396   PO2 ART mm Hg 64.2*   PCO2, ARTERIAL mm Hg 43.2   HCO3 ART mmol/L 26.5     Results from last 7 days   Lab Units 12/05/20  0722 12/04/20  0240 12/03/20  0519   ALBUMIN g/dL 3.20* 3.00* 3.00*     Results from last 7 days   Lab Units 12/05/20  0722 12/04/20  0525 12/04/20  0240 12/03/20  0519  12/01/20  0453 12/01/20  0141   CK TOTAL U/L 61  --  52 59   < >  --   --    TROPONIN T ng/mL  --  <0.010  --   --   --  <0.010 0.011    < > = values in this interval not displayed.         Results from last 7 days   Lab Units 12/05/20 0722   MAGNESIUM mg/dL 1.9     Results from last 7 days   Lab Units 12/03/20  0519 12/02/20  2019 12/02/20  0804  12/01/20  1102 12/01/20  0020   INR   --   --   --   --  1.05 1.17*   APTT seconds 105.7* 54.4* >139.0*   < > 31.9* 34.2*    < > = values in this interval not displayed.               Meds:   SCHEDULE  Acetylcysteine, 600 mg, Oral, BID  albuterol sulfate HFA, 2 puff, Inhalation, 4x Daily - RT  ammonium lactate, , Topical, Q12H  apixaban, 10 mg, Oral, BID    Followed by  [START ON 12/10/2020] apixaban, 5 mg, Oral, BID  atorvastatin, 40 mg, Oral, Daily  budesonide-formoterol, 2 puff, Inhalation, BID - RT  bumetanide, 1 mg, Oral, Daily  busPIRone, 15 mg, Oral, TID  cetirizine, 10 mg, Oral, Nightly  cloNIDine, 0.1 mg, Oral, Q12H  cyclobenzaprine, 5 mg, Oral, TID  dexamethasone, 6 mg, Intravenous, Daily  dilTIAZem CD, 120 mg, Oral, Daily  docusate sodium, 100 mg, Oral, TID  doxycycline, 100 mg, Oral, Q12H  escitalopram, 5 mg, Oral, Daily  guaiFENesin, 600 mg, Oral, Q12H  lidocaine, 1 patch, Transdermal, Q24H  melatonin, 10 mg, Oral, Nightly  metoclopramide, 10 mg, Oral, TID AC  montelukast, 10 mg, Oral, Nightly  pantoprazole, 40 mg, Oral, Q AM  potassium chloride, 20 mEq, Oral, Daily With Lunch  simethicone, 120 mg,  Oral, TID  sodium chloride, 10 mL, Intravenous, Q12H  tamsulosin, 0.4 mg, Oral, Daily  thiamine, 200 mg, Oral, Daily  traZODone, 75 mg, Oral, Nightly  vitamin C, 1,000 mg, Oral, Daily  vitamin D3, 5,000 Units, Oral, Daily  zinc sulfate, 220 mg, Oral, BID      Infusions  Pharmacy Consult - Pharmacy to dose,       PRNs  •  acetaminophen **OR** acetaminophen **OR** acetaminophen  •  acetaminophen  •  benzonatate  •  bismuth subsalicylate  •  fleet enema  •  HYDROcodone-acetaminophen  •  lactulose  •  magnesium sulfate **OR** magnesium sulfate in D5W 1g/100mL (PREMIX)  •  melatonin  •  nitroglycerin  •  ondansetron **OR** ondansetron  •  Pharmacy Consult - Pharmacy to dose  •  polyethylene glycol  •  potassium chloride **OR** potassium chloride **OR** potassium chloride  •  sodium chloride  •  sodium chloride  •  sodium chloride  •  witch hazel-glycerin    Physical Exam:  Physical Exam  Cardiovascular:      Heart sounds: Murmur present.   Pulmonary:      Breath sounds: Rhonchi and rales present.         ROS  Review of Systems   Respiratory: Positive for cough and shortness of breath.    Cardiovascular: Positive for palpitations and leg swelling.             Total time spent with patient greater than: 45 Minutes

## 2020-12-06 NOTE — PLAN OF CARE
Problem: Adult Inpatient Plan of Care  Goal: Plan of Care Review  Outcome: Ongoing, Progressing  Flowsheets  Taken 12/6/2020 1152 by Ysabel Webb, RN  Outcome Summary: Pt on PF 25/60. Bipap while sleeping. No distress noted. Will continue to monitor.  Taken 12/4/2020 1758 by Acacia Jacobson, RN  Plan of Care Reviewed With: patient   Goal Outcome Evaluation:  Plan of Care Reviewed With: patient  Progress: improving  Outcome Summary: Pt on PF 25/60. Bipap while sleeping. No distress noted. Will continue to monitor.

## 2020-12-07 LAB
ALBUMIN SERPL-MCNC: 3.5 G/DL (ref 3.5–5.2)
ALBUMIN/GLOB SERPL: 1.1 G/DL
ALP SERPL-CCNC: 120 U/L (ref 39–117)
ALT SERPL W P-5'-P-CCNC: 60 U/L (ref 1–41)
ANION GAP SERPL CALCULATED.3IONS-SCNC: 11 MMOL/L (ref 5–15)
AST SERPL-CCNC: 35 U/L (ref 1–40)
BILIRUB SERPL-MCNC: 0.6 MG/DL (ref 0–1.2)
BUN SERPL-MCNC: 44 MG/DL (ref 8–23)
BUN/CREAT SERPL: 42.7 (ref 7–25)
CALCIUM SPEC-SCNC: 9 MG/DL (ref 8.6–10.5)
CHLORIDE SERPL-SCNC: 103 MMOL/L (ref 98–107)
CK SERPL-CCNC: 57 U/L (ref 20–200)
CO2 SERPL-SCNC: 24 MMOL/L (ref 22–29)
CREAT SERPL-MCNC: 1.03 MG/DL (ref 0.76–1.27)
DACRYOCYTES BLD QL SMEAR: ABNORMAL
DEPRECATED RDW RBC AUTO: 50.8 FL (ref 37–54)
ERYTHROCYTE [DISTWIDTH] IN BLOOD BY AUTOMATED COUNT: 15.2 % (ref 12.3–15.4)
FERRITIN SERPL-MCNC: 246.5 NG/ML (ref 30–400)
GFR SERPL CREATININE-BSD FRML MDRD: 72 ML/MIN/1.73
GIANT PLATELETS: ABNORMAL
GLOBULIN UR ELPH-MCNC: 3.3 GM/DL
GLUCOSE SERPL-MCNC: 134 MG/DL (ref 65–99)
HCT VFR BLD AUTO: 44.6 % (ref 37.5–51)
HGB BLD-MCNC: 14.6 G/DL (ref 13–17.7)
HOLD SPECIMEN: NORMAL
LARGE PLATELETS: ABNORMAL
LYMPHOCYTES # BLD MANUAL: 0.85 10*3/MM3 (ref 0.7–3.1)
LYMPHOCYTES NFR BLD MANUAL: 1 % (ref 5–12)
LYMPHOCYTES NFR BLD MANUAL: 10 % (ref 19.6–45.3)
MAGNESIUM SERPL-MCNC: 2.1 MG/DL (ref 1.6–2.4)
MCH RBC QN AUTO: 30.8 PG (ref 26.6–33)
MCHC RBC AUTO-ENTMCNC: 32.8 G/DL (ref 31.5–35.7)
MCV RBC AUTO: 93.9 FL (ref 79–97)
MONOCYTES # BLD AUTO: 0.09 10*3/MM3 (ref 0.1–0.9)
NEUTROPHILS # BLD AUTO: 7.57 10*3/MM3 (ref 1.7–7)
NEUTROPHILS NFR BLD MANUAL: 87 % (ref 42.7–76)
NEUTS BAND NFR BLD MANUAL: 2 % (ref 0–5)
PLATELET # BLD AUTO: 272 10*3/MM3 (ref 140–450)
PMV BLD AUTO: 8.6 FL (ref 6–12)
POIKILOCYTOSIS BLD QL SMEAR: ABNORMAL
POTASSIUM SERPL-SCNC: 4.8 MMOL/L (ref 3.5–5.2)
PROT SERPL-MCNC: 6.8 G/DL (ref 6–8.5)
RBC # BLD AUTO: 4.74 10*6/MM3 (ref 4.14–5.8)
SCAN SLIDE: NORMAL
SMALL PLATELETS BLD QL SMEAR: ADEQUATE
SODIUM SERPL-SCNC: 138 MMOL/L (ref 136–145)
WBC # BLD AUTO: 8.5 10*3/MM3 (ref 3.4–10.8)
WBC MORPH BLD: NORMAL

## 2020-12-07 PROCEDURE — 25010000002 DEXAMETHASONE PER 1 MG: Performed by: INTERNAL MEDICINE

## 2020-12-07 PROCEDURE — 86140 C-REACTIVE PROTEIN: CPT | Performed by: STUDENT IN AN ORGANIZED HEALTH CARE EDUCATION/TRAINING PROGRAM

## 2020-12-07 PROCEDURE — 85025 COMPLETE CBC W/AUTO DIFF WBC: CPT | Performed by: STUDENT IN AN ORGANIZED HEALTH CARE EDUCATION/TRAINING PROGRAM

## 2020-12-07 PROCEDURE — 82550 ASSAY OF CK (CPK): CPT | Performed by: STUDENT IN AN ORGANIZED HEALTH CARE EDUCATION/TRAINING PROGRAM

## 2020-12-07 PROCEDURE — 94799 UNLISTED PULMONARY SVC/PX: CPT

## 2020-12-07 PROCEDURE — 83735 ASSAY OF MAGNESIUM: CPT | Performed by: STUDENT IN AN ORGANIZED HEALTH CARE EDUCATION/TRAINING PROGRAM

## 2020-12-07 PROCEDURE — 80053 COMPREHEN METABOLIC PANEL: CPT | Performed by: STUDENT IN AN ORGANIZED HEALTH CARE EDUCATION/TRAINING PROGRAM

## 2020-12-07 PROCEDURE — 85007 BL SMEAR W/DIFF WBC COUNT: CPT | Performed by: STUDENT IN AN ORGANIZED HEALTH CARE EDUCATION/TRAINING PROGRAM

## 2020-12-07 PROCEDURE — 82728 ASSAY OF FERRITIN: CPT | Performed by: STUDENT IN AN ORGANIZED HEALTH CARE EDUCATION/TRAINING PROGRAM

## 2020-12-07 PROCEDURE — 99232 SBSQ HOSP IP/OBS MODERATE 35: CPT | Performed by: HOSPITALIST

## 2020-12-07 RX ADMIN — BUSPIRONE HYDROCHLORIDE 15 MG: 10 TABLET ORAL at 16:19

## 2020-12-07 RX ADMIN — DOCUSATE SODIUM 100 MG: 100 CAPSULE, LIQUID FILLED ORAL at 20:33

## 2020-12-07 RX ADMIN — CYCLOBENZAPRINE HYDROCHLORIDE 5 MG: 10 TABLET, FILM COATED ORAL at 16:20

## 2020-12-07 RX ADMIN — HYDROCODONE BITARTRATE AND ACETAMINOPHEN 1 TABLET: 5; 325 TABLET ORAL at 23:31

## 2020-12-07 RX ADMIN — GUAIFENESIN 600 MG: 600 TABLET, EXTENDED RELEASE ORAL at 09:09

## 2020-12-07 RX ADMIN — METOCLOPRAMIDE 10 MG: 10 TABLET ORAL at 09:10

## 2020-12-07 RX ADMIN — SIMETHICONE 120 MG: 80 TABLET, CHEWABLE ORAL at 16:19

## 2020-12-07 RX ADMIN — LIDOCAINE 1 PATCH: 50 PATCH TOPICAL at 09:12

## 2020-12-07 RX ADMIN — Medication 10 MG: at 20:34

## 2020-12-07 RX ADMIN — TAMSULOSIN HYDROCHLORIDE 0.4 MG: 0.4 CAPSULE ORAL at 09:11

## 2020-12-07 RX ADMIN — CLONIDINE HYDROCHLORIDE 0.1 MG: 0.1 TABLET ORAL at 20:35

## 2020-12-07 RX ADMIN — APIXABAN 10 MG: 5 TABLET, FILM COATED ORAL at 09:11

## 2020-12-07 RX ADMIN — BUSPIRONE HYDROCHLORIDE 15 MG: 10 TABLET ORAL at 20:38

## 2020-12-07 RX ADMIN — PANTOPRAZOLE SODIUM 40 MG: 40 TABLET, DELAYED RELEASE ORAL at 05:29

## 2020-12-07 RX ADMIN — ALBUTEROL SULFATE 2 PUFF: 108 AEROSOL, METERED RESPIRATORY (INHALATION) at 06:56

## 2020-12-07 RX ADMIN — BUDESONIDE AND FORMOTEROL FUMARATE DIHYDRATE 2 PUFF: 160; 4.5 AEROSOL RESPIRATORY (INHALATION) at 06:56

## 2020-12-07 RX ADMIN — BUMETANIDE 1 MG: 1 TABLET ORAL at 09:09

## 2020-12-07 RX ADMIN — Medication 10 ML: at 20:40

## 2020-12-07 RX ADMIN — Medication 200 MG: at 09:09

## 2020-12-07 RX ADMIN — DOCUSATE SODIUM 100 MG: 100 CAPSULE, LIQUID FILLED ORAL at 09:10

## 2020-12-07 RX ADMIN — Medication 600 MG: at 20:35

## 2020-12-07 RX ADMIN — METOCLOPRAMIDE 10 MG: 10 TABLET ORAL at 11:45

## 2020-12-07 RX ADMIN — ZINC SULFATE 220 MG (50 MG) CAPSULE 220 MG: CAPSULE at 20:36

## 2020-12-07 RX ADMIN — SIMETHICONE 120 MG: 80 TABLET, CHEWABLE ORAL at 20:33

## 2020-12-07 RX ADMIN — Medication 600 MG: at 09:09

## 2020-12-07 RX ADMIN — SIMETHICONE 120 MG: 80 TABLET, CHEWABLE ORAL at 09:09

## 2020-12-07 RX ADMIN — ZINC SULFATE 220 MG (50 MG) CAPSULE 220 MG: CAPSULE at 12:59

## 2020-12-07 RX ADMIN — ATORVASTATIN CALCIUM 40 MG: 40 TABLET, FILM COATED ORAL at 09:10

## 2020-12-07 RX ADMIN — HYDROCODONE BITARTRATE AND ACETAMINOPHEN 1 TABLET: 5; 325 TABLET ORAL at 09:22

## 2020-12-07 RX ADMIN — TRAZODONE HYDROCHLORIDE 75 MG: 50 TABLET ORAL at 20:37

## 2020-12-07 RX ADMIN — ESCITALOPRAM OXALATE 5 MG: 10 TABLET ORAL at 09:11

## 2020-12-07 RX ADMIN — MONTELUKAST 10 MG: 10 TABLET, FILM COATED ORAL at 20:38

## 2020-12-07 RX ADMIN — CLONIDINE HYDROCHLORIDE 0.1 MG: 0.1 TABLET ORAL at 09:09

## 2020-12-07 RX ADMIN — DILTIAZEM HYDROCHLORIDE 120 MG: 120 CAPSULE, COATED, EXTENDED RELEASE ORAL at 09:09

## 2020-12-07 RX ADMIN — Medication: at 09:11

## 2020-12-07 RX ADMIN — CYCLOBENZAPRINE HYDROCHLORIDE 5 MG: 10 TABLET, FILM COATED ORAL at 09:08

## 2020-12-07 RX ADMIN — POTASSIUM CHLORIDE 20 MEQ: 1500 TABLET, EXTENDED RELEASE ORAL at 11:45

## 2020-12-07 RX ADMIN — BUDESONIDE AND FORMOTEROL FUMARATE DIHYDRATE 2 PUFF: 160; 4.5 AEROSOL RESPIRATORY (INHALATION) at 20:11

## 2020-12-07 RX ADMIN — CYCLOBENZAPRINE HYDROCHLORIDE 5 MG: 10 TABLET, FILM COATED ORAL at 20:32

## 2020-12-07 RX ADMIN — Medication 1 APPLICATION: at 20:40

## 2020-12-07 RX ADMIN — APIXABAN 10 MG: 5 TABLET, FILM COATED ORAL at 20:36

## 2020-12-07 RX ADMIN — DOXYCYCLINE 100 MG: 100 TABLET, FILM COATED ORAL at 09:09

## 2020-12-07 RX ADMIN — Medication 10 ML: at 09:11

## 2020-12-07 RX ADMIN — ALBUTEROL SULFATE 2 PUFF: 108 AEROSOL, METERED RESPIRATORY (INHALATION) at 15:17

## 2020-12-07 RX ADMIN — DOCUSATE SODIUM 100 MG: 100 CAPSULE, LIQUID FILLED ORAL at 16:20

## 2020-12-07 RX ADMIN — OXYCODONE HYDROCHLORIDE AND ACETAMINOPHEN 1000 MG: 500 TABLET ORAL at 09:09

## 2020-12-07 RX ADMIN — BUSPIRONE HYDROCHLORIDE 15 MG: 10 TABLET ORAL at 09:10

## 2020-12-07 RX ADMIN — METOCLOPRAMIDE 10 MG: 10 TABLET ORAL at 16:20

## 2020-12-07 RX ADMIN — GUAIFENESIN 600 MG: 600 TABLET, EXTENDED RELEASE ORAL at 20:35

## 2020-12-07 RX ADMIN — ALBUTEROL SULFATE 2 PUFF: 108 AEROSOL, METERED RESPIRATORY (INHALATION) at 11:21

## 2020-12-07 RX ADMIN — Medication 5000 UNITS: at 09:09

## 2020-12-07 RX ADMIN — DOXYCYCLINE 100 MG: 100 TABLET, FILM COATED ORAL at 20:35

## 2020-12-07 RX ADMIN — ALBUTEROL SULFATE 2 PUFF: 108 AEROSOL, METERED RESPIRATORY (INHALATION) at 20:11

## 2020-12-07 RX ADMIN — DEXAMETHASONE SODIUM PHOSPHATE 6 MG: 4 INJECTION, SOLUTION INTRAMUSCULAR; INTRAVENOUS at 10:52

## 2020-12-07 RX ADMIN — HYDROCODONE BITARTRATE AND ACETAMINOPHEN 1 TABLET: 5; 325 TABLET ORAL at 16:21

## 2020-12-07 RX ADMIN — CETIRIZINE HYDROCHLORIDE 10 MG: 10 TABLET, FILM COATED ORAL at 20:37

## 2020-12-07 NOTE — PLAN OF CARE
Goal Outcome Evaluation:  Plan of Care Reviewed With: patient  Progress: no change  Outcome Summary: Patient weined down to 20 liters at 50 percent and tolerating. Patient continues to seek attention and has been on his call light frequently. He states he is lonely. Continue to monitor

## 2020-12-07 NOTE — PROGRESS NOTES
Daily Progress Note        Acute respiratory failure due to COVID-19 (CMS/HCC)    Pneumonia of both lungs due to infectious organism    Subacute pulmonary embolism (CMS/HCC)    Renal disease    CAD (coronary artery disease)    Hypertension    Stroke (CMS/HCC)    Mood disorder (CMS/HCC)    GERD (gastroesophageal reflux disease)    Afib (CMS/HCC)    Insomnia    BPH (benign prostatic hyperplasia)      Assessment:  Acute hypoxic respiratory failure secondary to COVID-19 infection  CT scan possible subacute distal PE, nonobstructing patient is on Xarelto 20 mg as an outpatient for A. Fib  Acute COPD exacerbation  A. fib  History of CVA  CKD     Recommendations:   oxygen titration precision flow   Remdesivir  Decadron  Empiric antibiotics doxycycline   Diuresis currently on Bumex 1 mg daily, home medication  Anti-inflammatory agents will include vitamin C vitamin D zinc and Mucomyst p.o.  Continue anticoagulation with heparin drip may switch to Eliquis or Xarelto  Aspirin            LOS: 4 days     Subjective         Objective     Vital signs for last 24 hours:  Vitals:    12/06/20 1439 12/06/20 1524 12/06/20 1723 12/06/20 1936   BP: 129/70  129/86    BP Location:       Patient Position:       Pulse: 86 90 82 86   Resp: 18 18 27 24   Temp: 97.5 °F (36.4 °C)  97.4 °F (36.3 °C)    TempSrc: Axillary  Oral    SpO2: 100% 100% 95% 95%   Weight:       Height:           Intake/Output last 3 shifts:  I/O last 3 completed shifts:  In: 960 [P.O.:960]  Out: 2475 [Urine:2475]  Intake/Output this shift:  No intake/output data recorded.      Radiology  Imaging Results (Last 24 Hours)     ** No results found for the last 24 hours. **          Labs:  Results from last 7 days   Lab Units 12/06/20  0358   WBC 10*3/mm3 8.10   HEMOGLOBIN g/dL 14.4   HEMATOCRIT % 43.6   PLATELETS 10*3/mm3 200     Results from last 7 days   Lab Units 12/06/20  0358   SODIUM mmol/L 136   POTASSIUM mmol/L 5.0   CHLORIDE mmol/L 101   CO2 mmol/L 24.0   BUN mg/dL  40*   CREATININE mg/dL 0.94   CALCIUM mg/dL 9.3   BILIRUBIN mg/dL 0.8   ALK PHOS U/L 104   ALT (SGPT) U/L 56*   AST (SGOT) U/L 43*   GLUCOSE mg/dL 88     Results from last 7 days   Lab Units 12/01/20  0027   PH, ARTERIAL pH units 7.396   PO2 ART mm Hg 64.2*   PCO2, ARTERIAL mm Hg 43.2   HCO3 ART mmol/L 26.5     Results from last 7 days   Lab Units 12/06/20  0358 12/05/20  0722 12/04/20  0240   ALBUMIN g/dL 3.30* 3.20* 3.00*     Results from last 7 days   Lab Units 12/06/20  0358 12/05/20  0722 12/04/20  0525 12/04/20  0240  12/01/20  0453 12/01/20  0141   CK TOTAL U/L 72 61  --  52   < >  --   --    TROPONIN T ng/mL  --   --  <0.010  --   --  <0.010 0.011    < > = values in this interval not displayed.         Results from last 7 days   Lab Units 12/06/20  0358   MAGNESIUM mg/dL 2.1     Results from last 7 days   Lab Units 12/03/20  0519 12/02/20  2019 12/02/20  0804  12/01/20  1102 12/01/20  0020   INR   --   --   --   --  1.05 1.17*   APTT seconds 105.7* 54.4* >139.0*   < > 31.9* 34.2*    < > = values in this interval not displayed.               Meds:   SCHEDULE  Acetylcysteine, 600 mg, Oral, BID  albuterol sulfate HFA, 2 puff, Inhalation, 4x Daily - RT  ammonium lactate, , Topical, Q12H  apixaban, 10 mg, Oral, BID    Followed by  [START ON 12/10/2020] apixaban, 5 mg, Oral, BID  atorvastatin, 40 mg, Oral, Daily  budesonide-formoterol, 2 puff, Inhalation, BID - RT  bumetanide, 1 mg, Oral, Daily  busPIRone, 15 mg, Oral, TID  cetirizine, 10 mg, Oral, Nightly  cloNIDine, 0.1 mg, Oral, Q12H  cyclobenzaprine, 5 mg, Oral, TID  dexamethasone, 6 mg, Intravenous, Daily  dilTIAZem CD, 120 mg, Oral, Daily  docusate sodium, 100 mg, Oral, TID  doxycycline, 100 mg, Oral, Q12H  escitalopram, 5 mg, Oral, Daily  guaiFENesin, 600 mg, Oral, Q12H  lidocaine, 1 patch, Transdermal, Q24H  melatonin, 10 mg, Oral, Nightly  metoclopramide, 10 mg, Oral, TID AC  montelukast, 10 mg, Oral, Nightly  pantoprazole, 40 mg, Oral, Q AM  potassium  chloride, 20 mEq, Oral, Daily With Lunch  simethicone, 120 mg, Oral, TID  sodium chloride, 10 mL, Intravenous, Q12H  tamsulosin, 0.4 mg, Oral, Daily  thiamine, 200 mg, Oral, Daily  traZODone, 75 mg, Oral, Nightly  vitamin C, 1,000 mg, Oral, Daily  vitamin D3, 5,000 Units, Oral, Daily  zinc sulfate, 220 mg, Oral, BID      Infusions  Pharmacy Consult - Pharmacy to dose,       PRNs  •  acetaminophen **OR** acetaminophen **OR** acetaminophen  •  acetaminophen  •  benzonatate  •  bismuth subsalicylate  •  fleet enema  •  HYDROcodone-acetaminophen  •  lactulose  •  magnesium sulfate **OR** magnesium sulfate in D5W 1g/100mL (PREMIX)  •  melatonin  •  nitroglycerin  •  ondansetron **OR** ondansetron  •  Pharmacy Consult - Pharmacy to dose  •  polyethylene glycol  •  potassium chloride **OR** potassium chloride **OR** potassium chloride  •  sodium chloride  •  sodium chloride  •  sodium chloride  •  witch hazel-glycerin    Physical Exam:  Physical Exam  Cardiovascular:      Heart sounds: Murmur present.   Pulmonary:      Breath sounds: Rhonchi and rales present.         ROS  Review of Systems   Respiratory: Positive for cough and shortness of breath.    Cardiovascular: Positive for palpitations and leg swelling.             Total time spent with patient greater than: 45 Minutes

## 2020-12-07 NOTE — PLAN OF CARE
Goal Outcome Evaluation:  Plan of Care Reviewed With: patient  Progress: no change  Outcome Summary: Pt on 25/60 PF, no distress noted. Very demanding of staff attention, will continue to monitor.

## 2020-12-07 NOTE — PROGRESS NOTES
Continued Stay Note   [place]     Patient Name: Gordon Dowling  MRN: [id number]  Today's Date: 12/7/2020    Admit Date: 12/1/2020    Discharge Plan     Row Name 12/07/20 1629       Plan    Plan  [place] [facility] [place]. Okay to return per facility/pt. No PASRR or pre-cert needed.    Plan Comments  DC Barrier: COVID positive. Pt continues to require 25L 60% precision flow.                Alexandra Coleman RN

## 2020-12-08 LAB
ALBUMIN SERPL-MCNC: 3.4 G/DL (ref 3.5–5.2)
ALBUMIN/GLOB SERPL: 0.9 G/DL
ALP SERPL-CCNC: 116 U/L (ref 39–117)
ALT SERPL W P-5'-P-CCNC: 55 U/L (ref 1–41)
ANION GAP SERPL CALCULATED.3IONS-SCNC: 11 MMOL/L (ref 5–15)
AST SERPL-CCNC: 28 U/L (ref 1–40)
BILIRUB SERPL-MCNC: 0.6 MG/DL (ref 0–1.2)
BUN SERPL-MCNC: 49 MG/DL (ref 8–23)
BUN/CREAT SERPL: 51 (ref 7–25)
CALCIUM SPEC-SCNC: 9.3 MG/DL (ref 8.6–10.5)
CHLORIDE SERPL-SCNC: 102 MMOL/L (ref 98–107)
CK SERPL-CCNC: 54 U/L (ref 20–200)
CO2 SERPL-SCNC: 22 MMOL/L (ref 22–29)
CREAT SERPL-MCNC: 0.96 MG/DL (ref 0.76–1.27)
CRP SERPL-MCNC: 24 MG/L (ref 0–10)
D DIMER PPP FEU-MCNC: 0.81 MG/L (FEU) (ref 0–0.59)
DEPRECATED RDW RBC AUTO: 50.3 FL (ref 37–54)
ERYTHROCYTE [DISTWIDTH] IN BLOOD BY AUTOMATED COUNT: 15.1 % (ref 12.3–15.4)
FERRITIN SERPL-MCNC: 190.8 NG/ML (ref 30–400)
FIBRINOGEN PPP-MCNC: 525 MG/DL (ref 210–450)
GFR SERPL CREATININE-BSD FRML MDRD: 78 ML/MIN/1.73
GLOBULIN UR ELPH-MCNC: 3.6 GM/DL
GLUCOSE SERPL-MCNC: 136 MG/DL (ref 65–99)
HCT VFR BLD AUTO: 45.7 % (ref 37.5–51)
HGB BLD-MCNC: 14.9 G/DL (ref 13–17.7)
LARGE PLATELETS: ABNORMAL
LDH SERPL-CCNC: 442 U/L (ref 135–225)
LYMPHOCYTES # BLD MANUAL: 1 10*3/MM3 (ref 0.7–3.1)
LYMPHOCYTES NFR BLD MANUAL: 11 % (ref 19.6–45.3)
LYMPHOCYTES NFR BLD MANUAL: 4 % (ref 5–12)
MAGNESIUM SERPL-MCNC: 2.1 MG/DL (ref 1.6–2.4)
MCH RBC QN AUTO: 30.7 PG (ref 26.6–33)
MCHC RBC AUTO-ENTMCNC: 32.6 G/DL (ref 31.5–35.7)
MCV RBC AUTO: 94.1 FL (ref 79–97)
MONOCYTES # BLD AUTO: 0.36 10*3/MM3 (ref 0.1–0.9)
NEUTROPHILS # BLD AUTO: 7.74 10*3/MM3 (ref 1.7–7)
NEUTROPHILS NFR BLD MANUAL: 83 % (ref 42.7–76)
NEUTS BAND NFR BLD MANUAL: 2 % (ref 0–5)
PLATELET # BLD AUTO: 206 10*3/MM3 (ref 140–450)
PMV BLD AUTO: 8.8 FL (ref 6–12)
POTASSIUM SERPL-SCNC: 4.9 MMOL/L (ref 3.5–5.2)
PROT SERPL-MCNC: 7 G/DL (ref 6–8.5)
RBC # BLD AUTO: 4.86 10*6/MM3 (ref 4.14–5.8)
RBC MORPH BLD: NORMAL
SCAN SLIDE: NORMAL
SMALL PLATELETS BLD QL SMEAR: ADEQUATE
SODIUM SERPL-SCNC: 135 MMOL/L (ref 136–145)
WBC # BLD AUTO: 9.1 10*3/MM3 (ref 3.4–10.8)
WBC MORPH BLD: NORMAL

## 2020-12-08 PROCEDURE — 86140 C-REACTIVE PROTEIN: CPT | Performed by: STUDENT IN AN ORGANIZED HEALTH CARE EDUCATION/TRAINING PROGRAM

## 2020-12-08 PROCEDURE — 82550 ASSAY OF CK (CPK): CPT | Performed by: STUDENT IN AN ORGANIZED HEALTH CARE EDUCATION/TRAINING PROGRAM

## 2020-12-08 PROCEDURE — 94799 UNLISTED PULMONARY SVC/PX: CPT

## 2020-12-08 PROCEDURE — 85384 FIBRINOGEN ACTIVITY: CPT | Performed by: STUDENT IN AN ORGANIZED HEALTH CARE EDUCATION/TRAINING PROGRAM

## 2020-12-08 PROCEDURE — 85007 BL SMEAR W/DIFF WBC COUNT: CPT | Performed by: STUDENT IN AN ORGANIZED HEALTH CARE EDUCATION/TRAINING PROGRAM

## 2020-12-08 PROCEDURE — 85379 FIBRIN DEGRADATION QUANT: CPT | Performed by: STUDENT IN AN ORGANIZED HEALTH CARE EDUCATION/TRAINING PROGRAM

## 2020-12-08 PROCEDURE — 82728 ASSAY OF FERRITIN: CPT | Performed by: STUDENT IN AN ORGANIZED HEALTH CARE EDUCATION/TRAINING PROGRAM

## 2020-12-08 PROCEDURE — 85025 COMPLETE CBC W/AUTO DIFF WBC: CPT | Performed by: STUDENT IN AN ORGANIZED HEALTH CARE EDUCATION/TRAINING PROGRAM

## 2020-12-08 PROCEDURE — 83615 LACTATE (LD) (LDH) ENZYME: CPT | Performed by: STUDENT IN AN ORGANIZED HEALTH CARE EDUCATION/TRAINING PROGRAM

## 2020-12-08 PROCEDURE — 25010000002 DEXAMETHASONE PER 1 MG: Performed by: INTERNAL MEDICINE

## 2020-12-08 PROCEDURE — 80053 COMPREHEN METABOLIC PANEL: CPT | Performed by: STUDENT IN AN ORGANIZED HEALTH CARE EDUCATION/TRAINING PROGRAM

## 2020-12-08 PROCEDURE — 83735 ASSAY OF MAGNESIUM: CPT | Performed by: STUDENT IN AN ORGANIZED HEALTH CARE EDUCATION/TRAINING PROGRAM

## 2020-12-08 PROCEDURE — 99232 SBSQ HOSP IP/OBS MODERATE 35: CPT | Performed by: HOSPITALIST

## 2020-12-08 RX ADMIN — Medication 10 ML: at 08:52

## 2020-12-08 RX ADMIN — ESCITALOPRAM OXALATE 5 MG: 10 TABLET ORAL at 08:51

## 2020-12-08 RX ADMIN — CYCLOBENZAPRINE HYDROCHLORIDE 5 MG: 10 TABLET, FILM COATED ORAL at 08:51

## 2020-12-08 RX ADMIN — Medication 200 MG: at 08:49

## 2020-12-08 RX ADMIN — METOCLOPRAMIDE 10 MG: 10 TABLET ORAL at 17:01

## 2020-12-08 RX ADMIN — ALBUTEROL SULFATE 2 PUFF: 108 AEROSOL, METERED RESPIRATORY (INHALATION) at 14:59

## 2020-12-08 RX ADMIN — SIMETHICONE 120 MG: 80 TABLET, CHEWABLE ORAL at 08:51

## 2020-12-08 RX ADMIN — TRAZODONE HYDROCHLORIDE 75 MG: 50 TABLET ORAL at 20:22

## 2020-12-08 RX ADMIN — BUDESONIDE AND FORMOTEROL FUMARATE DIHYDRATE 2 PUFF: 160; 4.5 AEROSOL RESPIRATORY (INHALATION) at 07:43

## 2020-12-08 RX ADMIN — METOCLOPRAMIDE 10 MG: 10 TABLET ORAL at 06:14

## 2020-12-08 RX ADMIN — CLONIDINE HYDROCHLORIDE 0.1 MG: 0.1 TABLET ORAL at 20:21

## 2020-12-08 RX ADMIN — Medication 600 MG: at 20:21

## 2020-12-08 RX ADMIN — DOCUSATE SODIUM 100 MG: 100 CAPSULE, LIQUID FILLED ORAL at 20:22

## 2020-12-08 RX ADMIN — CYCLOBENZAPRINE HYDROCHLORIDE 5 MG: 10 TABLET, FILM COATED ORAL at 17:01

## 2020-12-08 RX ADMIN — LIDOCAINE 1 PATCH: 50 PATCH TOPICAL at 08:52

## 2020-12-08 RX ADMIN — MONTELUKAST 10 MG: 10 TABLET, FILM COATED ORAL at 20:21

## 2020-12-08 RX ADMIN — OXYCODONE HYDROCHLORIDE AND ACETAMINOPHEN 1000 MG: 500 TABLET ORAL at 08:50

## 2020-12-08 RX ADMIN — BUSPIRONE HYDROCHLORIDE 15 MG: 10 TABLET ORAL at 20:21

## 2020-12-08 RX ADMIN — ALBUTEROL SULFATE 2 PUFF: 108 AEROSOL, METERED RESPIRATORY (INHALATION) at 20:11

## 2020-12-08 RX ADMIN — DOCUSATE SODIUM 100 MG: 100 CAPSULE, LIQUID FILLED ORAL at 17:01

## 2020-12-08 RX ADMIN — BUSPIRONE HYDROCHLORIDE 15 MG: 10 TABLET ORAL at 17:01

## 2020-12-08 RX ADMIN — GUAIFENESIN 600 MG: 600 TABLET, EXTENDED RELEASE ORAL at 20:21

## 2020-12-08 RX ADMIN — METOCLOPRAMIDE 10 MG: 10 TABLET ORAL at 12:13

## 2020-12-08 RX ADMIN — DEXAMETHASONE SODIUM PHOSPHATE 6 MG: 4 INJECTION, SOLUTION INTRAMUSCULAR; INTRAVENOUS at 08:52

## 2020-12-08 RX ADMIN — HYDROCODONE BITARTRATE AND ACETAMINOPHEN 1 TABLET: 5; 325 TABLET ORAL at 09:05

## 2020-12-08 RX ADMIN — BUSPIRONE HYDROCHLORIDE 15 MG: 10 TABLET ORAL at 08:51

## 2020-12-08 RX ADMIN — APIXABAN 10 MG: 5 TABLET, FILM COATED ORAL at 08:50

## 2020-12-08 RX ADMIN — Medication 5000 UNITS: at 08:50

## 2020-12-08 RX ADMIN — BUMETANIDE 1 MG: 1 TABLET ORAL at 08:50

## 2020-12-08 RX ADMIN — CYCLOBENZAPRINE HYDROCHLORIDE 5 MG: 10 TABLET, FILM COATED ORAL at 20:22

## 2020-12-08 RX ADMIN — Medication 10 MG: at 20:23

## 2020-12-08 RX ADMIN — POTASSIUM CHLORIDE 20 MEQ: 1500 TABLET, EXTENDED RELEASE ORAL at 12:13

## 2020-12-08 RX ADMIN — Medication 600 MG: at 08:49

## 2020-12-08 RX ADMIN — DILTIAZEM HYDROCHLORIDE 120 MG: 120 CAPSULE, COATED, EXTENDED RELEASE ORAL at 08:50

## 2020-12-08 RX ADMIN — APIXABAN 10 MG: 5 TABLET, FILM COATED ORAL at 20:21

## 2020-12-08 RX ADMIN — HYDROCODONE BITARTRATE AND ACETAMINOPHEN 1 TABLET: 5; 325 TABLET ORAL at 20:40

## 2020-12-08 RX ADMIN — ATORVASTATIN CALCIUM 40 MG: 40 TABLET, FILM COATED ORAL at 08:50

## 2020-12-08 RX ADMIN — CLONIDINE HYDROCHLORIDE 0.1 MG: 0.1 TABLET ORAL at 08:50

## 2020-12-08 RX ADMIN — CETIRIZINE HYDROCHLORIDE 10 MG: 10 TABLET, FILM COATED ORAL at 20:21

## 2020-12-08 RX ADMIN — Medication: at 20:23

## 2020-12-08 RX ADMIN — ALBUTEROL SULFATE 2 PUFF: 108 AEROSOL, METERED RESPIRATORY (INHALATION) at 07:44

## 2020-12-08 RX ADMIN — ZINC SULFATE 220 MG (50 MG) CAPSULE 220 MG: CAPSULE at 08:52

## 2020-12-08 RX ADMIN — ALBUTEROL SULFATE 2 PUFF: 108 AEROSOL, METERED RESPIRATORY (INHALATION) at 10:51

## 2020-12-08 RX ADMIN — GUAIFENESIN 600 MG: 600 TABLET, EXTENDED RELEASE ORAL at 08:50

## 2020-12-08 RX ADMIN — BUDESONIDE AND FORMOTEROL FUMARATE DIHYDRATE 2 PUFF: 160; 4.5 AEROSOL RESPIRATORY (INHALATION) at 20:12

## 2020-12-08 RX ADMIN — SIMETHICONE 120 MG: 80 TABLET, CHEWABLE ORAL at 20:21

## 2020-12-08 RX ADMIN — TAMSULOSIN HYDROCHLORIDE 0.4 MG: 0.4 CAPSULE ORAL at 08:50

## 2020-12-08 RX ADMIN — DOCUSATE SODIUM 100 MG: 100 CAPSULE, LIQUID FILLED ORAL at 08:50

## 2020-12-08 RX ADMIN — ZINC SULFATE 220 MG (50 MG) CAPSULE 220 MG: CAPSULE at 20:23

## 2020-12-08 RX ADMIN — Medication 10 ML: at 20:23

## 2020-12-08 RX ADMIN — SIMETHICONE 120 MG: 80 TABLET, CHEWABLE ORAL at 17:00

## 2020-12-08 RX ADMIN — Medication: at 08:52

## 2020-12-08 RX ADMIN — PANTOPRAZOLE SODIUM 40 MG: 40 TABLET, DELAYED RELEASE ORAL at 06:13

## 2020-12-08 NOTE — PLAN OF CARE
Goal Outcome Evaluation:  Plan of Care Reviewed With: patient  Progress: no change  Outcome Summary: patient has been wiened off of precison flow and currently on 15 liters high arcelia. His saturation is 94 percent currently. Goal is to continue to titrated his oxygen down and see if he tolerates.

## 2020-12-08 NOTE — PROGRESS NOTES
[facility] Medicine Services Daily Progress Note      Hospitalist Team  LOS 5 days      Patient Care Team:  Ba Loaiza MD as PCP - General (Geriatric Medicine)    Patient Location: 2125/1      Subjective   Subjective     Chief Complaint / Subjective  Chief Complaint   Patient presents with   • Shortness of Breath         Brief Synopsis of Hospital Course/HPI  HPI limited due to patient on Bipap mask, mostly taken from medical record review and ER documentation     Mr. Dowling is a 65 y.o. male with PMH of COPD on oxygen nocturnally, HTN, CAD, A.fib on Xarelto, CKD, CVA, GERD, anemia, and insomnia who presented to [facility] ER 12/1/20 [place] [facility] [place] per EMS of shortness of breath and hypoxia. He was reported to have O2 saturation 69% on room air. He was placed on 2L O2 via NC and had O2 saturation 74%. He was placed on non-rebreather by EMS. He reported some left sided chest pain, cough. Per ER he had no fever, nausea, vomiting, or diarrhea but reported constipation. The patient tested positive at his facility on 11/23/2020 per ER documentation. He normally wears O2 at night.      In the ER patient had CT PE protocol that showed likely subacute pulmonary embolus within the distal main pulmonary artery on the left extending into the interlobar artery of the left lower lobe. Findings throughout the lungs likely related to Covid pneumonia. Emphysema. ABG showed ph 7.39, CO2 43.2, O2 64, HCO3 26.5 on 100% non-rebreather. He was placed on Bipap. WBC normal, temp 99.2. Respiratory viral panel WITHOUT Covid was negative. Troponin negative x2. Blood cultures were drawn. He was given IV rocephin, zithromax, decadron, 324mg aspirin, started treatment lovenox. He was admitted to the PCU for further treatment.                Date::      12/2/2020  Patient had been high flow oxygen.  Short of breath    12/3/2020  Still on high flow nasal cannula with precision flow device  Denies any  "nausea or vomiting or abdominal pain or chest pain  Switching to Eliquis since difficult to control his PTT with heparin drip  Discussed with nursing staff    12/4  Patient has persistent cough  Medications adjusted to help her symptoms  Events overnight reviewed  Decrease steroids  We will discuss with pulmonologist    12/5/2020  Patient lying down in bed without distress but has high flow precision cannula in place with 80%FiO2  Asked him to drink water  He denies abdominal pain but has chest pain  Previous troponin were done and were negative on he came in.  Likely related to his pneumonia and cough.  CRP decreasing      12/6  Patient seenStill complaining of shortness of breath.  Had intermittent dry cough.  Oxygen saturatPrecision flow cannulaion between 92 and 96Percent on 60%    12/7  O2 being titrated down, in no acute distress, remains on HFNC     ROS  All other systems reviewed and negative    Objective   Objective      Vital Signs  Temp:  [97.6 °F (36.4 °C)-98.6 °F (37 °C)] 98.3 °F (36.8 °C)  Heart Rate:  [79-96] 93  Resp:  [20-25] 23  BP: (132-146)/(85-95) 146/91  Oxygen Therapy  SpO2: 92 %  Pulse Oximetry Type: Continuous  Device (Oxygen Therapy): high-flow nasal cannula, heated  $ High Flow Nasal Cannula Set-Up: yes  Flow (L/min): 20  Oxygen Concentration (%): 40  Flowsheet Rows      First Filed Value   Admission Height  180.3 cm (71\") Documented at 11/30/2020 2358   Admission Weight  104 kg (230 lb) Documented at 11/30/2020 2358        Intake & Output (last 3 days)       12/05 0701 - 12/06 0700 12/06 0701 - 12/07 0700 12/07 0701 - 12/08 0700    P.O. 360 840 720    Total Intake(mL/kg) 360 (4.2) 840 (10) 720 (8.5)    Urine (mL/kg/hr) 1775 (0.9) 1600 (0.8) 650 (0.6)    Stool 0      Total Output 1775 1600 650    Net -1415 -760 +70           Urine Unmeasured Occurrence  1 x     Stool Unmeasured Occurrence 3 x          Lines, Drains & Airways    Active LDAs     Name:   Placement date:   Placement time:   " Site:   Days:    Peripheral IV 12/01/20 0039 Right Antecubital   12/01/20 0039    Antecubital   1                  Physical Exam:    Physical Exam  Vitals signs and nursing note reviewed.   Constitutional:       General: He is not in acute distress.     Appearance: Normal appearance. He is well-developed. He is not ill-appearing, toxic-appearing or diaphoretic.   HENT:      Head: Normocephalic and atraumatic.      Right Ear: Ear canal and external ear normal.      Left Ear: Ear canal and external ear normal.      Nose: Nose normal. No congestion or rhinorrhea.      Mouth/Throat:      Mouth: Mucous membranes are moist.      Pharynx: No oropharyngeal exudate.   Eyes:      General: No scleral icterus.        Right eye: No discharge.         Left eye: No discharge.      Extraocular Movements: Extraocular movements intact.      Conjunctiva/sclera: Conjunctivae normal.      Pupils: Pupils are equal, round, and reactive to light.   Neck:      Musculoskeletal: Normal range of motion and neck supple. No neck rigidity or muscular tenderness.      Thyroid: No thyromegaly.      Vascular: No carotid bruit or JVD.      Trachea: No tracheal deviation.   Cardiovascular:      Rate and Rhythm: Normal rate and regular rhythm.      Pulses: Normal pulses.      Heart sounds: Normal heart sounds. No murmur. No friction rub. No gallop.    Pulmonary:      Effort: Accessory muscle usage and respiratory distress present.      Breath sounds: No stridor. Decreased breath sounds and rales present. No wheezing or rhonchi.   Chest:      Chest wall: No tenderness.   Abdominal:      General: Bowel sounds are normal. There is no distension.      Palpations: Abdomen is soft. There is no mass.      Tenderness: There is no abdominal tenderness. There is no guarding or rebound.      Hernia: No hernia is present.   Musculoskeletal: Normal range of motion.         General: No swelling, tenderness, deformity or signs of injury.      Right lower leg: No  edema.      Left lower leg: No edema.   Lymphadenopathy:      Cervical: No cervical adenopathy.   Skin:     General: Skin is warm and dry.      Coloration: Skin is not jaundiced or pale.      Findings: No bruising, erythema or rash.   Neurological:      General: No focal deficit present.      Mental Status: He is alert and oriented to person, place, and time. Mental status is at baseline.      Cranial Nerves: No cranial nerve deficit.      Sensory: No sensory deficit.      Motor: No weakness or abnormal muscle tone.      Coordination: Coordination normal.   Psychiatric:         Mood and Affect: Mood normal.         Behavior: Behavior normal.         Thought Content: Thought content normal.         Judgment: Judgment normal.               Procedures:              Results Review:     I reviewed the patient's new clinical results.      Lab Results (last 24 hours)     Procedure Component Value Units Date/Time    Extra Tubes [[id number]] Collected: 12/07/20 0300    Specimen: Blood, Venous Line Updated: 12/07/20 0400    Narrative:      The following orders were created for panel order Extra Tubes.  Procedure                               Abnormality         Status                     ---------                               -----------         ------                     Gold Top - SST[[id number]]                                   Final result                 Please view results for these tests on the individual orders.    Gold Top - SST [[id number]] Collected: 12/07/20 0300    Specimen: Blood Updated: 12/07/20 0400     Extra Tube Hold for add-ons.     Comment: Auto resulted.       Ferritin [[id number]]  (Normal) Collected: 12/07/20 0300    Specimen: Blood Updated: 12/07/20 0359     Ferritin 246.50 ng/mL     Narrative:      Results may be falsely decreased if patient taking Biotin.      CBC & Differential [[id number]] Collected: 12/07/20 0300    Specimen: Blood Updated: 12/07/20 0355    Narrative:      The following orders were  created for panel order CBC & Differential.  Procedure                               Abnormality         Status                     ---------                               -----------         ------                     Scan Slide[[id number]]                                       Final result               CBC Auto Differential[[id number]]        Normal              Final result                 Please view results for these tests on the individual orders.    CBC Auto Differential [[id number]]  (Normal) Collected: 12/07/20 0300    Specimen: Blood Updated: 12/07/20 0355     WBC 8.50 10*3/mm3      RBC 4.74 10*6/mm3      Hemoglobin 14.6 g/dL      Hematocrit 44.6 %      MCV 93.9 fL      MCH 30.8 pg      MCHC 32.8 g/dL      RDW 15.2 %      RDW-SD 50.8 fl      MPV 8.6 fL      Platelets 272 10*3/mm3     Scan Slide [[id number]] Collected: 12/07/20 0300    Specimen: Blood Updated: 12/07/20 0355     Scan Slide --     Comment: See Manual Differential Results       Manual Differential [[id number]]  (Abnormal) Collected: 12/07/20 0300    Specimen: Blood Updated: 12/07/20 0355     Neutrophil % 87.0 %      Lymphocyte % 10.0 %      Monocyte % 1.0 %      Bands %  2.0 %      Neutrophils Absolute 7.57 10*3/mm3      Lymphocytes Absolute 0.85 10*3/mm3      Monocytes Absolute 0.09 10*3/mm3      Dacrocytes Slight/1+     Poikilocytes Slight/1+     WBC Morphology Normal     Platelet Estimate Adequate     Large Platelets Slight/1+     Giant Platelets Slight/1+    Comprehensive Metabolic Panel [[id number]]  (Abnormal) Collected: 12/07/20 0300    Specimen: Blood Updated: 12/07/20 0348     Glucose 134 mg/dL      BUN 44 mg/dL      Creatinine 1.03 mg/dL      Sodium 138 mmol/L      Potassium 4.8 mmol/L      Chloride 103 mmol/L      CO2 24.0 mmol/L      Calcium 9.0 mg/dL      Total Protein 6.8 g/dL      Albumin 3.50 g/dL      ALT (SGPT) 60 U/L      AST (SGOT) 35 U/L      Alkaline Phosphatase 120 U/L      Total Bilirubin 0.6 mg/dL      eGFR Non  Amer  72 mL/min/1.73      Globulin 3.3 gm/dL      A/G Ratio 1.1 g/dL      BUN/Creatinine Ratio 42.7     Anion Gap 11.0 mmol/L     Narrative:      GFR Normal >60  Chronic Kidney Disease <60  Kidney Failure <15      CK [[id number]]  (Normal) Collected: 12/07/20 0300    Specimen: Blood Updated: 12/07/20 0348     Creatine Kinase 57 U/L     Magnesium [[id number]]  (Normal) Collected: 12/07/20 0300    Specimen: Blood Updated: 12/07/20 0348     Magnesium 2.1 mg/dL     C-reactive Protein [[id number]] Collected: 12/07/20 0300    Specimen: Blood Updated: 12/07/20 0318        No results found for: HGBA1C  Results from last 7 days   Lab Units 12/01/20  1102 12/01/20  0020   INR  1.05 1.17*       Results from last 7 days   Lab Units 12/01/20  0027   PH, ARTERIAL pH units 7.396   PO2 ART mm Hg 64.2*   PCO2, ARTERIAL mm Hg 43.2   HCO3 ART mmol/L 26.5     No results found for: LIPASE  No results found for: CHOL, CHLPL, TRIG, HDL, LDL, LDLDIRECT    No results found for: INTRAOP, PREDX, FINALDX, COMDX    Microbiology Results (last 10 days)     Procedure Component Value - Date/Time    Respiratory Panel PCR w/COVID-19(SARS-CoV-2) ARNAV/PURNIMA/HOLLY/PAD/COR/MAD/GLADYS In-House, NP Swab in UTM/VTM, 3-4 HR TAT - Swab, Nasopharynx [[id number]]  (Abnormal) Collected: 12/02/20 0548    Lab Status: Final result Specimen: Swab from Nasopharynx Updated: 12/02/20 0659     ADENOVIRUS, PCR Not Detected     Coronavirus 229E Not Detected     Coronavirus HKU1 Not Detected     Coronavirus NL63 Not Detected     Coronavirus OC43 Not Detected     COVID19 Detected     Human Metapneumovirus Not Detected     Human Rhinovirus/Enterovirus Not Detected     Influenza A PCR Not Detected     Influenza B PCR Not Detected     Parainfluenza Virus 1 Not Detected     Parainfluenza Virus 2 Not Detected     Parainfluenza Virus 3 Not Detected     Parainfluenza Virus 4 Not Detected     RSV, PCR Not Detected     Bordetella pertussis pcr Not Detected     Bordetella parapertussis PCR Not  Detected     Chlamydophila pneumoniae PCR Not Detected     Mycoplasma pneumo by PCR Not Detected    Narrative:      Fact sheet for providers: https://docs.[employer]/wp-content/uploads/BBV7635-7508-NR7.1-EUA-Provider-Fact-Sheet-3.pdf    Fact sheet for patients: https://docs.[employer]/wp-content/uploads/DNK6830-1751-XH4.1-EUA-Patient-Fact-Sheet-1.pdf    Respiratory Panel, PCR (WITHOUT COVID) - Swab, Nasopharynx [[id number]]  (Normal) Collected: 12/01/20 0507    Lab Status: Final result Specimen: Swab from Nasopharynx Updated: 12/01/20 0704     ADENOVIRUS, PCR Not Detected     Coronavirus 229E Not Detected     Coronavirus HKU1 Not Detected     Coronavirus NL63 Not Detected     Coronavirus OC43 Not Detected     Human Metapneumovirus Not Detected     Human Rhinovirus/Enterovirus Not Detected     Influenza B PCR Not Detected     Parainfluenza Virus 1 Not Detected     Parainfluenza Virus 2 Not Detected     Parainfluenza Virus 3 Not Detected     Parainfluenza Virus 4 Not Detected     Bordetella pertussis pcr Not Detected     Influenza A H1 2009 PCR Not Detected     Chlamydophila pneumoniae PCR Not Detected     Mycoplasma pneumo by PCR Not Detected     Influenza A PCR Not Detected     Influenza A H3 Not Detected     Influenza A H1 Not Detected     RSV, PCR Not Detected     Bordetella parapertussis PCR Not Detected    Narrative:      The coronavirus on the RVP is NOT COVID-19 and is NOT indicative of infection with COVID-19.     Blood Culture - Blood, Arm, Right [[id number]] Collected: 12/01/20 0020    Lab Status: Final result Specimen: Blood from Arm, Right Updated: 12/06/20 0030     Blood Culture No growth at 5 days    Blood Culture - Blood, Arm, Left [[id number]]  (Abnormal) Collected: 12/01/20 0020    Lab Status: Final result Specimen: Blood from Arm, Left Updated: 12/02/20 1309     Blood Culture Staphylococcus, coagulase negative     Comment: Probable contaminant requires clinical correlation, susceptibility not  performed unless requested by physician.          Isolated from Aerobic Bottle     Gram Stain Aerobic Bottle Gram positive cocci in clusters    Blood Culture ID, PCR - Blood, Arm, Left [[id number]]  (Abnormal) Collected: 12/01/20 0020    Lab Status: Final result Specimen: Blood from Arm, Left Updated: 12/01/20 2241     BCID, PCR Staphylococcus spp, not aureus. Identification by BCID PCR.          ECG/EMG Results (most recent)     Procedure Component Value Units Date/Time    ECG 12 Lead [[id number]]  (Abnormal) Resulted: 12/01/20 0256     Updated: 12/01/20 0256    ECG 12 Lead [[id number]] Collected: 12/01/20 0012     Updated: 12/01/20 1524     QT Interval 447 ms     Narrative:      HEART RATE= 91  bpm  RR Interval= 659  ms  MD Interval=   ms  P Horizontal Axis=   deg  P Front Axis=   deg  QRSD Interval= 93  ms  QT Interval= 447  ms  QRS Axis= -36  deg  T Wave Axis= -23  deg  - ABNORMAL ECG -  Atrial fibrillation  Inferior infarct, age indeterminate  Anterior infarct, old  Prolonged QT interval  No previous ECG available for comparison  Electronically Signed By: Ryne Portillo ([facility]) 01-Dec-2020 15:22:22  Date and Time of Study: 2020-12-01 00:12:20    ECG 12 Lead [[id number]] Collected: 12/04/20 0525     Updated: 12/04/20 0527     QT Interval 391 ms     Narrative:      HEART RATE= 75  bpm  RR Interval= 801  ms  MD Interval=   ms  P Horizontal Axis=   deg  P Front Axis=   deg  QRSD Interval= 97  ms  QT Interval= 391  ms  QRS Axis= -37  deg  T Wave Axis= -5  deg  - ABNORMAL ECG -  Atrial fibrillation  Inferior infarct, old  Anterior infarct, old  Electronically Signed By:   Date and Time of Study: 2020-12-04 05:25:33                    Xr Chest 1 View    Result Date: 12/1/2020  Diffuse bilateral infiltrates consistent with multifocal pneumonia. Electronically signed by:  Naveed Welch M.D.  12/1/2020 12:26 AM    Ct Chest Pulmonary Embolism    Result Date: 12/1/2020  1. Likely subacute pulmonary embolus within the distal main  pulmonary artery on the left extending into the interlobar artery of the left lower lobe. 2. No evidence of thoracic aortic aneurysm or dissection. 3. Findings throughout the lungs likely related to a Covid pneumonia given the patient's clinical history. Follow-up to clearing recommended. Likely area of rounded atelectasis, left lower lobe. 4. Emphysema. These critical findings were discussed with JT Kimball Electronically signed by:  Costa Lou D.O.  12/1/2020 12:34 AM          Xrays, labs reviewed personally by physician.    Medication Review:   I have reviewed the patient's current medication list      Scheduled Meds  Acetylcysteine, 600 mg, Oral, BID  albuterol sulfate HFA, 2 puff, Inhalation, 4x Daily - RT  ammonium lactate, , Topical, Q12H  apixaban, 10 mg, Oral, BID    Followed by  [START ON 12/10/2020] apixaban, 5 mg, Oral, BID  atorvastatin, 40 mg, Oral, Daily  budesonide-formoterol, 2 puff, Inhalation, BID - RT  bumetanide, 1 mg, Oral, Daily  busPIRone, 15 mg, Oral, TID  cetirizine, 10 mg, Oral, Nightly  cloNIDine, 0.1 mg, Oral, Q12H  cyclobenzaprine, 5 mg, Oral, TID  dexamethasone, 6 mg, Intravenous, Daily  dilTIAZem CD, 120 mg, Oral, Daily  docusate sodium, 100 mg, Oral, TID  doxycycline, 100 mg, Oral, Q12H  escitalopram, 5 mg, Oral, Daily  guaiFENesin, 600 mg, Oral, Q12H  lidocaine, 1 patch, Transdermal, Q24H  melatonin, 10 mg, Oral, Nightly  metoclopramide, 10 mg, Oral, TID AC  montelukast, 10 mg, Oral, Nightly  pantoprazole, 40 mg, Oral, Q AM  potassium chloride, 20 mEq, Oral, Daily With Lunch  simethicone, 120 mg, Oral, TID  sodium chloride, 10 mL, Intravenous, Q12H  tamsulosin, 0.4 mg, Oral, Daily  thiamine, 200 mg, Oral, Daily  traZODone, 75 mg, Oral, Nightly  vitamin C, 1,000 mg, Oral, Daily  vitamin D3, 5,000 Units, Oral, Daily  zinc sulfate, 220 mg, Oral, BID        Meds Infusions  Pharmacy Consult - Pharmacy to dose,         Meds PRN  •  acetaminophen **OR** acetaminophen **OR**  acetaminophen  •  acetaminophen  •  benzonatate  •  bismuth subsalicylate  •  fleet enema  •  HYDROcodone-acetaminophen  •  lactulose  •  magnesium sulfate **OR** magnesium sulfate in D5W 1g/100mL (PREMIX)  •  melatonin  •  nitroglycerin  •  ondansetron **OR** ondansetron  •  Pharmacy Consult - Pharmacy to dose  •  polyethylene glycol  •  potassium chloride **OR** potassium chloride **OR** potassium chloride  •  sodium chloride  •  sodium chloride  •  sodium chloride  •  witch hazel-glycerin        Assessment/Plan   Assessment/Plan     Active Hospital Problems    Diagnosis  POA   • **Acute respiratory failure due to COVID-19 (CMS/[facility]) [U07.1, J96.00]  Yes   • Pneumonia of both lungs due to infectious organism [J18.9]  Unknown   • Subacute pulmonary embolism (CMS/[facility]) [I26.99]  Unknown   • Renal disease [N28.9]  Yes   • CAD (coronary artery disease) [I25.10]  Yes   • Hypertension [I10]  Yes   • Stroke (CMS/[facility]) [I63.9]  Yes   • Mood disorder (CMS/[facility]) [F39]  Yes   • GERD (gastroesophageal reflux disease) [K21.9]  Yes   • Afib (CMS/[facility]) [I48.91]  Yes   • Insomnia [G47.00]  Yes   • BPH (benign prostatic hyperplasia) [N40.0]  Yes      Resolved Hospital Problems   No resolved problems to display.       MEDICAL DECISION MAKING COMPLEXITY BY PROBLEM:        Acute respiratory failure due to Covid-19, Pneumonia due to Covid-19  -ABG: showed ph 7.39, CO2 43.2, O2 64, HCO3 26.5 on 100% non-rebreather  -currently on Bipap at 100%  -CT PE protocol: likely subacute pulmonary embolus within the distal main pulmonary artery on the left extending into the interlobar artery of the left lower lobe. Findings throughout the lungs likely related to Covid pneumonia. Emphysema  -, ferritin normal, procalcitonin 0.39, d-dimer 0.62, WBC normal, temp 99.2  -given IV rocephin, zithromax, and decadron in the ER  On Decadron and Remdesivir  De-escalate antibiotics.  Now on doxycycline  On Bumex  Appreciate pulmonary input    Acute  pulmonary embolus  -CT PE protocol as above  -Initially received heparin drip  Switch to Eliquis.      Positive blood culture likely contamination with coagulase-negative staph x1    CKD  -creatinine 1.31  -monitor BMP     COPD with acute exacerbation  -on oxygen at night at baseline  -tx as above     Chronic atrial fibrillation  -cont home cardizem  Now on Eliquis.  Discontinue Xarelto on discharge     Hypertension  -cont home cardizem,   Decrease dose of clonidine  Holding losartan     Previous CVA  -on chronic anticoagulation,   On statin       Depression  -cont home buspar, lexapro     Chronic pain  -on norco (Verified by Rx by [facility])     Chronic constipation  -prn laxatives      BPH  -cont home flomax        VTE Prophylaxis - switch to Eliquis       VTE Prophylaxis -   Mechanical Order History:     None      Pharmalogical Order History:      Ordered     Dose Route Frequency Stop    12/01/20 0309  enoxaparin (LOVENOX) syringe 40 mg  Status:  Discontinued      40 mg SC Every 24 Hours 12/01/20 1041    12/01/20 1042  heparin 80244 units/250 mL (100 units/mL) in 0.45 % NaCl infusion  14.97 mL/hr      14.4 Units/kg/hr IV Titrated --    12/01/20 1042  heparin bolus from bag 4,200 Units      40 Units/kg IV Every 6 Hours PRN --    12/01/20 1042  heparin bolus from bag 8,300 Units      80 Units/kg IV Every 6 Hours PRN --    12/01/20 0238  enoxaparin (LOVENOX) syringe 100 mg  Status:  Discontinued      1 mg/kg SC Once 12/01/20 0239                  Code Status -   Code Status and Medical Interventions:   Ordered at: 12/01/20 0308     Code Status:    CPR     Medical Interventions (Level of Support Prior to Arrest):    Full       This patient has been examined wearing appropriate Personal Protective Equipment and discussed with hospital infection control department. 12/07/20        Discharge Planning  Pending clinical improvement, O2 needs being titrated down      Electronically signed by Svitlana Collins MD, 12/07/20,  19:21 EST.  [facility] Hospitalist Team

## 2020-12-08 NOTE — PROGRESS NOTES
"PULMONARY CRITICAL CARE Progress  NOTE      PATIENT IDENTIFICATION:  Name: Gordon Dowling  MRN: [id number]  :  1955     Age: 65 y.o.  Sex: male    DATE OF Note:  2020   Referring Physician: Svitlana Collins MD                  Subjective:   Less shortness of breath SOB no chest pain, no nausea or vomiting, no change in bowel habit, no dysuria,  no new  skin rash or itching.      Objective:  tMax 24 hrs: Temp (24hrs), Av.8 °F (36.6 °C), Min:97.3 °F (36.3 °C), Max:98.3 °F (36.8 °C)      Vitals Ranges:   Temp:  [97.3 °F (36.3 °C)-98.3 °F (36.8 °C)] 97.3 °F (36.3 °C)  Heart Rate:  [79-96] 86  Resp:  [20-24] 20  BP: (132-146)/(85-95) 144/85    Intake and Output Last 3 Shifts:   I/O last 3 completed shifts:  In: 1560 [P.O.:1560]  Out: 2250 [Urine:2250]    Exam:  /85 (BP Location: Right arm)   Pulse 86   Temp 97.3 °F (36.3 °C) (Oral)   Resp 20   Ht 180.3 cm (71\")   Wt 84.3 kg (185 lb 13.6 oz)   SpO2 93%   BMI 25.92 kg/m²     General Appearance:     HEENT:  Normocephalic, without obvious abnormality, Conjunctiva/corneas clear,.  Normal external ear canals, Nares normal, no drainage     Neck:  Supple, symmetrical, trachea midline. No JVD.  Lungs /Chest wall:   Bilateral basal rhonchi, respirations unlabored symmetrical wall movement.     Heart:  Regular rate and rhythm, systolic murmur PMI left sternal border  Abdomen: Soft, non-tender, no masses, no organomegaly.    Extremities: Trace edema no clubbing or Cyanosis        Medications:    Current Facility-Administered Medications:   •  acetaminophen (TYLENOL) tablet 650 mg, 650 mg, Oral, Q4H PRN **OR** acetaminophen (TYLENOL) 160 MG/5ML solution 650 mg, 650 mg, Oral, Q4H PRN **OR** acetaminophen (TYLENOL) suppository 650 mg, 650 mg, Rectal, Q4H PRN, Mary Russo APRN  •  acetaminophen (TYLENOL) tablet 650 mg, 650 mg, Oral, Q6H PRN, Ondina Lawson APRN  •  Acetylcysteine capsule 600 mg, 600 mg, Oral, BID, DrawKae MD, 600 mg at " 12/07/20 2035  •  albuterol sulfate HFA (PROVENTIL HFA;VENTOLIN HFA;PROAIR HFA) inhaler 2 puff, 2 puff, Inhalation, 4x Daily - RT, Ondina Lawson APRN, 2 puff at 12/07/20 2011  •  ammonium lactate (LAC-HYDRIN) 12 % lotion, , Topical, Q12H, Ondina Lawson APRN, 1 application at 12/07/20 2040  •  apixaban (ELIQUIS) tablet 10 mg, 10 mg, Oral, BID, 10 mg at 12/07/20 2036 **FOLLOWED BY** [START ON 12/10/2020] apixaban (ELIQUIS) tablet 5 mg, 5 mg, Oral, BID, FranklynMono MD  •  atorvastatin (LIPITOR) tablet 40 mg, 40 mg, Oral, Daily, Mono Estes MD, 40 mg at 12/07/20 0910  •  benzonatate (TESSALON) capsule 100 mg, 100 mg, Oral, Q8H PRN, Ondina Lawson APRN, 100 mg at 12/04/20 0504  •  bismuth subsalicylate (PEPTO BISMOL) chewable tablet 524 mg, 524 mg, Oral, Q4H PRN, Ondina Lawson, APRN  •  budesonide-formoterol (SYMBICORT) 160-4.5 MCG/ACT inhaler 2 puff, 2 puff, Inhalation, BID - RT, Ondina Lawson APRN, 2 puff at 12/07/20 2011  •  bumetanide (BUMEX) tablet 1 mg, 1 mg, Oral, Daily, Ondina Lawson, APRN, 1 mg at 12/07/20 0909  •  busPIRone (BUSPAR) tablet 15 mg, 15 mg, Oral, TID, Ondina Lawson, APRN, 15 mg at 12/07/20 2038  •  cetirizine (zyrTEC) tablet 10 mg, 10 mg, Oral, Nightly, Ondina Lawson, APRN, 10 mg at 12/07/20 2037  •  cloNIDine (CATAPRES) tablet 0.1 mg, 0.1 mg, Oral, Q12H, Mono Estes MD, 0.1 mg at 12/07/20 2035  •  cyclobenzaprine (FLEXERIL) tablet 5 mg, 5 mg, Oral, TID, Ondina Lawson APRN, 5 mg at 12/07/20 2032  •  dexamethasone (DECADRON) injection 6 mg, 6 mg, Intravenous, Daily, Mono Estes MD, 6 mg at 12/07/20 1052  •  dilTIAZem CD (CARDIZEM CD) 24 hr capsule 120 mg, 120 mg, Oral, Daily, Ondina Lawson APRN, 120 mg at 12/07/20 0909  •  docusate sodium (COLACE) capsule 100 mg, 100 mg, Oral, TID, Ondina Lawson APRN, 100 mg at 12/07/20 2033  •   escitalopram (LEXAPRO) tablet 5 mg, 5 mg, Oral, Daily, Ondina Lawson, APRN, 5 mg at 12/07/20 0911  •  fleet enema 1 enema, 1 enema, Rectal, Daily PRN, Ondina Lawson APRN  •  guaiFENesin (MUCINEX) 12 hr tablet 600 mg, 600 mg, Oral, Q12H, Ondina Lawson, APRN, 600 mg at 12/07/20 2035  •  HYDROcodone-acetaminophen (NORCO) 5-325 MG per tablet 1 tablet, 1 tablet, Oral, Q6H PRN, Ondina Lawson, APRN, 1 tablet at 12/07/20 1621  •  lactulose (CHRONULAC) 10 GM/15ML solution 20 g, 20 g, Oral, Daily PRN, Ondina Lawson, APRN  •  lidocaine (LIDODERM) 5 % 1 patch, 1 patch, Transdermal, Q24H, Ondina Lawson APRN, 1 patch at 12/07/20 0912  •  Magnesium Sulfate 2 gram infusion - Mg less than or equal to 1.5 mg/dL, 2 g, Intravenous, PRN **OR** Magnesium Sulfate 1 gram infusion - Mg 1.6-1.9 mg/dL, 1 g, Intravenous, PRN, Mary Russo APRN  •  melatonin tablet 10 mg, 10 mg, Oral, Nightly, FranklynMono MD, 10 mg at 12/07/20 2034  •  melatonin tablet 5 mg, 5 mg, Oral, Nightly PRN, Mary Russo APRN  •  metoclopramide (REGLAN) tablet 10 mg, 10 mg, Oral, TID AC, Ondina Lawson, APRN, 10 mg at 12/07/20 1620  •  montelukast (SINGULAIR) tablet 10 mg, 10 mg, Oral, Nightly, Ondina Lawson APRN, 10 mg at 12/07/20 2038  •  nitroglycerin (NITROSTAT) SL tablet 0.4 mg, 0.4 mg, Sublingual, Q5 Min PRN, Mary Russo APRN  •  ondansetron (ZOFRAN) tablet 4 mg, 4 mg, Oral, Q6H PRN **OR** ondansetron (ZOFRAN) injection 4 mg, 4 mg, Intravenous, Q6H PRN, Mary Russo APRN  •  pantoprazole (PROTONIX) EC tablet 40 mg, 40 mg, Oral, Q AM, Ondina Lawson APRN, 40 mg at 12/07/20 0529  •  Pharmacy Consult - Pharmacy to dose, , Does not apply, Continuous PRN, Ondina Lawson APRN  •  polyethylene glycol (MIRALAX) packet 17 g, 17 g, Oral, Daily PRN, Ondina Lawson APRN, 17 g at 12/04/20 0504  •  potassium chloride (K-DUR,KLOR-CON) CR tablet 20 mEq, 20  mEq, Oral, Daily With Lunch, Narciso Lawsonle, APRN, 20 mEq at 12/07/20 1145  •  potassium chloride (K-DUR,KLOR-CON) CR tablet 40 mEq, 40 mEq, Oral, PRN **OR** potassium chloride (KLOR-CON) packet 40 mEq, 40 mEq, Oral, PRN **OR** potassium chloride 10 mEq in 100 mL IVPB, 10 mEq, Intravenous, Q1H PRN, Russo Mary, APRN  •  simethicone (MYLICON) chewable tablet 120 mg, 120 mg, Oral, TID, Achterannabella, Ondina, APRN, 120 mg at 12/07/20 2033  •  sodium chloride 0.9 % flush 10 mL, 10 mL, Intravenous, PRN, Mariana Rodriguez MD  •  sodium chloride 0.9 % flush 10 mL, 10 mL, Intravenous, PRN, Jackelyn Collado PA  •  sodium chloride 0.9 % flush 10 mL, 10 mL, Intravenous, Q12H, Karan Mary, APRN, 10 mL at 12/07/20 2040  •  sodium chloride 0.9 % flush 10 mL, 10 mL, Intravenous, PRN, Russo, Mary, APRN  •  tamsulosin (FLOMAX) 24 hr capsule 0.4 mg, 0.4 mg, Oral, Daily, JinterNarciso winchesterle, APRN, 0.4 mg at 12/07/20 0911  •  thiamine (VITAMIN B-1) tablet 200 mg, 200 mg, Oral, Daily, Mono Estes MD, 200 mg at 12/07/20 0909  •  traZODone (DESYREL) tablet 75 mg, 75 mg, Oral, Nightly, Jinterannabella Ondina, APRN, 75 mg at 12/07/20 2037  •  vitamin C (ASCORBIC ACID) tablet 1,000 mg, 1,000 mg, Oral, Daily, Kae Mishra MD, 1,000 mg at 12/07/20 0909  •  vitamin D3 capsule 5,000 Units, 5,000 Units, Oral, Daily, AchteraNrciso winchesterle, APRN, 5,000 Units at 12/07/20 0909  •  witch hazel-glycerin (TUCKS) pad, , Topical, PRN, Lulu, Ondina, APRN  •  zinc sulfate (ZINCATE) capsule 220 mg, 220 mg, Oral, BID, Mono Estes MD, 220 mg at 12/07/20 2036    Data Review:  All labs (24hrs):   Recent Results (from the past 24 hour(s))   Comprehensive Metabolic Panel    Collection Time: 12/07/20  3:00 AM    Specimen: Blood   Result Value Ref Range    Glucose 134 (H) 65 - 99 mg/dL    BUN 44 (H) 8 - 23 mg/dL    Creatinine 1.03 0.76 - 1.27 mg/dL    Sodium 138 136 - 145 mmol/L    Potassium 4.8 3.5  - 5.2 mmol/L    Chloride 103 98 - 107 mmol/L    CO2 24.0 22.0 - 29.0 mmol/L    Calcium 9.0 8.6 - 10.5 mg/dL    Total Protein 6.8 6.0 - 8.5 g/dL    Albumin 3.50 3.50 - 5.20 g/dL    ALT (SGPT) 60 (H) 1 - 41 U/L    AST (SGOT) 35 1 - 40 U/L    Alkaline Phosphatase 120 (H) 39 - 117 U/L    Total Bilirubin 0.6 0.0 - 1.2 mg/dL    eGFR Non African Amer 72 >60 mL/min/1.73    Globulin 3.3 gm/dL    A/G Ratio 1.1 g/dL    BUN/Creatinine Ratio 42.7 (H) 7.0 - 25.0    Anion Gap 11.0 5.0 - 15.0 mmol/L   CK    Collection Time: 12/07/20  3:00 AM    Specimen: Blood   Result Value Ref Range    Creatine Kinase 57 20 - 200 U/L   Ferritin    Collection Time: 12/07/20  3:00 AM    Specimen: Blood   Result Value Ref Range    Ferritin 246.50 30.00 - 400.00 ng/mL   Magnesium    Collection Time: 12/07/20  3:00 AM    Specimen: Blood   Result Value Ref Range    Magnesium 2.1 1.6 - 2.4 mg/dL   CBC Auto Differential    Collection Time: 12/07/20  3:00 AM    Specimen: Blood   Result Value Ref Range    WBC 8.50 3.40 - 10.80 10*3/mm3    RBC 4.74 4.14 - 5.80 10*6/mm3    Hemoglobin 14.6 13.0 - 17.7 g/dL    Hematocrit 44.6 37.5 - 51.0 %    MCV 93.9 79.0 - 97.0 fL    MCH 30.8 26.6 - 33.0 pg    MCHC 32.8 31.5 - 35.7 g/dL    RDW 15.2 12.3 - 15.4 %    RDW-SD 50.8 37.0 - 54.0 fl    MPV 8.6 6.0 - 12.0 fL    Platelets 272 140 - 450 10*3/mm3   Gold Top - SST    Collection Time: 12/07/20  3:00 AM   Result Value Ref Range    Extra Tube Hold for add-ons.    Scan Slide    Collection Time: 12/07/20  3:00 AM    Specimen: Blood   Result Value Ref Range    Scan Slide     Manual Differential    Collection Time: 12/07/20  3:00 AM    Specimen: Blood   Result Value Ref Range    Neutrophil % 87.0 (H) 42.7 - 76.0 %    Lymphocyte % 10.0 (L) 19.6 - 45.3 %    Monocyte % 1.0 (L) 5.0 - 12.0 %    Bands %  2.0 0.0 - 5.0 %    Neutrophils Absolute 7.57 (H) 1.70 - 7.00 10*3/mm3    Lymphocytes Absolute 0.85 0.70 - 3.10 10*3/mm3    Monocytes Absolute 0.09 (L) 0.10 - 0.90 10*3/mm3     Dacrocytes Slight/1+ None Seen    Poikilocytes Slight/1+ None Seen    WBC Morphology Normal Normal    Platelet Estimate Adequate Normal    Large Platelets Slight/1+ None Seen    Giant Platelets Slight/1+ None Seen        Imaging:  CT Chest Pulmonary Embolism  Narrative: Exam: CT Angiography of the Chest.    Date: 12/1/2020.     Comparison: None    History: Shortness of breath with Covid positive test. Elevated d-dimer.    Technique: CT examination of the chest was performed following the intravenous administration of 100 mm of Isovue-370. CT dose lowering techniques were used, to include: automated exposure control, adjustment for patient size, and/or use of iterative   reconstruction.    FINDINGS:    Mediastinum and Nata: There are several slightly enlarged right hilar lymph nodes which could be reactive.    Pleural and Pericardial spaces: There are no pleural or pericardial effusions.    Upper Abdomen: There is a 4 cm cyst is partially visualized in the upper pole the right kidney. The visualized upper abdomen otherwise appears unremarkable.    Cardiovascular: There are midline sternotomy wires. There are severe coronary artery calcifications.    Pulmonary Artery: There is peripheral thrombus seen within the distal main pulmonary artery extending into the interlobar artery of the right lower lobe which is likely subacute.    Lung Parenchyma and Airways: Scattered groundglass areas of opacity are seen throughout the lungs bilaterally which are likely related to a Covid pneumonia given the patient's clinical history. A more focal area of opacity seen in the left lower lobe.   Follow-up to clearing is recommended. There is severe upper lobe predominant centrilobular and paraseptal emphysema.    Bones: No fracture or aggressive osseous lesion.  Impression: 1. Likely subacute pulmonary embolus within the distal main pulmonary artery on the left extending into the interlobar artery of the left lower lobe.  2. No  evidence of thoracic aortic aneurysm or dissection.  3. Findings throughout the lungs likely related to a Covid pneumonia given the patient's clinical history. Follow-up to clearing recommended. Likely area of rounded atelectasis, left lower lobe.  4. Emphysema.    These critical findings were discussed with JT Kimball    Electronically signed by:  Costa Lou D.O.    12/1/2020 12:34 AM  XR Chest 1 View  Narrative: Exam: Frontal chest    INDICATION: Shortness of breath    COMPARISON: None    FINDINGS:  There are diffuse bilateral interstitial infiltrates.  There are no effusions.    Heart size is normal.  No mediastinal widening.    Sternal wires are intact.  Impression: Diffuse bilateral infiltrates consistent with multifocal pneumonia.    Electronically signed by:  Naveed Welch M.D.    12/1/2020 12:26 AM       ASSESSMENT:    Acute respiratory failure due to COVID-19 (CMS/HCC)    Pneumonia of both lungs due to infectious organism    Subacute pulmonary embolism (CMS/HCC)    Renal disease    CAD (coronary artery disease)    Hypertension    Stroke (CMS/HCC)    Mood disorder (CMS/HCC)    GERD (gastroesophageal reflux disease)    Afib (CMS/HCC)    Insomnia    BPH (benign prostatic hyperplasia)       Recommendations:   oxygen titration precision flow   Continue  Decadron  Empiric antibiotics doxycycline   Diuretics as needed  Anti-inflammatory agents will include vitamin C vitamin D zinc and Mucomyst p.o.  Continue anticoagulation on Eliquis   Aspirin    Total Critical care time in direct medical management (   ) minutes  Mary Estrada MD. D, ABSM.  Mary Estrada MD   12/7/2020  22:18 EST

## 2020-12-09 LAB
ALBUMIN SERPL-MCNC: 3.2 G/DL (ref 3.5–5.2)
ALBUMIN/GLOB SERPL: 1 G/DL
ALP SERPL-CCNC: 116 U/L (ref 39–117)
ALT SERPL W P-5'-P-CCNC: 48 U/L (ref 1–41)
ANION GAP SERPL CALCULATED.3IONS-SCNC: 8 MMOL/L (ref 5–15)
AST SERPL-CCNC: 24 U/L (ref 1–40)
BILIRUB SERPL-MCNC: 0.5 MG/DL (ref 0–1.2)
BUN SERPL-MCNC: 51 MG/DL (ref 8–23)
BUN/CREAT SERPL: 38.3 (ref 7–25)
CALCIUM SPEC-SCNC: 8.8 MG/DL (ref 8.6–10.5)
CHLORIDE SERPL-SCNC: 102 MMOL/L (ref 98–107)
CK SERPL-CCNC: 64 U/L (ref 20–200)
CO2 SERPL-SCNC: 25 MMOL/L (ref 22–29)
CREAT SERPL-MCNC: 1.33 MG/DL (ref 0.76–1.27)
CRP SERPL-MCNC: 14 MG/L (ref 0–10)
DEPRECATED RDW RBC AUTO: 50.8 FL (ref 37–54)
ERYTHROCYTE [DISTWIDTH] IN BLOOD BY AUTOMATED COUNT: 15.2 % (ref 12.3–15.4)
FERRITIN SERPL-MCNC: 189.7 NG/ML (ref 30–400)
GFR SERPL CREATININE-BSD FRML MDRD: 54 ML/MIN/1.73
GLOBULIN UR ELPH-MCNC: 3.1 GM/DL
GLUCOSE SERPL-MCNC: 130 MG/DL (ref 65–99)
HCT VFR BLD AUTO: 43.7 % (ref 37.5–51)
HGB BLD-MCNC: 14.2 G/DL (ref 13–17.7)
LYMPHOCYTES # BLD MANUAL: 0.37 10*3/MM3 (ref 0.7–3.1)
LYMPHOCYTES NFR BLD MANUAL: 4 % (ref 19.6–45.3)
LYMPHOCYTES NFR BLD MANUAL: 5 % (ref 5–12)
MAGNESIUM SERPL-MCNC: 2.1 MG/DL (ref 1.6–2.4)
MCH RBC QN AUTO: 30.7 PG (ref 26.6–33)
MCHC RBC AUTO-ENTMCNC: 32.4 G/DL (ref 31.5–35.7)
MCV RBC AUTO: 94.8 FL (ref 79–97)
MONOCYTES # BLD AUTO: 0.47 10*3/MM3 (ref 0.1–0.9)
NEUTROPHILS # BLD AUTO: 8.46 10*3/MM3 (ref 1.7–7)
NEUTROPHILS NFR BLD MANUAL: 83 % (ref 42.7–76)
NEUTS BAND NFR BLD MANUAL: 8 % (ref 0–5)
PLAT MORPH BLD: NORMAL
PLATELET # BLD AUTO: 238 10*3/MM3 (ref 140–450)
PMV BLD AUTO: 8.9 FL (ref 6–12)
POTASSIUM SERPL-SCNC: 4.7 MMOL/L (ref 3.5–5.2)
PROT SERPL-MCNC: 6.3 G/DL (ref 6–8.5)
RBC # BLD AUTO: 4.61 10*6/MM3 (ref 4.14–5.8)
RBC MORPH BLD: NORMAL
SCAN SLIDE: NORMAL
SODIUM SERPL-SCNC: 135 MMOL/L (ref 136–145)
WBC # BLD AUTO: 9.3 10*3/MM3 (ref 3.4–10.8)
WBC MORPH BLD: NORMAL

## 2020-12-09 PROCEDURE — 86140 C-REACTIVE PROTEIN: CPT | Performed by: STUDENT IN AN ORGANIZED HEALTH CARE EDUCATION/TRAINING PROGRAM

## 2020-12-09 PROCEDURE — 85007 BL SMEAR W/DIFF WBC COUNT: CPT | Performed by: STUDENT IN AN ORGANIZED HEALTH CARE EDUCATION/TRAINING PROGRAM

## 2020-12-09 PROCEDURE — 99231 SBSQ HOSP IP/OBS SF/LOW 25: CPT | Performed by: HOSPITALIST

## 2020-12-09 PROCEDURE — 80053 COMPREHEN METABOLIC PANEL: CPT | Performed by: STUDENT IN AN ORGANIZED HEALTH CARE EDUCATION/TRAINING PROGRAM

## 2020-12-09 PROCEDURE — 25010000002 DEXAMETHASONE PER 1 MG: Performed by: INTERNAL MEDICINE

## 2020-12-09 PROCEDURE — 82550 ASSAY OF CK (CPK): CPT | Performed by: STUDENT IN AN ORGANIZED HEALTH CARE EDUCATION/TRAINING PROGRAM

## 2020-12-09 PROCEDURE — 94799 UNLISTED PULMONARY SVC/PX: CPT

## 2020-12-09 PROCEDURE — 82728 ASSAY OF FERRITIN: CPT | Performed by: STUDENT IN AN ORGANIZED HEALTH CARE EDUCATION/TRAINING PROGRAM

## 2020-12-09 PROCEDURE — 83735 ASSAY OF MAGNESIUM: CPT | Performed by: STUDENT IN AN ORGANIZED HEALTH CARE EDUCATION/TRAINING PROGRAM

## 2020-12-09 PROCEDURE — 85025 COMPLETE CBC W/AUTO DIFF WBC: CPT | Performed by: STUDENT IN AN ORGANIZED HEALTH CARE EDUCATION/TRAINING PROGRAM

## 2020-12-09 RX ADMIN — DOCUSATE SODIUM 100 MG: 100 CAPSULE, LIQUID FILLED ORAL at 21:13

## 2020-12-09 RX ADMIN — METOCLOPRAMIDE 10 MG: 10 TABLET ORAL at 12:56

## 2020-12-09 RX ADMIN — Medication 600 MG: at 09:04

## 2020-12-09 RX ADMIN — BUDESONIDE AND FORMOTEROL FUMARATE DIHYDRATE 2 PUFF: 160; 4.5 AEROSOL RESPIRATORY (INHALATION) at 07:56

## 2020-12-09 RX ADMIN — SIMETHICONE 120 MG: 80 TABLET, CHEWABLE ORAL at 16:41

## 2020-12-09 RX ADMIN — POTASSIUM CHLORIDE 20 MEQ: 1500 TABLET, EXTENDED RELEASE ORAL at 12:58

## 2020-12-09 RX ADMIN — LIDOCAINE 1 PATCH: 50 PATCH TOPICAL at 09:01

## 2020-12-09 RX ADMIN — TAMSULOSIN HYDROCHLORIDE 0.4 MG: 0.4 CAPSULE ORAL at 09:07

## 2020-12-09 RX ADMIN — MONTELUKAST 10 MG: 10 TABLET, FILM COATED ORAL at 21:15

## 2020-12-09 RX ADMIN — SIMETHICONE 120 MG: 80 TABLET, CHEWABLE ORAL at 09:02

## 2020-12-09 RX ADMIN — METOCLOPRAMIDE 10 MG: 10 TABLET ORAL at 16:39

## 2020-12-09 RX ADMIN — CLONIDINE HYDROCHLORIDE 0.1 MG: 0.1 TABLET ORAL at 21:15

## 2020-12-09 RX ADMIN — BUSPIRONE HYDROCHLORIDE 15 MG: 10 TABLET ORAL at 16:39

## 2020-12-09 RX ADMIN — BUSPIRONE HYDROCHLORIDE 15 MG: 10 TABLET ORAL at 21:13

## 2020-12-09 RX ADMIN — SIMETHICONE 120 MG: 80 TABLET, CHEWABLE ORAL at 21:17

## 2020-12-09 RX ADMIN — ALBUTEROL SULFATE 2 PUFF: 108 AEROSOL, METERED RESPIRATORY (INHALATION) at 18:55

## 2020-12-09 RX ADMIN — DILTIAZEM HYDROCHLORIDE 120 MG: 120 CAPSULE, COATED, EXTENDED RELEASE ORAL at 09:04

## 2020-12-09 RX ADMIN — OXYCODONE HYDROCHLORIDE AND ACETAMINOPHEN 1000 MG: 500 TABLET ORAL at 09:04

## 2020-12-09 RX ADMIN — DEXAMETHASONE SODIUM PHOSPHATE 6 MG: 4 INJECTION, SOLUTION INTRAMUSCULAR; INTRAVENOUS at 09:08

## 2020-12-09 RX ADMIN — ATORVASTATIN CALCIUM 40 MG: 40 TABLET, FILM COATED ORAL at 09:05

## 2020-12-09 RX ADMIN — BUDESONIDE AND FORMOTEROL FUMARATE DIHYDRATE 2 PUFF: 160; 4.5 AEROSOL RESPIRATORY (INHALATION) at 18:55

## 2020-12-09 RX ADMIN — ZINC SULFATE 220 MG (50 MG) CAPSULE 220 MG: CAPSULE at 09:04

## 2020-12-09 RX ADMIN — APIXABAN 10 MG: 5 TABLET, FILM COATED ORAL at 21:15

## 2020-12-09 RX ADMIN — DOCUSATE SODIUM 100 MG: 100 CAPSULE, LIQUID FILLED ORAL at 09:09

## 2020-12-09 RX ADMIN — METOCLOPRAMIDE 10 MG: 10 TABLET ORAL at 09:04

## 2020-12-09 RX ADMIN — CLONIDINE HYDROCHLORIDE 0.1 MG: 0.1 TABLET ORAL at 09:07

## 2020-12-09 RX ADMIN — ALBUTEROL SULFATE 2 PUFF: 108 AEROSOL, METERED RESPIRATORY (INHALATION) at 07:56

## 2020-12-09 RX ADMIN — Medication: at 09:08

## 2020-12-09 RX ADMIN — Medication: at 21:18

## 2020-12-09 RX ADMIN — ZINC SULFATE 220 MG (50 MG) CAPSULE 220 MG: CAPSULE at 21:14

## 2020-12-09 RX ADMIN — Medication 10 ML: at 21:17

## 2020-12-09 RX ADMIN — BUSPIRONE HYDROCHLORIDE 15 MG: 10 TABLET ORAL at 09:07

## 2020-12-09 RX ADMIN — ESCITALOPRAM OXALATE 5 MG: 10 TABLET ORAL at 09:04

## 2020-12-09 RX ADMIN — BUMETANIDE 1 MG: 1 TABLET ORAL at 09:07

## 2020-12-09 RX ADMIN — Medication 5000 UNITS: at 09:03

## 2020-12-09 RX ADMIN — GUAIFENESIN 600 MG: 600 TABLET, EXTENDED RELEASE ORAL at 12:56

## 2020-12-09 RX ADMIN — CYCLOBENZAPRINE HYDROCHLORIDE 5 MG: 10 TABLET, FILM COATED ORAL at 16:40

## 2020-12-09 RX ADMIN — APIXABAN 10 MG: 5 TABLET, FILM COATED ORAL at 09:04

## 2020-12-09 RX ADMIN — Medication 10 ML: at 09:08

## 2020-12-09 RX ADMIN — HYDROCODONE BITARTRATE AND ACETAMINOPHEN 1 TABLET: 5; 325 TABLET ORAL at 09:04

## 2020-12-09 RX ADMIN — GUAIFENESIN 600 MG: 600 TABLET, EXTENDED RELEASE ORAL at 21:16

## 2020-12-09 RX ADMIN — PANTOPRAZOLE SODIUM 40 MG: 40 TABLET, DELAYED RELEASE ORAL at 05:16

## 2020-12-09 RX ADMIN — ALBUTEROL SULFATE 2 PUFF: 108 AEROSOL, METERED RESPIRATORY (INHALATION) at 11:00

## 2020-12-09 RX ADMIN — Medication 5 MG: at 21:15

## 2020-12-09 RX ADMIN — Medication 600 MG: at 21:17

## 2020-12-09 RX ADMIN — DOCUSATE SODIUM 100 MG: 100 CAPSULE, LIQUID FILLED ORAL at 16:41

## 2020-12-09 RX ADMIN — CYCLOBENZAPRINE HYDROCHLORIDE 5 MG: 10 TABLET, FILM COATED ORAL at 21:14

## 2020-12-09 RX ADMIN — CETIRIZINE HYDROCHLORIDE 10 MG: 10 TABLET, FILM COATED ORAL at 21:13

## 2020-12-09 RX ADMIN — CYCLOBENZAPRINE HYDROCHLORIDE 5 MG: 10 TABLET, FILM COATED ORAL at 09:03

## 2020-12-09 RX ADMIN — ALBUTEROL SULFATE 2 PUFF: 108 AEROSOL, METERED RESPIRATORY (INHALATION) at 14:34

## 2020-12-09 RX ADMIN — HYDROCODONE BITARTRATE AND ACETAMINOPHEN 1 TABLET: 5; 325 TABLET ORAL at 22:50

## 2020-12-09 RX ADMIN — Medication 200 MG: at 09:03

## 2020-12-09 RX ADMIN — HYDROCODONE BITARTRATE AND ACETAMINOPHEN 1 TABLET: 5; 325 TABLET ORAL at 16:48

## 2020-12-09 RX ADMIN — TRAZODONE HYDROCHLORIDE 75 MG: 50 TABLET ORAL at 21:15

## 2020-12-09 NOTE — PROGRESS NOTES
Continued Stay Note   [facility]     Patient Name: Gordon Dowling  MRN: [id number]  Today's Date: 12/9/2020    Admit Date: 12/1/2020    Discharge Plan     Row Name 12/09/20 1557       Plan    Plan  DC Plan: Return to [facility]. Okay to return per facility/pt. No PASRR or pre-cert needed.    Plan Comments  DC Barrier: Pt weaned to 6L high flow currently, facility can take max of 10L. If maintains o2 sat, may be ready for dc 12/10.          Expected Discharge Date and Time     Expected Discharge Date Expected Discharge Time    Dec 10, 2020             Alexandra Coleman RN

## 2020-12-09 NOTE — PLAN OF CARE
Goal Outcome Evaluation:  Plan of Care Reviewed With: patient  Progress: improving  Outcome Summary: patient is on 7 liters highfklow o2 from 91-95% sats. Patient with back pain that  is controlled with medicaitons. assist x1 up to bcs and chair. patient sat up in chair for 2.5 hours.

## 2020-12-09 NOTE — PLAN OF CARE
Goal Outcome Evaluation:  Plan of Care Reviewed With: patient  Progress: improving  O2 decreased to 10 liters highflow. Tolerating well

## 2020-12-09 NOTE — PROGRESS NOTES
[facility] Medicine Services Daily Progress Note      Hospitalist Team  LOS 6 days      Patient Care Team:  Ba Loaiza MD as PCP - General (Geriatric Medicine)    Patient Location: 2125/1      Subjective   Subjective     Chief Complaint / Subjective  Chief Complaint   Patient presents with   • Shortness of Breath         Brief Synopsis of Hospital Course/HPI  HPI limited due to patient on Bipap mask, mostly taken from medical record review and ER documentation     Mr. Dowling is a 65 y.o. male with PMH of COPD on oxygen nocturnally, HTN, CAD, A.fib on Xarelto, CKD, CVA, GERD, anemia, and insomnia who presented to [facility] [place] [facility] [place] of shortness of breath and hypoxia. He was reported to have O2 saturation 69% on room air. He was placed on 2L O2 via NC and had O2 saturation 74%. He was placed on non-rebreather by EMS. He reported some left sided chest pain, cough. Per ER he had no fever, nausea, vomiting, or diarrhea but reported constipation. The patient tested positive at his facility on 11/23/2020 per ER documentation. He normally wears O2 at night.      In the ER patient had CT PE protocol that showed likely subacute pulmonary embolus within the distal main pulmonary artery on the left extending into the interlobar artery of the left lower lobe. Findings throughout the lungs likely related to Covid pneumonia. Emphysema. ABG showed ph 7.39, CO2 43.2, O2 64, HCO3 26.5 on 100% non-rebreather. He was placed on Bipap. WBC normal, temp 99.2. Respiratory viral panel WITHOUT Covid was negative. Troponin negative x2. Blood cultures were drawn. He was given IV rocephin, zithromax, decadron, 324mg aspirin, started treatment lovenox. He was admitted to the PCU for further treatment.      Date::      12/2/2020  Patient had been high flow oxygen.  Short of breath    12/3/2020  Still on high flow nasal cannula with precision flow device  Denies any nausea or  "vomiting or abdominal pain or chest pain  Switching to Eliquis since difficult to control his PTT with heparin drip  Discussed with nursing staff    12/4  Patient has persistent cough  Medications adjusted to help her symptoms  Events overnight reviewed  Decrease steroids  We will discuss with pulmonologist    12/5/2020  Patient lying down in bed without distress but has high flow precision cannula in place with 80%FiO2  Asked him to drink water  He denies abdominal pain but has chest pain  Previous troponin were done and were negative on he came in.  Likely related to his pneumonia and cough.  CRP decreasing    12/6  Patient seenStill complaining of shortness of breath.  Had intermittent dry cough.  Oxygen saturatPrecision flow cannulaion between 92 and 96Percent on 60%    12/7  O2 being titrated down, in no acute distress, remains on HFNC     12/8  HFNC continuing to be weaned down, no acute distress     ROS  All other systems reviewed and negative    Objective   Objective      Vital Signs  Temp:  [95.4 °F (35.2 °C)-98 °F (36.7 °C)] 98 °F (36.7 °C)  Heart Rate:  [] 90  Resp:  [16-33] 18  BP: (120-145)/(71-92) 131/87  Oxygen Therapy  SpO2: 96 %  Pulse Oximetry Type: Continuous  Device (Oxygen Therapy): high-flow nasal cannula  $ High Flow Nasal Cannula Set-Up: yes  Flow (L/min): 10  Oxygen Concentration (%): 40  Flowsheet Rows      First Filed Value   Admission Height  180.3 cm (71\") Documented at 11/30/2020 2358   Admission Weight  104 kg (230 lb) Documented at 11/30/2020 2358        Intake & Output (last 3 days)       12/06 0701 - 12/07 0700 12/07 0701 - 12/08 0700 12/08 0701 - 12/09 0700    P.O. 840 720     Total Intake(mL/kg) 840 (10) 720 (8.6)     Urine (mL/kg/hr) 1600 (0.8) 1050 (0.5) 425 (0.4)    Stool       Total Output 1600 1050 425    Net -760 -330 -425           Urine Unmeasured Occurrence 1 x          Lines, Drains & Airways    Active LDAs     Name:   Placement date:   Placement time:   Site:   " Days:    Peripheral IV 12/01/20 0039 Right Antecubital   12/01/20 0039    Antecubital   1                  Physical Exam:    Physical Exam  Vitals signs and nursing note reviewed.   Constitutional:       General: He is not in acute distress.     Appearance: Normal appearance. He is well-developed. He is not ill-appearing, toxic-appearing or diaphoretic.   HENT:      Head: Normocephalic and atraumatic.      Right Ear: Ear canal and external ear normal.      Left Ear: Ear canal and external ear normal.      Nose: Nose normal. No congestion or rhinorrhea.      Mouth/Throat:      Mouth: Mucous membranes are moist.      Pharynx: No oropharyngeal exudate.   Eyes:      General: No scleral icterus.        Right eye: No discharge.         Left eye: No discharge.      Extraocular Movements: Extraocular movements intact.      Conjunctiva/sclera: Conjunctivae normal.      Pupils: Pupils are equal, round, and reactive to light.   Neck:      Musculoskeletal: Normal range of motion and neck supple. No neck rigidity or muscular tenderness.      Thyroid: No thyromegaly.      Vascular: No carotid bruit or JVD.      Trachea: No tracheal deviation.   Cardiovascular:      Rate and Rhythm: Normal rate and regular rhythm.      Pulses: Normal pulses.      Heart sounds: Normal heart sounds. No murmur. No friction rub. No gallop.    Pulmonary:      Effort: Accessory muscle usage and respiratory distress present.      Breath sounds: No stridor. Decreased breath sounds and rales present. No wheezing or rhonchi.   Chest:      Chest wall: No tenderness.   Abdominal:      General: Bowel sounds are normal. There is no distension.      Palpations: Abdomen is soft. There is no mass.      Tenderness: There is no abdominal tenderness. There is no guarding or rebound.      Hernia: No hernia is present.   Musculoskeletal: Normal range of motion.         General: No swelling, tenderness, deformity or signs of injury.      Right lower leg: No edema.       Left lower leg: No edema.   Lymphadenopathy:      Cervical: No cervical adenopathy.   Skin:     General: Skin is warm and dry.      Coloration: Skin is not jaundiced or pale.      Findings: No bruising, erythema or rash.   Neurological:      General: No focal deficit present.      Mental Status: He is alert and oriented to person, place, and time. Mental status is at baseline.      Cranial Nerves: No cranial nerve deficit.      Sensory: No sensory deficit.      Motor: No weakness or abnormal muscle tone.      Coordination: Coordination normal.   Psychiatric:         Mood and Affect: Mood normal.         Behavior: Behavior normal.         Thought Content: Thought content normal.         Judgment: Judgment normal.               Procedures:              Results Review:     I reviewed the patient's new clinical results.      Lab Results (last 24 hours)     Procedure Component Value Units Date/Time    C-reactive Protein [[id number]] Collected: 12/08/20 0443    Specimen: Blood Updated: 12/08/20 0451    C-reactive Protein [[id number]]  (Abnormal) Collected: 12/07/20 0300    Specimen: Blood Updated: 12/08/20 0407     C-Reactive Protein 24 mg/L     Narrative:      Performed at:  [address] [place] [address]  : Dino Quintana PhD, Phone:  [phone]    CBC & Differential [[id number]] Collected: 12/08/20 0220    Specimen: Blood Updated: 12/08/20 0326    Narrative:      The following orders were created for panel order CBC & Differential.  Procedure                               Abnormality         Status                     ---------                               -----------         ------                     Scan Slide[[id number]]                                       Final result               CBC Auto Differential[[id number]]        Normal              Final result                 Please view results for these tests on the individual orders.    CBC Auto Differential [[id number]]   (Normal) Collected: 12/08/20 0220    Specimen: Blood Updated: 12/08/20 0326     WBC 9.10 10*3/mm3      RBC 4.86 10*6/mm3      Hemoglobin 14.9 g/dL      Hematocrit 45.7 %      MCV 94.1 fL      MCH 30.7 pg      MCHC 32.6 g/dL      RDW 15.1 %      RDW-SD 50.3 fl      MPV 8.8 fL      Platelets 206 10*3/mm3     Scan Slide [[id number]] Collected: 12/08/20 0220    Specimen: Blood Updated: 12/08/20 0326     Scan Slide --     Comment: See Manual Differential Results       Manual Differential [[id number]]  (Abnormal) Collected: 12/08/20 0220    Specimen: Blood Updated: 12/08/20 0326     Neutrophil % 83.0 %      Lymphocyte % 11.0 %      Monocyte % 4.0 %      Bands %  2.0 %      Neutrophils Absolute 7.74 10*3/mm3      Lymphocytes Absolute 1.00 10*3/mm3      Monocytes Absolute 0.36 10*3/mm3      RBC Morphology Normal     WBC Morphology Normal     Platelet Estimate Adequate     Large Platelets Slight/1+    Ferritin [[id number]]  (Normal) Collected: 12/08/20 0220    Specimen: Blood Updated: 12/08/20 0326     Ferritin 190.80 ng/mL     Narrative:      Results may be falsely decreased if patient taking Biotin.      Comprehensive Metabolic Panel [[id number]]  (Abnormal) Collected: 12/08/20 0220    Specimen: Blood Updated: 12/08/20 0321     Glucose 136 mg/dL      BUN 49 mg/dL      Creatinine 0.96 mg/dL      Sodium 135 mmol/L      Potassium 4.9 mmol/L      Chloride 102 mmol/L      CO2 22.0 mmol/L      Calcium 9.3 mg/dL      Total Protein 7.0 g/dL      Albumin 3.40 g/dL      ALT (SGPT) 55 U/L      AST (SGOT) 28 U/L      Alkaline Phosphatase 116 U/L      Total Bilirubin 0.6 mg/dL      eGFR Non African Amer 78 mL/min/1.73      Globulin 3.6 gm/dL      A/G Ratio 0.9 g/dL      BUN/Creatinine Ratio 51.0     Anion Gap 11.0 mmol/L     Narrative:      GFR Normal >60  Chronic Kidney Disease <60  Kidney Failure <15      CK [[id number]]  (Normal) Collected: 12/08/20 0220    Specimen: Blood Updated: 12/08/20 0321     Creatine Kinase 54 U/L     Lactate  Dehydrogenase [[id number]]  (Abnormal) Collected: 12/08/20 0220    Specimen: Blood Updated: 12/08/20 0321      U/L     Magnesium [[id number]]  (Normal) Collected: 12/08/20 0220    Specimen: Blood Updated: 12/08/20 0321     Magnesium 2.1 mg/dL     D-dimer, Quantitative [[id number]]  (Abnormal) Collected: 12/08/20 0220    Specimen: Blood Updated: 12/08/20 0251     D-Dimer, Quantitative 0.81 mg/L (FEU)     Narrative:      Reference Range  --------------------------------------------------------------------     < 0.50   Negative Predictive Value  0.50-0.59   Indeterminate    >= 0.60   Probable VTE             A very low percentage of patients with DVT may yield D-Dimer results   below the cut-off of 0.50 mg/L FEU.  This is known to be more   prevalent in patients with distal DVT.             Results of this test should always be interpreted in conjunction with   the patient's medical history, clinical presentation and other   findings.  Clinical diagnosis should not be based on the result of   INNOVANCE D-Dimer alone.    Fibrinogen [[id number]]  (Abnormal) Collected: 12/08/20 0220    Specimen: Blood Updated: 12/08/20 0251     Fibrinogen 525 mg/dL         No results found for: HGBA1C            No results found for: LIPASE  No results found for: CHOL, CHLPL, TRIG, HDL, LDL, LDLDIRECT    No results found for: INTRAOP, PREDX, FINALDX, COMDX    Microbiology Results (last 10 days)     Procedure Component Value - Date/Time    Respiratory Panel PCR w/COVID-19(SARS-CoV-2) ARNAV/PURNIMA/HOLLY/PAD/COR/MAD/GLADYS In-House, NP Swab in UT/VTM, 3-4 HR TAT - Swab, Nasopharynx [[id number]]  (Abnormal) Collected: 12/02/20 0548    Lab Status: Final result Specimen: Swab from Nasopharynx Updated: 12/02/20 0659     ADENOVIRUS, PCR Not Detected     Coronavirus 229E Not Detected     Coronavirus HKU1 Not Detected     Coronavirus NL63 Not Detected     Coronavirus OC43 Not Detected     COVID19 Detected     Human Metapneumovirus Not Detected     Human  Rhinovirus/Enterovirus Not Detected     Influenza A PCR Not Detected     Influenza B PCR Not Detected     Parainfluenza Virus 1 Not Detected     Parainfluenza Virus 2 Not Detected     Parainfluenza Virus 3 Not Detected     Parainfluenza Virus 4 Not Detected     RSV, PCR Not Detected     Bordetella pertussis pcr Not Detected     Bordetella parapertussis PCR Not Detected     Chlamydophila pneumoniae PCR Not Detected     Mycoplasma pneumo by PCR Not Detected    Narrative:      Fact sheet for providers: https://docs.[employer]/wp-content/uploads/YLF9998-4980-IM4.1-EUA-Provider-Fact-Sheet-3.pdf    Fact sheet for patients: https://docs.[employer]/wp-content/uploads/CPJ3398-9850-HF6.1-EUA-Patient-Fact-Sheet-1.pdf    Respiratory Panel, PCR (WITHOUT COVID) - Swab, Nasopharynx [[id number]]  (Normal) Collected: 12/01/20 0507    Lab Status: Final result Specimen: Swab from Nasopharynx Updated: 12/01/20 0704     ADENOVIRUS, PCR Not Detected     Coronavirus 229E Not Detected     Coronavirus HKU1 Not Detected     Coronavirus NL63 Not Detected     Coronavirus OC43 Not Detected     Human Metapneumovirus Not Detected     Human Rhinovirus/Enterovirus Not Detected     Influenza B PCR Not Detected     Parainfluenza Virus 1 Not Detected     Parainfluenza Virus 2 Not Detected     Parainfluenza Virus 3 Not Detected     Parainfluenza Virus 4 Not Detected     Bordetella pertussis pcr Not Detected     Influenza A H1 2009 PCR Not Detected     Chlamydophila pneumoniae PCR Not Detected     Mycoplasma pneumo by PCR Not Detected     Influenza A PCR Not Detected     Influenza A H3 Not Detected     Influenza A H1 Not Detected     RSV, PCR Not Detected     Bordetella parapertussis PCR Not Detected    Narrative:      The coronavirus on the RVP is NOT COVID-19 and is NOT indicative of infection with COVID-19.     Blood Culture - Blood, Arm, Right [[id number]] Collected: 12/01/20 0020    Lab Status: Final result Specimen: Blood from Arm, Right  Updated: 12/06/20 0030     Blood Culture No growth at 5 days    Blood Culture - Blood, Arm, Left [[id number]]  (Abnormal) Collected: 12/01/20 0020    Lab Status: Final result Specimen: Blood from Arm, Left Updated: 12/02/20 1309     Blood Culture Staphylococcus, coagulase negative     Comment: Probable contaminant requires clinical correlation, susceptibility not performed unless requested by physician.          Isolated from Aerobic Bottle     Gram Stain Aerobic Bottle Gram positive cocci in clusters    Blood Culture ID, PCR - Blood, Arm, Left [[id number]]  (Abnormal) Collected: 12/01/20 0020    Lab Status: Final result Specimen: Blood from Arm, Left Updated: 12/01/20 2241     BCID, PCR Staphylococcus spp, not aureus. Identification by BCID PCR.          ECG/EMG Results (most recent)     Procedure Component Value Units Date/Time    ECG 12 Lead [[id number]]  (Abnormal) Resulted: 12/01/20 0256     Updated: 12/01/20 0256    ECG 12 Lead [[id number]] Collected: 12/01/20 0012     Updated: 12/01/20 1524     QT Interval 447 ms     Narrative:      HEART RATE= 91  bpm  RR Interval= 659  ms  SC Interval=   ms  P Horizontal Axis=   deg  P Front Axis=   deg  QRSD Interval= 93  ms  QT Interval= 447  ms  QRS Axis= -36  deg  T Wave Axis= -23  deg  - ABNORMAL ECG -  Atrial fibrillation  Inferior infarct, age indeterminate  Anterior infarct, old  Prolonged QT interval  No previous ECG available for comparison  Electronically Signed By: Ryne Portillo (HOLLY) 01-Dec-2020 15:22:22  Date and Time of Study: 2020-12-01 00:12:20    ECG 12 Lead [[id number]] Collected: 12/04/20 0525     Updated: 12/04/20 0527     QT Interval 391 ms     Narrative:      HEART RATE= 75  bpm  RR Interval= 801  ms  SC Interval=   ms  P Horizontal Axis=   deg  P Front Axis=   deg  QRSD Interval= 97  ms  QT Interval= 391  ms  QRS Axis= -37  deg  T Wave Axis= -5  deg  - ABNORMAL ECG -  Atrial fibrillation  Inferior infarct, old  Anterior infarct, old  Electronically Signed  By:   Date and Time of Study: 2020-12-04 05:25:33                    Xr Chest 1 View    Result Date: 12/1/2020  Diffuse bilateral infiltrates consistent with multifocal pneumonia. Electronically signed by:  Naveed Welch M.D.  12/1/2020 12:26 AM    Ct Chest Pulmonary Embolism    Result Date: 12/1/2020  1. Likely subacute pulmonary embolus within the distal main pulmonary artery on the left extending into the interlobar artery of the left lower lobe. 2. No evidence of thoracic aortic aneurysm or dissection. 3. Findings throughout the lungs likely related to a Covid pneumonia given the patient's clinical history. Follow-up to clearing recommended. Likely area of rounded atelectasis, left lower lobe. 4. Emphysema. These critical findings were discussed with JT Kimball Electronically signed by:  Costa Lou D.O.  12/1/2020 12:34 AM          Xrays, labs reviewed personally by physician.    Medication Review:   I have reviewed the patient's current medication list      Scheduled Meds  Acetylcysteine, 600 mg, Oral, BID  albuterol sulfate HFA, 2 puff, Inhalation, 4x Daily - RT  ammonium lactate, , Topical, Q12H  apixaban, 10 mg, Oral, BID    Followed by  [START ON 12/10/2020] apixaban, 5 mg, Oral, BID  atorvastatin, 40 mg, Oral, Daily  budesonide-formoterol, 2 puff, Inhalation, BID - RT  bumetanide, 1 mg, Oral, Daily  busPIRone, 15 mg, Oral, TID  cetirizine, 10 mg, Oral, Nightly  cloNIDine, 0.1 mg, Oral, Q12H  cyclobenzaprine, 5 mg, Oral, TID  dexamethasone, 6 mg, Intravenous, Daily  dilTIAZem CD, 120 mg, Oral, Daily  docusate sodium, 100 mg, Oral, TID  escitalopram, 5 mg, Oral, Daily  guaiFENesin, 600 mg, Oral, Q12H  lidocaine, 1 patch, Transdermal, Q24H  melatonin, 10 mg, Oral, Nightly  metoclopramide, 10 mg, Oral, TID AC  montelukast, 10 mg, Oral, Nightly  pantoprazole, 40 mg, Oral, Q AM  potassium chloride, 20 mEq, Oral, Daily With Lunch  simethicone, 120 mg, Oral, TID  sodium chloride, 10 mL, Intravenous,  Q12H  tamsulosin, 0.4 mg, Oral, Daily  thiamine, 200 mg, Oral, Daily  traZODone, 75 mg, Oral, Nightly  vitamin C, 1,000 mg, Oral, Daily  vitamin D3, 5,000 Units, Oral, Daily  zinc sulfate, 220 mg, Oral, BID        Meds Infusions  Pharmacy Consult - Pharmacy to dose,         Meds PRN  •  acetaminophen **OR** acetaminophen **OR** acetaminophen  •  acetaminophen  •  benzonatate  •  bismuth subsalicylate  •  fleet enema  •  HYDROcodone-acetaminophen  •  lactulose  •  magnesium sulfate **OR** magnesium sulfate in D5W 1g/100mL (PREMIX)  •  melatonin  •  nitroglycerin  •  ondansetron **OR** ondansetron  •  Pharmacy Consult - Pharmacy to dose  •  polyethylene glycol  •  potassium chloride **OR** potassium chloride **OR** potassium chloride  •  sodium chloride  •  sodium chloride  •  sodium chloride  •  witch hazel-glycerin        Assessment/Plan   Assessment/Plan     Active Hospital Problems    Diagnosis  POA   • **Acute respiratory failure due to COVID-19 (CMS/[facility]) [U07.1, J96.00]  Yes   • Pneumonia of both lungs due to infectious organism [J18.9]  Unknown   • Subacute pulmonary embolism (CMS/[facility]) [I26.99]  Unknown   • Renal disease [N28.9]  Yes   • CAD (coronary artery disease) [I25.10]  Yes   • Hypertension [I10]  Yes   • Stroke (CMS/[facility]) [I63.9]  Yes   • Mood disorder (CMS/[facility]) [F39]  Yes   • GERD (gastroesophageal reflux disease) [K21.9]  Yes   • Afib (CMS/[facility]) [I48.91]  Yes   • Insomnia [G47.00]  Yes   • BPH (benign prostatic hyperplasia) [N40.0]  Yes      Resolved Hospital Problems   No resolved problems to display.       MEDICAL DECISION MAKING COMPLEXITY BY PROBLEM:        Acute respiratory failure due to Covid-19, Pneumonia due to Covid-19  -ABG: showed ph 7.39, CO2 43.2, O2 64, HCO3 26.5 on 100% non-rebreather  -currently on Bipap at 100%  -CT PE protocol: likely subacute pulmonary embolus within the distal main pulmonary artery on the left extending into the interlobar artery of the left lower lobe. Findings  throughout the lungs likely related to Covid pneumonia. Emphysema  -, ferritin normal, procalcitonin 0.39, d-dimer 0.62, WBC normal, temp 99.2  -given IV rocephin, zithromax, and decadron in the ER  On Decadron and Remdesivir  De-escalate antibiotics.  Now on doxycycline  On Bumex  Appreciate pulmonary input    Acute pulmonary embolus  -CT PE protocol as above  -Initially received heparin drip  Switch to Eliquis.      Positive blood culture likely contamination with coagulase-negative staph x1    CKD  -creatinine 1.31  -monitor BMP     COPD with acute exacerbation  -on oxygen at night at baseline  -tx as above     Chronic atrial fibrillation  -cont home cardizem  Now on Eliquis.  Discontinue Xarelto on discharge     Hypertension  -cont home cardizem,   Decrease dose of clonidine  Holding losartan     Previous CVA  -on chronic anticoagulation,   On statin       Depression  -cont home buspar, lexapro     Chronic pain  -on norco (Verified by Rx by [facility])     Chronic constipation  -prn laxatives      BPH  -cont home flomax        VTE Prophylaxis - switch to Eliquis       VTE Prophylaxis -   Mechanical Order History:     None      Pharmalogical Order History:      Ordered     Dose Route Frequency Stop    12/01/20 0309  enoxaparin (LOVENOX) syringe 40 mg  Status:  Discontinued      40 mg SC Every 24 Hours 12/01/20 1041    12/01/20 1042  heparin 12474 units/250 mL (100 units/mL) in 0.45 % NaCl infusion  14.97 mL/hr      14.4 Units/kg/hr IV Titrated --    12/01/20 1042  heparin bolus from bag 4,200 Units      40 Units/kg IV Every 6 Hours PRN --    12/01/20 1042  heparin bolus from bag 8,300 Units      80 Units/kg IV Every 6 Hours PRN --    12/01/20 0238  enoxaparin (LOVENOX) syringe 100 mg  Status:  Discontinued      1 mg/kg SC Once 12/01/20 0239                  Code Status -   Code Status and Medical Interventions:   Ordered at: 12/01/20 0308     Code Status:    CPR     Medical Interventions (Level of Support  Prior to Arrest):    Full       This patient has been examined wearing appropriate Personal Protective Equipment and discussed with hospital infection control department. 12/08/20        Discharge Planning  Pending clinical improvement, O2 needs being titrated down      Electronically signed by Svitlana Collins MD, 12/08/20, 20:17 EST.  [facility] Hospitalist Team

## 2020-12-09 NOTE — PROGRESS NOTES
"PULMONARY CRITICAL CARE Progress  NOTE      PATIENT IDENTIFICATION:  Name: Gordon Dowling  MRN: [id number]  :  1955     Age: 65 y.o.  Sex: male    DATE OF Note:  2020   Referring Physician: Svitlana Collins MD                  Subjective:   Per staff still on oxygen but no SOB no chest pain, no nausea or vomiting, no change in bowel habit, no dysuria,  no new  skin rash or itching.      Objective:  tMax 24 hrs: Temp (24hrs), Av.4 °F (36.3 °C), Min:96.6 °F (35.9 °C), Max:98.1 °F (36.7 °C)      Vitals Ranges:   Temp:  [96.6 °F (35.9 °C)-98.1 °F (36.7 °C)] 98.1 °F (36.7 °C)  Heart Rate:  [] 92  Resp:  [16-30] 18  BP: (120-158)/() 140/86    Intake and Output Last 3 Shifts:   I/O last 3 completed shifts:  In: -   Out: 1100 [Urine:1100]    Exam:  /86   Pulse 92   Temp 98.1 °F (36.7 °C) (Oral)   Resp 18   Ht 180.3 cm (71\")   Wt 85 kg (187 lb 6.3 oz)   SpO2 92%   BMI 26.14 kg/m²     General Appearance:   Alert awake confused saying a few words  HEENT:  Normocephalic, without obvious abnormality, Conjunctiva/corneas clear,.  Normal external ear canals, Nares normal, no drainage     Neck:  Supple, symmetrical, trachea midline. No JVD.  Lungs /Chest wall:   Bilateral basal rhonchi, respirations unlabored symmetrical wall movement.     Heart:  Regular rate and rhythm, systolic murmur PMI left sternal border  Abdomen: Soft, non-tender, no masses, no organomegaly.    Extremities: Trace edema no clubbing or Cyanosis        Medications:    Current Facility-Administered Medications:   •  acetaminophen (TYLENOL) tablet 650 mg, 650 mg, Oral, Q4H PRN **OR** acetaminophen (TYLENOL) 160 MG/5ML solution 650 mg, 650 mg, Oral, Q4H PRN **OR** acetaminophen (TYLENOL) suppository 650 mg, 650 mg, Rectal, Q4H PRN, Mary Russo APRN  •  acetaminophen (TYLENOL) tablet 650 mg, 650 mg, Oral, Q6H PRN, Ondina Lawson APRN  •  Acetylcysteine capsule 600 mg, 600 mg, Oral, BID, Draw, MD Kae, 600 mg " at 12/09/20 0904  •  albuterol sulfate HFA (PROVENTIL HFA;VENTOLIN HFA;PROAIR HFA) inhaler 2 puff, 2 puff, Inhalation, 4x Daily - RT, Ondina Lawson APRN, 2 puff at 12/09/20 1434  •  ammonium lactate (LAC-HYDRIN) 12 % lotion, , Topical, Q12H, Ondina Lawson, APRN, Given at 12/09/20 0908  •  apixaban (ELIQUIS) tablet 10 mg, 10 mg, Oral, BID, 10 mg at 12/09/20 0904 **FOLLOWED BY** [START ON 12/10/2020] apixaban (ELIQUIS) tablet 5 mg, 5 mg, Oral, BID, FranklynMono MD  •  atorvastatin (LIPITOR) tablet 40 mg, 40 mg, Oral, Daily, Mono Estes MD, 40 mg at 12/09/20 0905  •  benzonatate (TESSALON) capsule 100 mg, 100 mg, Oral, Q8H PRN, Ondina Lawson, APRN, 100 mg at 12/04/20 0504  •  bismuth subsalicylate (PEPTO BISMOL) chewable tablet 524 mg, 524 mg, Oral, Q4H PRN, Narciso Lawsonle, APRN  •  budesonide-formoterol (SYMBICORT) 160-4.5 MCG/ACT inhaler 2 puff, 2 puff, Inhalation, BID - RT, Ondina Lawson APRN, 2 puff at 12/09/20 0756  •  bumetanide (BUMEX) tablet 1 mg, 1 mg, Oral, Daily, Narciso Lawsonle, APRN, 1 mg at 12/09/20 0907  •  busPIRone (BUSPAR) tablet 15 mg, 15 mg, Oral, TID, Narciso Lawsonle, APRN, 15 mg at 12/09/20 1639  •  cetirizine (zyrTEC) tablet 10 mg, 10 mg, Oral, Nightly, Narciso Lawsonle, APRN, 10 mg at 12/08/20 2021  •  cloNIDine (CATAPRES) tablet 0.1 mg, 0.1 mg, Oral, Q12H, Mono Estes MD, 0.1 mg at 12/09/20 0907  •  cyclobenzaprine (FLEXERIL) tablet 5 mg, 5 mg, Oral, TID, Ondina Lawson APRN, 5 mg at 12/09/20 1640  •  dexamethasone (DECADRON) injection 6 mg, 6 mg, Intravenous, Daily, Mono Estes MD, 6 mg at 12/09/20 0908  •  dilTIAZem CD (CARDIZEM CD) 24 hr capsule 120 mg, 120 mg, Oral, Daily, Ondina Lawson APRN, 120 mg at 12/09/20 0904  •  docusate sodium (COLACE) capsule 100 mg, 100 mg, Oral, TID, Ondina Lawson APRN, 100 mg at 12/09/20 1641  •   escitalopram (LEXAPRO) tablet 5 mg, 5 mg, Oral, Daily, Ondina Lawson, APRN, 5 mg at 12/09/20 0904  •  fleet enema 1 enema, 1 enema, Rectal, Daily PRN, Ondina Lawson APRN  •  guaiFENesin (MUCINEX) 12 hr tablet 600 mg, 600 mg, Oral, Q12H, Ondina Lawson, APRN, 600 mg at 12/09/20 1256  •  HYDROcodone-acetaminophen (NORCO) 5-325 MG per tablet 1 tablet, 1 tablet, Oral, Q6H PRN, Ondina Lawson, APRN, 1 tablet at 12/09/20 1648  •  lactulose (CHRONULAC) 10 GM/15ML solution 20 g, 20 g, Oral, Daily PRN, Ondina Lawson, APRN  •  lidocaine (LIDODERM) 5 % 1 patch, 1 patch, Transdermal, Q24H, Ondina Lawson APRN, 1 patch at 12/09/20 0901  •  Magnesium Sulfate 2 gram infusion - Mg less than or equal to 1.5 mg/dL, 2 g, Intravenous, PRN **OR** Magnesium Sulfate 1 gram infusion - Mg 1.6-1.9 mg/dL, 1 g, Intravenous, PRN, Mary Russo APRN  •  melatonin tablet 10 mg, 10 mg, Oral, Nightly, FranklynMono MD, 10 mg at 12/08/20 2023  •  melatonin tablet 5 mg, 5 mg, Oral, Nightly PRN, Mary Russo APRN  •  metoclopramide (REGLAN) tablet 10 mg, 10 mg, Oral, TID AC, Ondina Lawson, APRN, 10 mg at 12/09/20 1639  •  montelukast (SINGULAIR) tablet 10 mg, 10 mg, Oral, Nightly, Ondina Lawson APRN, 10 mg at 12/08/20 2021  •  nitroglycerin (NITROSTAT) SL tablet 0.4 mg, 0.4 mg, Sublingual, Q5 Min PRN, Mary Russo APRN  •  ondansetron (ZOFRAN) tablet 4 mg, 4 mg, Oral, Q6H PRN **OR** ondansetron (ZOFRAN) injection 4 mg, 4 mg, Intravenous, Q6H PRN, Mary Russo APRN  •  pantoprazole (PROTONIX) EC tablet 40 mg, 40 mg, Oral, Q AM, Ondina Lawson APRN, 40 mg at 12/09/20 0516  •  Pharmacy Consult - Pharmacy to dose, , Does not apply, Continuous PRN, Ondina Lawson APRN  •  polyethylene glycol (MIRALAX) packet 17 g, 17 g, Oral, Daily PRN, Ondina Lawson APRN, 17 g at 12/04/20 0504  •  potassium chloride (K-DUR,KLOR-CON) CR tablet 20 mEq, 20  mEq, Oral, Daily With Lunch, Narciso Lawsonle, APRN, 20 mEq at 12/09/20 1258  •  potassium chloride (K-DUR,KLOR-CON) CR tablet 40 mEq, 40 mEq, Oral, PRN **OR** potassium chloride (KLOR-CON) packet 40 mEq, 40 mEq, Oral, PRN **OR** potassium chloride 10 mEq in 100 mL IVPB, 10 mEq, Intravenous, Q1H PRN, Russo Mary, APRN  •  simethicone (MYLICON) chewable tablet 120 mg, 120 mg, Oral, TID, Achterberg, Ondina, APRN, 120 mg at 12/09/20 1641  •  sodium chloride 0.9 % flush 10 mL, 10 mL, Intravenous, PRN, Mariana Rodriguez MD  •  sodium chloride 0.9 % flush 10 mL, 10 mL, Intravenous, PRN, Jackelyn Collado PA  •  sodium chloride 0.9 % flush 10 mL, 10 mL, Intravenous, Q12H, Karan Mary, APRN, 10 mL at 12/09/20 0908  •  sodium chloride 0.9 % flush 10 mL, 10 mL, Intravenous, PRN, Russo Mary, APRN  •  tamsulosin (FLOMAX) 24 hr capsule 0.4 mg, 0.4 mg, Oral, Daily, JinterNarciso winchesterle, APRN, 0.4 mg at 12/09/20 0907  •  thiamine (VITAMIN B-1) tablet 200 mg, 200 mg, Oral, Daily, Mono Estes MD, 200 mg at 12/09/20 0903  •  traZODone (DESYREL) tablet 75 mg, 75 mg, Oral, Nightly, Jinterannabella Ondina, APRN, 75 mg at 12/08/20 2022  •  vitamin C (ASCORBIC ACID) tablet 1,000 mg, 1,000 mg, Oral, Daily, DrawKae MD, 1,000 mg at 12/09/20 0904  •  witch hazel-glycerin (TUCKS) pad, , Topical, PRN, Achterberg, Ondina, APRN  •  zinc sulfate (ZINCATE) capsule 220 mg, 220 mg, Oral, BID, Mono Estesief, MD, 220 mg at 12/09/20 0904    Data Review:  All labs (24hrs):   Recent Results (from the past 24 hour(s))   Comprehensive Metabolic Panel    Collection Time: 12/09/20  4:35 AM    Specimen: Blood   Result Value Ref Range    Glucose 130 (H) 65 - 99 mg/dL    BUN 51 (H) 8 - 23 mg/dL    Creatinine 1.33 (H) 0.76 - 1.27 mg/dL    Sodium 135 (L) 136 - 145 mmol/L    Potassium 4.7 3.5 - 5.2 mmol/L    Chloride 102 98 - 107 mmol/L    CO2 25.0 22.0 - 29.0 mmol/L    Calcium 8.8 8.6 - 10.5 mg/dL     Total Protein 6.3 6.0 - 8.5 g/dL    Albumin 3.20 (L) 3.50 - 5.20 g/dL    ALT (SGPT) 48 (H) 1 - 41 U/L    AST (SGOT) 24 1 - 40 U/L    Alkaline Phosphatase 116 39 - 117 U/L    Total Bilirubin 0.5 0.0 - 1.2 mg/dL    eGFR Non African Amer 54 (L) >60 mL/min/1.73    Globulin 3.1 gm/dL    A/G Ratio 1.0 g/dL    BUN/Creatinine Ratio 38.3 (H) 7.0 - 25.0    Anion Gap 8.0 5.0 - 15.0 mmol/L   CK    Collection Time: 12/09/20  4:35 AM    Specimen: Blood   Result Value Ref Range    Creatine Kinase 64 20 - 200 U/L   Ferritin    Collection Time: 12/09/20  4:35 AM    Specimen: Blood   Result Value Ref Range    Ferritin 189.70 30.00 - 400.00 ng/mL   Magnesium    Collection Time: 12/09/20  4:35 AM    Specimen: Blood   Result Value Ref Range    Magnesium 2.1 1.6 - 2.4 mg/dL   CBC Auto Differential    Collection Time: 12/09/20  4:35 AM    Specimen: Blood   Result Value Ref Range    WBC 9.30 3.40 - 10.80 10*3/mm3    RBC 4.61 4.14 - 5.80 10*6/mm3    Hemoglobin 14.2 13.0 - 17.7 g/dL    Hematocrit 43.7 37.5 - 51.0 %    MCV 94.8 79.0 - 97.0 fL    MCH 30.7 26.6 - 33.0 pg    MCHC 32.4 31.5 - 35.7 g/dL    RDW 15.2 12.3 - 15.4 %    RDW-SD 50.8 37.0 - 54.0 fl    MPV 8.9 6.0 - 12.0 fL    Platelets 238 140 - 450 10*3/mm3   Scan Slide    Collection Time: 12/09/20  4:35 AM    Specimen: Blood   Result Value Ref Range    Scan Slide     Manual Differential    Collection Time: 12/09/20  4:35 AM    Specimen: Blood   Result Value Ref Range    Neutrophil % 83.0 (H) 42.7 - 76.0 %    Lymphocyte % 4.0 (L) 19.6 - 45.3 %    Monocyte % 5.0 5.0 - 12.0 %    Bands %  8.0 (H) 0.0 - 5.0 %    Neutrophils Absolute 8.46 (H) 1.70 - 7.00 10*3/mm3    Lymphocytes Absolute 0.37 (L) 0.70 - 3.10 10*3/mm3    Monocytes Absolute 0.47 0.10 - 0.90 10*3/mm3    RBC Morphology Normal Normal    WBC Morphology Normal Normal    Platelet Morphology Normal Normal        Imaging:  CT Chest Pulmonary Embolism  Narrative: Exam: CT Angiography of the Chest.    Date: 12/1/2020.     Comparison:  None    History: Shortness of breath with Covid positive test. Elevated d-dimer.    Technique: CT examination of the chest was performed following the intravenous administration of 100 mm of Isovue-370. CT dose lowering techniques were used, to include: automated exposure control, adjustment for patient size, and/or use of iterative   reconstruction.    FINDINGS:    Mediastinum and Nata: There are several slightly enlarged right hilar lymph nodes which could be reactive.    Pleural and Pericardial spaces: There are no pleural or pericardial effusions.    Upper Abdomen: There is a 4 cm cyst is partially visualized in the upper pole the right kidney. The visualized upper abdomen otherwise appears unremarkable.    Cardiovascular: There are midline sternotomy wires. There are severe coronary artery calcifications.    Pulmonary Artery: There is peripheral thrombus seen within the distal main pulmonary artery extending into the interlobar artery of the right lower lobe which is likely subacute.    Lung Parenchyma and Airways: Scattered groundglass areas of opacity are seen throughout the lungs bilaterally which are likely related to a Covid pneumonia given the patient's clinical history. A more focal area of opacity seen in the left lower lobe.   Follow-up to clearing is recommended. There is severe upper lobe predominant centrilobular and paraseptal emphysema.    Bones: No fracture or aggressive osseous lesion.  Impression: 1. Likely subacute pulmonary embolus within the distal main pulmonary artery on the left extending into the interlobar artery of the left lower lobe.  2. No evidence of thoracic aortic aneurysm or dissection.  3. Findings throughout the lungs likely related to a Covid pneumonia given the patient's clinical history. Follow-up to clearing recommended. Likely area of rounded atelectasis, left lower lobe.  4. Emphysema.    These critical findings were discussed with JT Kimball    Electronically signed  by:  Costa Lou D.O.    12/1/2020 12:34 AM  XR Chest 1 View  Narrative: Exam: Frontal chest    INDICATION: Shortness of breath    COMPARISON: None    FINDINGS:  There are diffuse bilateral interstitial infiltrates.  There are no effusions.    Heart size is normal.  No mediastinal widening.    Sternal wires are intact.  Impression: Diffuse bilateral infiltrates consistent with multifocal pneumonia.    Electronically signed by:  Naveed Welch M.D.    12/1/2020 12:26 AM       ASSESSMENT:    Acute respiratory failure due to COVID-19 (CMS/HCC)    Pneumonia of both lungs due to infectious organism    Subacute pulmonary embolism (CMS/HCC)    Renal disease    CAD (coronary artery disease)    Hypertension    Stroke (CMS/HCC)    Mood disorder (CMS/HCC)    GERD (gastroesophageal reflux disease)    Afib (CMS/HCC)    Insomnia    BPH (benign prostatic hyperplasia)       Recommendations:     Continue  Decadron  Empiric antibiotics doxycycline   Diuretics as needed  Anti-inflammatory agents will include vitamin C vitamin D zinc and Mucomyst p.o.  Continue anticoagulation on Eliquis   Aspirin    Total Critical care time in direct medical management (   ) minutes  Mary Estrada MD. D, ABSM.  Mary Estrada MD   12/9/2020  18:48 EST

## 2020-12-09 NOTE — PROGRESS NOTES
"PULMONARY CRITICAL CARE Progress  NOTE      PATIENT IDENTIFICATION:  Name: Gordon Dowling  MRN: [id number]  :  1955     Age: 65 y.o.  Sex: male    DATE OF Note:  2020   Referring Physician: Svitlana Collins MD                  Subjective:   Per staff on 3 L no SOB no chest pain, no nausea or vomiting, no change in bowel habit, no dysuria,  no new  skin rash or itching.      Objective:  tMax 24 hrs: Temp (24hrs), Av °F (36.1 °C), Min:95.4 °F (35.2 °C), Max:98 °F (36.7 °C)      Vitals Ranges:   Temp:  [95.4 °F (35.2 °C)-98 °F (36.7 °C)] 98 °F (36.7 °C)  Heart Rate:  [] 90  Resp:  [16-33] 18  BP: (120-145)/(71-92) 131/87    Intake and Output Last 3 Shifts:   I/O last 3 completed shifts:  In: 720 [P.O.:720]  Out: 1475 [Urine:1475]    Exam:  /87   Pulse 90   Temp 98 °F (36.7 °C) (Oral)   Resp 18   Ht 180.3 cm (71\")   Wt 83.3 kg (183 lb 10.3 oz)   SpO2 96%   BMI 25.61 kg/m²     General Appearance:   Slow mentation responsive   HEENT:  Normocephalic, without obvious abnormality, Conjunctiva/corneas clear,.  Normal external ear canals, Nares normal, no drainage     Neck:  Supple, symmetrical, trachea midline. No JVD.  Lungs /Chest wall:   Bilateral basal rhonchi, respirations unlabored symmetrical wall movement.     Heart:  Regular rate and rhythm, systolic murmur PMI left sternal border  Abdomen: Soft, non-tender, no masses, no organomegaly.    Extremities: Trace edema no clubbing or Cyanosis        Medications:    Current Facility-Administered Medications:   •  acetaminophen (TYLENOL) tablet 650 mg, 650 mg, Oral, Q4H PRN **OR** acetaminophen (TYLENOL) 160 MG/5ML solution 650 mg, 650 mg, Oral, Q4H PRN **OR** acetaminophen (TYLENOL) suppository 650 mg, 650 mg, Rectal, Q4H PRN, Mary Russo APRN  •  acetaminophen (TYLENOL) tablet 650 mg, 650 mg, Oral, Q6H PRN, Ondina Lawson APRN  •  Acetylcysteine capsule 600 mg, 600 mg, Oral, BID, Kae Mishra MD, 600 mg at 20  •  " albuterol sulfate HFA (PROVENTIL HFA;VENTOLIN HFA;PROAIR HFA) inhaler 2 puff, 2 puff, Inhalation, 4x Daily - RT, Ondina Lawson APRN, 2 puff at 12/08/20 2011  •  ammonium lactate (LAC-HYDRIN) 12 % lotion, , Topical, Q12H, Ondina Lawson, APRN, Given at 12/08/20 2023  •  apixaban (ELIQUIS) tablet 10 mg, 10 mg, Oral, BID, 10 mg at 12/08/20 2021 **FOLLOWED BY** [START ON 12/10/2020] apixaban (ELIQUIS) tablet 5 mg, 5 mg, Oral, BID, Mono Estes MD  •  atorvastatin (LIPITOR) tablet 40 mg, 40 mg, Oral, Daily, Mono Estes MD, 40 mg at 12/08/20 0850  •  benzonatate (TESSALON) capsule 100 mg, 100 mg, Oral, Q8H PRN, Ondina Lawson APRN, 100 mg at 12/04/20 0504  •  bismuth subsalicylate (PEPTO BISMOL) chewable tablet 524 mg, 524 mg, Oral, Q4H PRN, Ondina Lawson APRN  •  budesonide-formoterol (SYMBICORT) 160-4.5 MCG/ACT inhaler 2 puff, 2 puff, Inhalation, BID - RT, Ondina Lawson APRN, 2 puff at 12/08/20 2012  •  bumetanide (BUMEX) tablet 1 mg, 1 mg, Oral, Daily, Ondina Lawson, APRN, 1 mg at 12/08/20 0850  •  busPIRone (BUSPAR) tablet 15 mg, 15 mg, Oral, TID, Ondina Lawson, APRN, 15 mg at 12/08/20 2021  •  cetirizine (zyrTEC) tablet 10 mg, 10 mg, Oral, Nightly, Ondina Lawson, APRN, 10 mg at 12/08/20 2021  •  cloNIDine (CATAPRES) tablet 0.1 mg, 0.1 mg, Oral, Q12H, Mono Estes MD, 0.1 mg at 12/08/20 2021  •  cyclobenzaprine (FLEXERIL) tablet 5 mg, 5 mg, Oral, TID, Ondina Lawson APRN, 5 mg at 12/08/20 2022  •  dexamethasone (DECADRON) injection 6 mg, 6 mg, Intravenous, Daily, Mono Estes MD, 6 mg at 12/08/20 0852  •  dilTIAZem CD (CARDIZEM CD) 24 hr capsule 120 mg, 120 mg, Oral, Daily, Ondina Lawson APRN, 120 mg at 12/08/20 0850  •  docusate sodium (COLACE) capsule 100 mg, 100 mg, Oral, TID, Ondina Lawson APRN, 100 mg at 12/08/20 2022  •  escitalopram (LEXAPRO)  tablet 5 mg, 5 mg, Oral, Daily, Ondina Lawson, APRN, 5 mg at 12/08/20 0851  •  fleet enema 1 enema, 1 enema, Rectal, Daily PRN, Ondina Lawson APRN  •  guaiFENesin (MUCINEX) 12 hr tablet 600 mg, 600 mg, Oral, Q12H, AchterOndina winchester, APRN, 600 mg at 12/08/20 2021  •  HYDROcodone-acetaminophen (NORCO) 5-325 MG per tablet 1 tablet, 1 tablet, Oral, Q6H PRN, Ondina Lawson, APRN, 1 tablet at 12/08/20 2040  •  lactulose (CHRONULAC) 10 GM/15ML solution 20 g, 20 g, Oral, Daily PRN, Ondina Lawson, APRN  •  lidocaine (LIDODERM) 5 % 1 patch, 1 patch, Transdermal, Q24H, Ondina Lawson APRN, 1 patch at 12/08/20 0852  •  Magnesium Sulfate 2 gram infusion - Mg less than or equal to 1.5 mg/dL, 2 g, Intravenous, PRN **OR** Magnesium Sulfate 1 gram infusion - Mg 1.6-1.9 mg/dL, 1 g, Intravenous, PRN, Mary Russo APRN  •  melatonin tablet 10 mg, 10 mg, Oral, Nightly, Mono Estes MD, 10 mg at 12/08/20 2023  •  melatonin tablet 5 mg, 5 mg, Oral, Nightly PRN, Mary Russo APRYULIYA  •  metoclopramide (REGLAN) tablet 10 mg, 10 mg, Oral, TID AC, Ondina Lawson APRN, 10 mg at 12/08/20 1701  •  montelukast (SINGULAIR) tablet 10 mg, 10 mg, Oral, Nightly, Odnina Lawson APRN, 10 mg at 12/08/20 2021  •  nitroglycerin (NITROSTAT) SL tablet 0.4 mg, 0.4 mg, Sublingual, Q5 Min PRN, Mary Russo APRN  •  ondansetron (ZOFRAN) tablet 4 mg, 4 mg, Oral, Q6H PRN **OR** ondansetron (ZOFRAN) injection 4 mg, 4 mg, Intravenous, Q6H PRN, Mary Russo APRN  •  pantoprazole (PROTONIX) EC tablet 40 mg, 40 mg, Oral, Q AM, Ondina Lawson APRN, 40 mg at 12/08/20 0613  •  Pharmacy Consult - Pharmacy to dose, , Does not apply, Continuous PRN, Ondina Lawson APRN  •  polyethylene glycol (MIRALAX) packet 17 g, 17 g, Oral, Daily PRN, Ondina Lawson APRN, 17 g at 12/04/20 0504  •  potassium chloride (K-DUR,KLOR-CON) CR tablet 20 mEq, 20 mEq, Oral, Daily With  Lunch, Jinterannabella, Ondina, APRN, 20 mEq at 12/08/20 1213  •  potassium chloride (K-DUR,KLOR-CON) CR tablet 40 mEq, 40 mEq, Oral, PRN **OR** potassium chloride (KLOR-CON) packet 40 mEq, 40 mEq, Oral, PRN **OR** potassium chloride 10 mEq in 100 mL IVPB, 10 mEq, Intravenous, Q1H PRN, Russo, Mary, APRN  •  simethicone (MYLICON) chewable tablet 120 mg, 120 mg, Oral, TID, Achterberg Ondina, APRN, 120 mg at 12/08/20 2021  •  sodium chloride 0.9 % flush 10 mL, 10 mL, Intravenous, PRN, Mariana Rodriguez MD  •  sodium chloride 0.9 % flush 10 mL, 10 mL, Intravenous, PRN, Jackelyn Collado PA  •  sodium chloride 0.9 % flush 10 mL, 10 mL, Intravenous, Q12H, Russo, Mary, APRN, 10 mL at 12/08/20 2023  •  sodium chloride 0.9 % flush 10 mL, 10 mL, Intravenous, PRN, Russo, Mary, APRN  •  tamsulosin (FLOMAX) 24 hr capsule 0.4 mg, 0.4 mg, Oral, Daily, Ondina Lawson, APRN, 0.4 mg at 12/08/20 0850  •  thiamine (VITAMIN B-1) tablet 200 mg, 200 mg, Oral, Daily, Mono Estes MD, 200 mg at 12/08/20 0849  •  traZODone (DESYREL) tablet 75 mg, 75 mg, Oral, Nightly, JinterNarciso winchesterle, APRN, 75 mg at 12/08/20 2022  •  vitamin C (ASCORBIC ACID) tablet 1,000 mg, 1,000 mg, Oral, Daily, Kae Mishra MD, 1,000 mg at 12/08/20 0850  •  vitamin D3 capsule 5,000 Units, 5,000 Units, Oral, Daily, AchterNarciso winchesterle, APRN, 5,000 Units at 12/08/20 0850  •  witch hazel-glycerin (TUCKS) pad, , Topical, PRN, Ondina Lawson, APRN  •  zinc sulfate (ZINCATE) capsule 220 mg, 220 mg, Oral, BID, Mono Estes MD, 220 mg at 12/08/20 2023    Data Review:  All labs (24hrs):   Recent Results (from the past 24 hour(s))   Comprehensive Metabolic Panel    Collection Time: 12/08/20  2:20 AM    Specimen: Blood   Result Value Ref Range    Glucose 136 (H) 65 - 99 mg/dL    BUN 49 (H) 8 - 23 mg/dL    Creatinine 0.96 0.76 - 1.27 mg/dL    Sodium 135 (L) 136 - 145 mmol/L    Potassium 4.9 3.5 - 5.2 mmol/L     Chloride 102 98 - 107 mmol/L    CO2 22.0 22.0 - 29.0 mmol/L    Calcium 9.3 8.6 - 10.5 mg/dL    Total Protein 7.0 6.0 - 8.5 g/dL    Albumin 3.40 (L) 3.50 - 5.20 g/dL    ALT (SGPT) 55 (H) 1 - 41 U/L    AST (SGOT) 28 1 - 40 U/L    Alkaline Phosphatase 116 39 - 117 U/L    Total Bilirubin 0.6 0.0 - 1.2 mg/dL    eGFR Non African Amer 78 >60 mL/min/1.73    Globulin 3.6 gm/dL    A/G Ratio 0.9 g/dL    BUN/Creatinine Ratio 51.0 (H) 7.0 - 25.0    Anion Gap 11.0 5.0 - 15.0 mmol/L   CK    Collection Time: 12/08/20  2:20 AM    Specimen: Blood   Result Value Ref Range    Creatine Kinase 54 20 - 200 U/L   Ferritin    Collection Time: 12/08/20  2:20 AM    Specimen: Blood   Result Value Ref Range    Ferritin 190.80 30.00 - 400.00 ng/mL   Lactate Dehydrogenase    Collection Time: 12/08/20  2:20 AM    Specimen: Blood   Result Value Ref Range     (H) 135 - 225 U/L   D-dimer, Quantitative    Collection Time: 12/08/20  2:20 AM    Specimen: Blood   Result Value Ref Range    D-Dimer, Quantitative 0.81 (H) 0.00 - 0.59 mg/L (FEU)   Fibrinogen    Collection Time: 12/08/20  2:20 AM    Specimen: Blood   Result Value Ref Range    Fibrinogen 525 (H) 210 - 450 mg/dL   Magnesium    Collection Time: 12/08/20  2:20 AM    Specimen: Blood   Result Value Ref Range    Magnesium 2.1 1.6 - 2.4 mg/dL   CBC Auto Differential    Collection Time: 12/08/20  2:20 AM    Specimen: Blood   Result Value Ref Range    WBC 9.10 3.40 - 10.80 10*3/mm3    RBC 4.86 4.14 - 5.80 10*6/mm3    Hemoglobin 14.9 13.0 - 17.7 g/dL    Hematocrit 45.7 37.5 - 51.0 %    MCV 94.1 79.0 - 97.0 fL    MCH 30.7 26.6 - 33.0 pg    MCHC 32.6 31.5 - 35.7 g/dL    RDW 15.1 12.3 - 15.4 %    RDW-SD 50.3 37.0 - 54.0 fl    MPV 8.8 6.0 - 12.0 fL    Platelets 206 140 - 450 10*3/mm3   Scan Slide    Collection Time: 12/08/20  2:20 AM    Specimen: Blood   Result Value Ref Range    Scan Slide     Manual Differential    Collection Time: 12/08/20  2:20 AM    Specimen: Blood   Result Value Ref Range     Neutrophil % 83.0 (H) 42.7 - 76.0 %    Lymphocyte % 11.0 (L) 19.6 - 45.3 %    Monocyte % 4.0 (L) 5.0 - 12.0 %    Bands %  2.0 0.0 - 5.0 %    Neutrophils Absolute 7.74 (H) 1.70 - 7.00 10*3/mm3    Lymphocytes Absolute 1.00 0.70 - 3.10 10*3/mm3    Monocytes Absolute 0.36 0.10 - 0.90 10*3/mm3    RBC Morphology Normal Normal    WBC Morphology Normal Normal    Platelet Estimate Adequate Normal    Large Platelets Slight/1+ None Seen        Imaging:  CT Chest Pulmonary Embolism  Narrative: Exam: CT Angiography of the Chest.    Date: 12/1/2020.     Comparison: None    History: Shortness of breath with Covid positive test. Elevated d-dimer.    Technique: CT examination of the chest was performed following the intravenous administration of 100 mm of Isovue-370. CT dose lowering techniques were used, to include: automated exposure control, adjustment for patient size, and/or use of iterative   reconstruction.    FINDINGS:    Mediastinum and Nata: There are several slightly enlarged right hilar lymph nodes which could be reactive.    Pleural and Pericardial spaces: There are no pleural or pericardial effusions.    Upper Abdomen: There is a 4 cm cyst is partially visualized in the upper pole the right kidney. The visualized upper abdomen otherwise appears unremarkable.    Cardiovascular: There are midline sternotomy wires. There are severe coronary artery calcifications.    Pulmonary Artery: There is peripheral thrombus seen within the distal main pulmonary artery extending into the interlobar artery of the right lower lobe which is likely subacute.    Lung Parenchyma and Airways: Scattered groundglass areas of opacity are seen throughout the lungs bilaterally which are likely related to a Covid pneumonia given the patient's clinical history. A more focal area of opacity seen in the left lower lobe.   Follow-up to clearing is recommended. There is severe upper lobe predominant centrilobular and paraseptal emphysema.    Bones: No  fracture or aggressive osseous lesion.  Impression: 1. Likely subacute pulmonary embolus within the distal main pulmonary artery on the left extending into the interlobar artery of the left lower lobe.  2. No evidence of thoracic aortic aneurysm or dissection.  3. Findings throughout the lungs likely related to a Covid pneumonia given the patient's clinical history. Follow-up to clearing recommended. Likely area of rounded atelectasis, left lower lobe.  4. Emphysema.    These critical findings were discussed with JT Kimball    Electronically signed by:  Costa Lou D.O.    12/1/2020 12:34 AM  XR Chest 1 View  Narrative: Exam: Frontal chest    INDICATION: Shortness of breath    COMPARISON: None    FINDINGS:  There are diffuse bilateral interstitial infiltrates.  There are no effusions.    Heart size is normal.  No mediastinal widening.    Sternal wires are intact.  Impression: Diffuse bilateral infiltrates consistent with multifocal pneumonia.    Electronically signed by:  Naveed Welch M.D.    12/1/2020 12:26 AM       ASSESSMENT:    Acute respiratory failure due to COVID-19 (CMS/HCC)    Pneumonia of both lungs due to infectious organism    Subacute pulmonary embolism (CMS/HCC)    Renal disease    CAD (coronary artery disease)    Hypertension    Stroke (CMS/HCC)    Mood disorder (CMS/HCC)    GERD (gastroesophageal reflux disease)    Afib (CMS/HCC)    Insomnia    BPH (benign prostatic hyperplasia)       Recommendations:  Pt/ot    Continue  Decadron  Empiric antibiotics doxycycline   Diuretics as needed  Anti-inflammatory agents will include vitamin C vitamin D zinc and Mucomyst p.o.  Continue anticoagulation on Eliquis   Aspirin    Total Critical care time in direct medical management (   ) minutes  Mary Estrada MD. D, ABSM.  Mary Estrada MD   12/8/2020  21:30 EST

## 2020-12-10 VITALS
HEIGHT: 71 IN | DIASTOLIC BLOOD PRESSURE: 86 MMHG | TEMPERATURE: 96.9 F | OXYGEN SATURATION: 95 % | BODY MASS INDEX: 25.86 KG/M2 | SYSTOLIC BLOOD PRESSURE: 133 MMHG | HEART RATE: 84 BPM | WEIGHT: 184.75 LBS | RESPIRATION RATE: 20 BRPM

## 2020-12-10 LAB
ALBUMIN SERPL-MCNC: 3.3 G/DL (ref 3.5–5.2)
ALBUMIN/GLOB SERPL: 1 G/DL
ALP SERPL-CCNC: 113 U/L (ref 39–117)
ALT SERPL W P-5'-P-CCNC: 46 U/L (ref 1–41)
ANION GAP SERPL CALCULATED.3IONS-SCNC: 8 MMOL/L (ref 5–15)
AST SERPL-CCNC: 25 U/L (ref 1–40)
BASOPHILS # BLD AUTO: 0 10*3/MM3 (ref 0–0.2)
BASOPHILS NFR BLD AUTO: 0.4 % (ref 0–1.5)
BILIRUB SERPL-MCNC: 0.5 MG/DL (ref 0–1.2)
BUN SERPL-MCNC: 48 MG/DL (ref 8–23)
BUN/CREAT SERPL: 43.2 (ref 7–25)
CALCIUM SPEC-SCNC: 9 MG/DL (ref 8.6–10.5)
CHLORIDE SERPL-SCNC: 101 MMOL/L (ref 98–107)
CK SERPL-CCNC: 180 U/L (ref 20–200)
CO2 SERPL-SCNC: 24 MMOL/L (ref 22–29)
CREAT SERPL-MCNC: 1.11 MG/DL (ref 0.76–1.27)
CRP SERPL-MCNC: 0.39 MG/DL (ref 0–0.5)
CRP SERPL-MCNC: 7 MG/L (ref 0–10)
D DIMER PPP FEU-MCNC: 0.88 MG/L (FEU) (ref 0–0.59)
DEPRECATED RDW RBC AUTO: 50.8 FL (ref 37–54)
EOSINOPHIL # BLD AUTO: 0 10*3/MM3 (ref 0–0.4)
EOSINOPHIL NFR BLD AUTO: 0 % (ref 0.3–6.2)
ERYTHROCYTE [DISTWIDTH] IN BLOOD BY AUTOMATED COUNT: 15.2 % (ref 12.3–15.4)
FERRITIN SERPL-MCNC: 158.9 NG/ML (ref 30–400)
FIBRINOGEN PPP-MCNC: 392 MG/DL (ref 210–450)
GFR SERPL CREATININE-BSD FRML MDRD: 66 ML/MIN/1.73
GLOBULIN UR ELPH-MCNC: 3.2 GM/DL
GLUCOSE SERPL-MCNC: 115 MG/DL (ref 65–99)
HCT VFR BLD AUTO: 45.4 % (ref 37.5–51)
HGB BLD-MCNC: 14.8 G/DL (ref 13–17.7)
LDH SERPL-CCNC: 308 U/L (ref 135–225)
LYMPHOCYTES # BLD AUTO: 0.6 10*3/MM3 (ref 0.7–3.1)
LYMPHOCYTES NFR BLD AUTO: 5.9 % (ref 19.6–45.3)
MAGNESIUM SERPL-MCNC: 2 MG/DL (ref 1.6–2.4)
MCH RBC QN AUTO: 31 PG (ref 26.6–33)
MCHC RBC AUTO-ENTMCNC: 32.6 G/DL (ref 31.5–35.7)
MCV RBC AUTO: 95 FL (ref 79–97)
MONOCYTES # BLD AUTO: 0.6 10*3/MM3 (ref 0.1–0.9)
MONOCYTES NFR BLD AUTO: 6.8 % (ref 5–12)
NEUTROPHILS NFR BLD AUTO: 8.2 10*3/MM3 (ref 1.7–7)
NEUTROPHILS NFR BLD AUTO: 86.9 % (ref 42.7–76)
NRBC BLD AUTO-RTO: 0.1 /100 WBC (ref 0–0.2)
PLATELET # BLD AUTO: 218 10*3/MM3 (ref 140–450)
PMV BLD AUTO: 9.1 FL (ref 6–12)
POTASSIUM SERPL-SCNC: 4.7 MMOL/L (ref 3.5–5.2)
PROT SERPL-MCNC: 6.5 G/DL (ref 6–8.5)
RBC # BLD AUTO: 4.78 10*6/MM3 (ref 4.14–5.8)
SODIUM SERPL-SCNC: 133 MMOL/L (ref 136–145)
WBC # BLD AUTO: 9.4 10*3/MM3 (ref 3.4–10.8)

## 2020-12-10 PROCEDURE — 85384 FIBRINOGEN ACTIVITY: CPT | Performed by: STUDENT IN AN ORGANIZED HEALTH CARE EDUCATION/TRAINING PROGRAM

## 2020-12-10 PROCEDURE — 85025 COMPLETE CBC W/AUTO DIFF WBC: CPT | Performed by: STUDENT IN AN ORGANIZED HEALTH CARE EDUCATION/TRAINING PROGRAM

## 2020-12-10 PROCEDURE — 83735 ASSAY OF MAGNESIUM: CPT | Performed by: STUDENT IN AN ORGANIZED HEALTH CARE EDUCATION/TRAINING PROGRAM

## 2020-12-10 PROCEDURE — 94760 N-INVAS EAR/PLS OXIMETRY 1: CPT

## 2020-12-10 PROCEDURE — 94799 UNLISTED PULMONARY SVC/PX: CPT

## 2020-12-10 PROCEDURE — 80053 COMPREHEN METABOLIC PANEL: CPT | Performed by: STUDENT IN AN ORGANIZED HEALTH CARE EDUCATION/TRAINING PROGRAM

## 2020-12-10 PROCEDURE — 25010000002 DEXAMETHASONE PER 1 MG: Performed by: INTERNAL MEDICINE

## 2020-12-10 PROCEDURE — 85379 FIBRIN DEGRADATION QUANT: CPT | Performed by: STUDENT IN AN ORGANIZED HEALTH CARE EDUCATION/TRAINING PROGRAM

## 2020-12-10 PROCEDURE — 82728 ASSAY OF FERRITIN: CPT | Performed by: STUDENT IN AN ORGANIZED HEALTH CARE EDUCATION/TRAINING PROGRAM

## 2020-12-10 PROCEDURE — 82550 ASSAY OF CK (CPK): CPT | Performed by: STUDENT IN AN ORGANIZED HEALTH CARE EDUCATION/TRAINING PROGRAM

## 2020-12-10 PROCEDURE — 86140 C-REACTIVE PROTEIN: CPT | Performed by: STUDENT IN AN ORGANIZED HEALTH CARE EDUCATION/TRAINING PROGRAM

## 2020-12-10 PROCEDURE — 99239 HOSP IP/OBS DSCHRG MGMT >30: CPT | Performed by: HOSPITALIST

## 2020-12-10 PROCEDURE — 83615 LACTATE (LD) (LDH) ENZYME: CPT | Performed by: STUDENT IN AN ORGANIZED HEALTH CARE EDUCATION/TRAINING PROGRAM

## 2020-12-10 RX ORDER — ATORVASTATIN CALCIUM 40 MG/1
40 TABLET, FILM COATED ORAL DAILY
Start: 2020-12-11

## 2020-12-10 RX ORDER — THIAMINE MONONITRATE (VIT B1) 100 MG
100 TABLET ORAL DAILY
Start: 2020-12-11

## 2020-12-10 RX ORDER — DEXAMETHASONE 6 MG/1
6 TABLET ORAL
Qty: 4 TABLET | Refills: 0
Start: 2020-12-10

## 2020-12-10 RX ADMIN — OXYCODONE HYDROCHLORIDE AND ACETAMINOPHEN 1000 MG: 500 TABLET ORAL at 08:28

## 2020-12-10 RX ADMIN — CETIRIZINE HYDROCHLORIDE 10 MG: 10 TABLET, FILM COATED ORAL at 19:44

## 2020-12-10 RX ADMIN — APIXABAN 5 MG: 5 TABLET, FILM COATED ORAL at 19:43

## 2020-12-10 RX ADMIN — POTASSIUM CHLORIDE 20 MEQ: 1500 TABLET, EXTENDED RELEASE ORAL at 12:34

## 2020-12-10 RX ADMIN — GUAIFENESIN 600 MG: 600 TABLET, EXTENDED RELEASE ORAL at 19:44

## 2020-12-10 RX ADMIN — METOCLOPRAMIDE 10 MG: 10 TABLET ORAL at 12:34

## 2020-12-10 RX ADMIN — ALBUTEROL SULFATE 2 PUFF: 108 AEROSOL, METERED RESPIRATORY (INHALATION) at 19:00

## 2020-12-10 RX ADMIN — Medication 600 MG: at 08:27

## 2020-12-10 RX ADMIN — DOCUSATE SODIUM 100 MG: 100 CAPSULE, LIQUID FILLED ORAL at 15:35

## 2020-12-10 RX ADMIN — ALBUTEROL SULFATE 2 PUFF: 108 AEROSOL, METERED RESPIRATORY (INHALATION) at 15:01

## 2020-12-10 RX ADMIN — DEXAMETHASONE SODIUM PHOSPHATE 6 MG: 4 INJECTION, SOLUTION INTRAMUSCULAR; INTRAVENOUS at 08:25

## 2020-12-10 RX ADMIN — CYCLOBENZAPRINE HYDROCHLORIDE 5 MG: 10 TABLET, FILM COATED ORAL at 15:34

## 2020-12-10 RX ADMIN — CLONIDINE HYDROCHLORIDE 0.1 MG: 0.1 TABLET ORAL at 08:27

## 2020-12-10 RX ADMIN — ZINC SULFATE 220 MG (50 MG) CAPSULE 220 MG: CAPSULE at 08:28

## 2020-12-10 RX ADMIN — BUDESONIDE AND FORMOTEROL FUMARATE DIHYDRATE 2 PUFF: 160; 4.5 AEROSOL RESPIRATORY (INHALATION) at 07:44

## 2020-12-10 RX ADMIN — ATORVASTATIN CALCIUM 40 MG: 40 TABLET, FILM COATED ORAL at 08:26

## 2020-12-10 RX ADMIN — ESCITALOPRAM OXALATE 5 MG: 10 TABLET ORAL at 12:37

## 2020-12-10 RX ADMIN — LIDOCAINE 1 PATCH: 50 PATCH TOPICAL at 08:23

## 2020-12-10 RX ADMIN — SIMETHICONE 120 MG: 80 TABLET, CHEWABLE ORAL at 19:43

## 2020-12-10 RX ADMIN — PANTOPRAZOLE SODIUM 40 MG: 40 TABLET, DELAYED RELEASE ORAL at 05:04

## 2020-12-10 RX ADMIN — HYDROCODONE BITARTRATE AND ACETAMINOPHEN 1 TABLET: 5; 325 TABLET ORAL at 05:04

## 2020-12-10 RX ADMIN — TAMSULOSIN HYDROCHLORIDE 0.4 MG: 0.4 CAPSULE ORAL at 08:36

## 2020-12-10 RX ADMIN — MONTELUKAST 10 MG: 10 TABLET, FILM COATED ORAL at 19:43

## 2020-12-10 RX ADMIN — METOCLOPRAMIDE 10 MG: 10 TABLET ORAL at 16:57

## 2020-12-10 RX ADMIN — POLYETHYLENE GLYCOL 3350 17 G: 17 POWDER, FOR SOLUTION ORAL at 19:45

## 2020-12-10 RX ADMIN — DILTIAZEM HYDROCHLORIDE 120 MG: 120 CAPSULE, COATED, EXTENDED RELEASE ORAL at 08:26

## 2020-12-10 RX ADMIN — ZINC SULFATE 220 MG (50 MG) CAPSULE 220 MG: CAPSULE at 19:44

## 2020-12-10 RX ADMIN — Medication 10 ML: at 08:28

## 2020-12-10 RX ADMIN — HYDROCODONE BITARTRATE AND ACETAMINOPHEN 1 TABLET: 5; 325 TABLET ORAL at 15:35

## 2020-12-10 RX ADMIN — APIXABAN 5 MG: 5 TABLET, FILM COATED ORAL at 08:28

## 2020-12-10 RX ADMIN — BUSPIRONE HYDROCHLORIDE 15 MG: 10 TABLET ORAL at 15:36

## 2020-12-10 RX ADMIN — GUAIFENESIN 600 MG: 600 TABLET, EXTENDED RELEASE ORAL at 08:28

## 2020-12-10 RX ADMIN — Medication: at 08:34

## 2020-12-10 RX ADMIN — DOCUSATE SODIUM 100 MG: 100 CAPSULE, LIQUID FILLED ORAL at 08:26

## 2020-12-10 RX ADMIN — Medication 600 MG: at 19:43

## 2020-12-10 RX ADMIN — SIMETHICONE 120 MG: 80 TABLET, CHEWABLE ORAL at 15:35

## 2020-12-10 RX ADMIN — BUSPIRONE HYDROCHLORIDE 15 MG: 10 TABLET ORAL at 08:28

## 2020-12-10 RX ADMIN — CLONIDINE HYDROCHLORIDE 0.1 MG: 0.1 TABLET ORAL at 19:43

## 2020-12-10 RX ADMIN — CYCLOBENZAPRINE HYDROCHLORIDE 5 MG: 10 TABLET, FILM COATED ORAL at 08:27

## 2020-12-10 RX ADMIN — SIMETHICONE 120 MG: 80 TABLET, CHEWABLE ORAL at 08:27

## 2020-12-10 RX ADMIN — BUDESONIDE AND FORMOTEROL FUMARATE DIHYDRATE 2 PUFF: 160; 4.5 AEROSOL RESPIRATORY (INHALATION) at 19:00

## 2020-12-10 RX ADMIN — BUSPIRONE HYDROCHLORIDE 15 MG: 10 TABLET ORAL at 19:44

## 2020-12-10 RX ADMIN — ALBUTEROL SULFATE 2 PUFF: 108 AEROSOL, METERED RESPIRATORY (INHALATION) at 07:43

## 2020-12-10 RX ADMIN — DOCUSATE SODIUM 100 MG: 100 CAPSULE, LIQUID FILLED ORAL at 19:44

## 2020-12-10 RX ADMIN — Medication 200 MG: at 08:26

## 2020-12-10 RX ADMIN — BUMETANIDE 1 MG: 1 TABLET ORAL at 08:27

## 2020-12-10 RX ADMIN — Medication 1 APPLICATION: at 19:43

## 2020-12-10 RX ADMIN — CYCLOBENZAPRINE HYDROCHLORIDE 5 MG: 10 TABLET, FILM COATED ORAL at 19:42

## 2020-12-10 RX ADMIN — METOCLOPRAMIDE 10 MG: 10 TABLET ORAL at 08:26

## 2020-12-10 NOTE — PLAN OF CARE
Goal Outcome Evaluation:  Plan of Care Reviewed With: patient  Progress: improving   Patient has rested well tonight; complaints of back pain have been controlled with hydrocodone; no respiratory distress noted; urinating without difficulty; drinking po fluids without difficulty; will continue to monitor patient.

## 2020-12-10 NOTE — PROGRESS NOTES
Continued Stay Note  HENRIETTA Mendes     Patient Name: Gordon Dowling  MRN: [id number]  Today's Date: 12/10/2020    Admit Date: 12/1/2020    Discharge Plan     Row Name 12/10/20 1431       Plan    Plan Comments  SW confirmed bed is ready at [facility] for today, 12/10 per liaison (Brandy). MD/RN notified.     Phone communication only - no physical contact with patient or family.    DARIANA Sahu    Phone: [phone]  Cell: [phone]  Fax: [phone]  [email or web address]

## 2020-12-10 NOTE — PLAN OF CARE
Goal Outcome Evaluation:  Plan of Care Reviewed With: patient  Progress: improving  Outcome Summary: being discharge to [facility]

## 2020-12-11 NOTE — PROGRESS NOTES
"PULMONARY CRITICAL CARE Progress  NOTE      PATIENT IDENTIFICATION:  Name: Gordon Dowling  MRN: [id number]  :  1955     Age: 65 y.o.  Sex: male    DATE OF Note:  12/10/2020   Referring Physician: Svitlana Collins MD                  Subjective:   Improved appetite   More awake   no nausea or vomiting, no change in bowel habit, no dysuria,  no new  skin rash or itching.      Objective:  tMax 24 hrs: Temp (24hrs), Av.5 °F (35.8 °C), Min:96 °F (35.6 °C), Max:96.9 °F (36.1 °C)      Vitals Ranges:   Temp:  [96 °F (35.6 °C)-96.9 °F (36.1 °C)] 96.9 °F (36.1 °C)  Heart Rate:  [74-84] 84  Resp:  [18-25] 20  BP: (133-148)/(80-94) 133/86    Intake and Output Last 3 Shifts:   I/O last 3 completed shifts:  In: 1360 [P.O.:1360]  Out: 1400 [Urine:1400]    Exam:  /86 (BP Location: Right arm, Patient Position: Sitting)   Pulse 84   Temp 96.9 °F (36.1 °C) (Oral)   Resp 20   Ht 180.3 cm (71\")   Wt 83.8 kg (184 lb 11.9 oz)   SpO2 95%   BMI 25.77 kg/m²     General Appearance:   Alert awake follow simple commands   HEENT:  Normocephalic, without obvious abnormality, Conjunctiva/corneas clear,.  Normal external ear canals, Nares normal, no drainage     Neck:  Supple, symmetrical, trachea midline. No JVD.  Lungs /Chest wall:   Bilateral basal rhonchi, respirations unlabored symmetrical wall movement.     Heart:  Regular rate and rhythm, systolic murmur PMI left sternal border  Abdomen: Soft, non-tender, no masses, no organomegaly.    Extremities: Trace edema no clubbing or Cyanosis        Medications:    Current Facility-Administered Medications:   •  acetaminophen (TYLENOL) tablet 650 mg, 650 mg, Oral, Q4H PRN **OR** acetaminophen (TYLENOL) 160 MG/5ML solution 650 mg, 650 mg, Oral, Q4H PRN **OR** acetaminophen (TYLENOL) suppository 650 mg, 650 mg, Rectal, Q4H PRN, Mary Russo APRN  •  acetaminophen (TYLENOL) tablet 650 mg, 650 mg, Oral, Q6H PRN, Ondina Lawson, ALBERTO  •  Acetylcysteine capsule 600 mg, " 600 mg, Oral, BID, DrawKae MD, 600 mg at 12/10/20 1943  •  albuterol sulfate HFA (PROVENTIL HFA;VENTOLIN HFA;PROAIR HFA) inhaler 2 puff, 2 puff, Inhalation, 4x Daily - RT, Ondina Lawson APRN, 2 puff at 12/10/20 1900  •  ammonium lactate (LAC-HYDRIN) 12 % lotion, , Topical, Q12H, Ondina Lawson APRN, 1 application at 12/10/20 1943  •  [COMPLETED] apixaban (ELIQUIS) tablet 10 mg, 10 mg, Oral, BID, 10 mg at 12/09/20 2115 **FOLLOWED BY** apixaban (ELIQUIS) tablet 5 mg, 5 mg, Oral, BID, Mono Estes MD, 5 mg at 12/10/20 1943  •  atorvastatin (LIPITOR) tablet 40 mg, 40 mg, Oral, Daily, Mono Estes MD, 40 mg at 12/10/20 0826  •  benzonatate (TESSALON) capsule 100 mg, 100 mg, Oral, Q8H PRN, Ondina Lwason APRN, 100 mg at 12/04/20 0504  •  bismuth subsalicylate (PEPTO BISMOL) chewable tablet 524 mg, 524 mg, Oral, Q4H PRN, Ondina Lawson APRN  •  budesonide-formoterol (SYMBICORT) 160-4.5 MCG/ACT inhaler 2 puff, 2 puff, Inhalation, BID - RT, Ondina Lawson APRN, 2 puff at 12/10/20 1900  •  bumetanide (BUMEX) tablet 1 mg, 1 mg, Oral, Daily, Ondina Lawson APRN, 1 mg at 12/10/20 0827  •  busPIRone (BUSPAR) tablet 15 mg, 15 mg, Oral, TID, Ondina Lawson APRN, 15 mg at 12/10/20 1944  •  cetirizine (zyrTEC) tablet 10 mg, 10 mg, Oral, Nightly, Ondina Lawson APRN, 10 mg at 12/10/20 1944  •  cloNIDine (CATAPRES) tablet 0.1 mg, 0.1 mg, Oral, Q12H, Mono Estes MD, 0.1 mg at 12/10/20 1943  •  cyclobenzaprine (FLEXERIL) tablet 5 mg, 5 mg, Oral, TID, Ondina Lawson APRN, 5 mg at 12/10/20 1942  •  dexamethasone (DECADRON) injection 6 mg, 6 mg, Intravenous, Daily, Mono Estes MD, 6 mg at 12/10/20 0825  •  dilTIAZem CD (CARDIZEM CD) 24 hr capsule 120 mg, 120 mg, Oral, Daily, Ondina Lawson APRN, 120 mg at 12/10/20 0826  •  docusate sodium (COLACE) capsule 100 mg, 100 mg, Oral, TID,  Ondina Lawson, APRN, 100 mg at 12/10/20 1944  •  escitalopram (LEXAPRO) tablet 5 mg, 5 mg, Oral, Daily, Ondina Lawson, APRN, 5 mg at 12/10/20 1237  •  fleet enema 1 enema, 1 enema, Rectal, Daily PRN, Ondina Lawson, APRN  •  guaiFENesin (MUCINEX) 12 hr tablet 600 mg, 600 mg, Oral, Q12H, Ondina Lawson, APRN, 600 mg at 12/10/20 1944  •  HYDROcodone-acetaminophen (NORCO) 5-325 MG per tablet 1 tablet, 1 tablet, Oral, Q6H PRN, Ondian Lawson APRN, 1 tablet at 12/10/20 1535  •  lactulose (CHRONULAC) 10 GM/15ML solution 20 g, 20 g, Oral, Daily PRN, Ondina Lawson, APRN  •  lidocaine (LIDODERM) 5 % 1 patch, 1 patch, Transdermal, Q24H, Ondina Lawson APRN, 1 patch at 12/10/20 0823  •  Magnesium Sulfate 2 gram infusion - Mg less than or equal to 1.5 mg/dL, 2 g, Intravenous, PRN **OR** Magnesium Sulfate 1 gram infusion - Mg 1.6-1.9 mg/dL, 1 g, Intravenous, PRN, Mary Russo, APRN  •  melatonin tablet 10 mg, 10 mg, Oral, Nightly, FranklynMono MD, 5 mg at 12/09/20 2115  •  melatonin tablet 5 mg, 5 mg, Oral, Nightly PRN, Mary Russo, APRN  •  metoclopramide (REGLAN) tablet 10 mg, 10 mg, Oral, TID AC, Ondina Lawson, APRN, 10 mg at 12/10/20 1657  •  montelukast (SINGULAIR) tablet 10 mg, 10 mg, Oral, Nightly, Ondina Lawson, APRN, 10 mg at 12/10/20 1943  •  nitroglycerin (NITROSTAT) SL tablet 0.4 mg, 0.4 mg, Sublingual, Q5 Min PRN, Mary Russo APRN  •  ondansetron (ZOFRAN) tablet 4 mg, 4 mg, Oral, Q6H PRN **OR** ondansetron (ZOFRAN) injection 4 mg, 4 mg, Intravenous, Q6H PRN, Mary Russo APRN  •  pantoprazole (PROTONIX) EC tablet 40 mg, 40 mg, Oral, Q AM, Ondina Lawson APRN, 40 mg at 12/10/20 0504  •  Pharmacy Consult - Pharmacy to dose, , Does not apply, Continuous PRN, Ondina Lawson APRN  •  polyethylene glycol (MIRALAX) packet 17 g, 17 g, Oral, Daily PRN, Ondina Lawson APRN, 17 g at 12/10/20 1945  •   potassium chloride (K-DUR,KLOR-CON) CR tablet 20 mEq, 20 mEq, Oral, Daily With Lunch, Ondina Lawson APRN, 20 mEq at 12/10/20 1234  •  potassium chloride (K-DUR,KLOR-CON) CR tablet 40 mEq, 40 mEq, Oral, PRN **OR** potassium chloride (KLOR-CON) packet 40 mEq, 40 mEq, Oral, PRN **OR** potassium chloride 10 mEq in 100 mL IVPB, 10 mEq, Intravenous, Q1H PRN, Paul Russoa, APRN  •  simethicone (MYLICON) chewable tablet 120 mg, 120 mg, Oral, TID, Ondina Lawson, APRN, 120 mg at 12/10/20 1943  •  sodium chloride 0.9 % flush 10 mL, 10 mL, Intravenous, PRN, Mariana Rodriguez MD  •  sodium chloride 0.9 % flush 10 mL, 10 mL, Intravenous, PRN, Jackelyn Collado PA  •  sodium chloride 0.9 % flush 10 mL, 10 mL, Intravenous, Q12H, Paul Russoa, APRN, 10 mL at 12/10/20 0828  •  sodium chloride 0.9 % flush 10 mL, 10 mL, Intravenous, PRN, Karan Mary, APRN  •  tamsulosin (FLOMAX) 24 hr capsule 0.4 mg, 0.4 mg, Oral, Daily, Ondina Lawson APRN, 0.4 mg at 12/10/20 0836  •  thiamine (VITAMIN B-1) tablet 200 mg, 200 mg, Oral, Daily, Mono Estes MD, 200 mg at 12/10/20 0826  •  traZODone (DESYREL) tablet 75 mg, 75 mg, Oral, Nightly, Ondina Lawson APRN, 75 mg at 12/09/20 2115  •  vitamin C (ASCORBIC ACID) tablet 1,000 mg, 1,000 mg, Oral, Daily, DrawKae MD, 1,000 mg at 12/10/20 0828  •  witch hazel-glycerin (TUCKS) pad, , Topical, PRN, Ondina Lawson, APRN  •  zinc sulfate (ZINCATE) capsule 220 mg, 220 mg, Oral, BID, Mono Estes MD, 220 mg at 12/10/20 1944    Data Review:  All labs (24hrs):   Recent Results (from the past 24 hour(s))   Comprehensive Metabolic Panel    Collection Time: 12/10/20  4:50 AM    Specimen: Blood   Result Value Ref Range    Glucose 115 (H) 65 - 99 mg/dL    BUN 48 (H) 8 - 23 mg/dL    Creatinine 1.11 0.76 - 1.27 mg/dL    Sodium 133 (L) 136 - 145 mmol/L    Potassium 4.7 3.5 - 5.2 mmol/L    Chloride 101 98 - 107 mmol/L    CO2 24.0  22.0 - 29.0 mmol/L    Calcium 9.0 8.6 - 10.5 mg/dL    Total Protein 6.5 6.0 - 8.5 g/dL    Albumin 3.30 (L) 3.50 - 5.20 g/dL    ALT (SGPT) 46 (H) 1 - 41 U/L    AST (SGOT) 25 1 - 40 U/L    Alkaline Phosphatase 113 39 - 117 U/L    Total Bilirubin 0.5 0.0 - 1.2 mg/dL    eGFR Non African Amer 66 >60 mL/min/1.73    Globulin 3.2 gm/dL    A/G Ratio 1.0 g/dL    BUN/Creatinine Ratio 43.2 (H) 7.0 - 25.0    Anion Gap 8.0 5.0 - 15.0 mmol/L   CK    Collection Time: 12/10/20  4:50 AM    Specimen: Blood   Result Value Ref Range    Creatine Kinase 180 20 - 200 U/L   C-reactive Protein    Collection Time: 12/10/20  4:50 AM    Specimen: Blood   Result Value Ref Range    C-Reactive Protein 0.39 0.00 - 0.50 mg/dL   Ferritin    Collection Time: 12/10/20  4:50 AM    Specimen: Blood   Result Value Ref Range    Ferritin 158.90 30.00 - 400.00 ng/mL   Lactate Dehydrogenase    Collection Time: 12/10/20  4:50 AM    Specimen: Blood   Result Value Ref Range     (H) 135 - 225 U/L   D-dimer, Quantitative    Collection Time: 12/10/20  4:50 AM    Specimen: Blood   Result Value Ref Range    D-Dimer, Quantitative 0.88 (H) 0.00 - 0.59 mg/L (FEU)   Fibrinogen    Collection Time: 12/10/20  4:50 AM    Specimen: Blood   Result Value Ref Range    Fibrinogen 392 210 - 450 mg/dL   Magnesium    Collection Time: 12/10/20  4:50 AM    Specimen: Blood   Result Value Ref Range    Magnesium 2.0 1.6 - 2.4 mg/dL   CBC Auto Differential    Collection Time: 12/10/20  4:50 AM    Specimen: Blood   Result Value Ref Range    WBC 9.40 3.40 - 10.80 10*3/mm3    RBC 4.78 4.14 - 5.80 10*6/mm3    Hemoglobin 14.8 13.0 - 17.7 g/dL    Hematocrit 45.4 37.5 - 51.0 %    MCV 95.0 79.0 - 97.0 fL    MCH 31.0 26.6 - 33.0 pg    MCHC 32.6 31.5 - 35.7 g/dL    RDW 15.2 12.3 - 15.4 %    RDW-SD 50.8 37.0 - 54.0 fl    MPV 9.1 6.0 - 12.0 fL    Platelets 218 140 - 450 10*3/mm3    Neutrophil % 86.9 (H) 42.7 - 76.0 %    Lymphocyte % 5.9 (L) 19.6 - 45.3 %    Monocyte % 6.8 5.0 - 12.0 %     Eosinophil % 0.0 (L) 0.3 - 6.2 %    Basophil % 0.4 0.0 - 1.5 %    Neutrophils, Absolute 8.20 (H) 1.70 - 7.00 10*3/mm3    Lymphocytes, Absolute 0.60 (L) 0.70 - 3.10 10*3/mm3    Monocytes, Absolute 0.60 0.10 - 0.90 10*3/mm3    Eosinophils, Absolute 0.00 0.00 - 0.40 10*3/mm3    Basophils, Absolute 0.00 0.00 - 0.20 10*3/mm3    nRBC 0.1 0.0 - 0.2 /100 WBC        Imaging:  CT Chest Pulmonary Embolism  Narrative: Exam: CT Angiography of the Chest.    Date: 12/1/2020.     Comparison: None    History: Shortness of breath with Covid positive test. Elevated d-dimer.    Technique: CT examination of the chest was performed following the intravenous administration of 100 mm of Isovue-370. CT dose lowering techniques were used, to include: automated exposure control, adjustment for patient size, and/or use of iterative   reconstruction.    FINDINGS:    Mediastinum and Nata: There are several slightly enlarged right hilar lymph nodes which could be reactive.    Pleural and Pericardial spaces: There are no pleural or pericardial effusions.    Upper Abdomen: There is a 4 cm cyst is partially visualized in the upper pole the right kidney. The visualized upper abdomen otherwise appears unremarkable.    Cardiovascular: There are midline sternotomy wires. There are severe coronary artery calcifications.    Pulmonary Artery: There is peripheral thrombus seen within the distal main pulmonary artery extending into the interlobar artery of the right lower lobe which is likely subacute.    Lung Parenchyma and Airways: Scattered groundglass areas of opacity are seen throughout the lungs bilaterally which are likely related to a Covid pneumonia given the patient's clinical history. A more focal area of opacity seen in the left lower lobe.   Follow-up to clearing is recommended. There is severe upper lobe predominant centrilobular and paraseptal emphysema.    Bones: No fracture or aggressive osseous lesion.  Impression: 1. Likely subacute  pulmonary embolus within the distal main pulmonary artery on the left extending into the interlobar artery of the left lower lobe.  2. No evidence of thoracic aortic aneurysm or dissection.  3. Findings throughout the lungs likely related to a Covid pneumonia given the patient's clinical history. Follow-up to clearing recommended. Likely area of rounded atelectasis, left lower lobe.  4. Emphysema.    These critical findings were discussed with JT Kimball    Electronically signed by:  Costa Lou D.O.    12/1/2020 12:34 AM  XR Chest 1 View  Narrative: Exam: Frontal chest    INDICATION: Shortness of breath    COMPARISON: None    FINDINGS:  There are diffuse bilateral interstitial infiltrates.  There are no effusions.    Heart size is normal.  No mediastinal widening.    Sternal wires are intact.  Impression: Diffuse bilateral infiltrates consistent with multifocal pneumonia.    Electronically signed by:  Naveed Welch M.D.    12/1/2020 12:26 AM       ASSESSMENT:    Acute respiratory failure due to COVID-19 (CMS/HCC)    Pneumonia of both lungs due to infectious organism    Subacute pulmonary embolism (CMS/HCC)    Renal disease    CAD (coronary artery disease)    Hypertension    Stroke (CMS/HCC)    Mood disorder (CMS/HCC)    GERD (gastroesophageal reflux disease)    Afib (CMS/HCC)    Insomnia    BPH (benign prostatic hyperplasia)       Recommendations:  Continue o2 2 L  Continue  Decadron  Empiric antibiotics doxycycline   Diuretics as needed  Anti-inflammatory agents will include vitamin C vitamin D zinc and Mucomyst p.o.  Continue anticoagulation on Eliquis   Aspirin    Total Critical care time in direct medical management (   ) minutes  Mary Estrada MD. D, ABSM.  Mary Estrada MD   12/10/2020  21:01 EST

## 2020-12-11 NOTE — PROGRESS NOTES
Case Management Discharge Note      Final Note: [facility]    Provided Post Acute Provider List?: N/A    Selected Continued Care - Discharged on 12/10/2020 Admission date: 12/1/2020 - Discharge disposition: Skilled Nursing Facility (DC - External)                 Final Discharge Disposition Code: 04 - intermediate care facility

## 2020-12-12 LAB — QT INTERVAL: 391 MS

## 2020-12-14 NOTE — PROGRESS NOTES
[facility] Medicine Services Daily Progress Note      Hospitalist Team  LOS 8 days      Patient Care Team:  Ba Loaiza MD as PCP - General (Geriatric Medicine)  Provider, No Known as PCP - Family Medicine  Provider, No Known    Patient Location: 2125/1      Subjective   Subjective     Chief Complaint / Subjective  Chief Complaint   Patient presents with   • Shortness of Breath         Brief Synopsis of Hospital Course/HPI  HPI limited due to patient on Bipap mask, mostly taken from medical record review and ER documentation     Mr. Dowling is a 65 y.o. male with PMH of COPD on oxygen nocturnally, HTN, CAD, A.fib on Xarelto, CKD, CVA, GERD, anemia, and insomnia who presented to [facility] ER 12/1/20 from [facility] with report per EMS of shortness of breath and hypoxia. He was reported to have O2 saturation 69% on room air. He was placed on 2L O2 via NC and had O2 saturation 74%. He was placed on non-rebreather by EMS. He reported some left sided chest pain, cough. Per ER he had no fever, nausea, vomiting, or diarrhea but reported constipation. The patient tested positive at his facility on 11/23/2020 per ER documentation. He normally wears O2 at night.      In the ER patient had CT PE protocol that showed likely subacute pulmonary embolus within the distal main pulmonary artery on the left extending into the interlobar artery of the left lower lobe. Findings throughout the lungs likely related to Covid pneumonia. Emphysema. ABG showed ph 7.39, CO2 43.2, O2 64, HCO3 26.5 on 100% non-rebreather. He was placed on Bipap. WBC normal, temp 99.2. Respiratory viral panel WITHOUT Covid was negative. Troponin negative x2. Blood cultures were drawn. He was given IV rocephin, zithromax, decadron, 324mg aspirin, started treatment lovenox. He was admitted to the PCU for further treatment.      Date::      12/2/2020  Patient had been high flow oxygen.  Short of breath    12/3/2020  Still on high  "flow nasal cannula with precision flow device  Denies any nausea or vomiting or abdominal pain or chest pain  Switching to Eliquis since difficult to control his PTT with heparin drip  Discussed with nursing staff    12/4  Patient has persistent cough  Medications adjusted to help her symptoms  Events overnight reviewed  Decrease steroids  We will discuss with pulmonologist    12/5/2020  Patient lying down in bed without distress but has high flow precision cannula in place with 80%FiO2  Asked him to drink water  He denies abdominal pain but has chest pain  Previous troponin were done and were negative on he came in.  Likely related to his pneumonia and cough.  CRP decreasing    12/6  Patient seenStill complaining of shortness of breath.  Had intermittent dry cough.  Oxygen saturatPrecision flow cannulaion between 92 and 96Percent on 60%    12/7  O2 being titrated down, in no acute distress, remains on HFNC     12/8  HFNC continuing to be weaned down, no acute distress     12/9  HFNC continuing to be weaned down, no acute distress     ROS  All other systems reviewed and negative    Objective   Objective      Vital Signs     Oxygen Therapy  SpO2: 95 %  Pulse Oximetry Type: Continuous  Device (Oxygen Therapy): humidified, nasal cannula  $ High Flow Nasal Cannula Set-Up: yes  Flow (L/min): 4  Oxygen Concentration (%): 40  Flowsheet Rows      First Filed Value   Admission Height  180.3 cm (71\") Documented at 11/30/2020 2358   Admission Weight  104 kg (230 lb) Documented at 11/30/2020 2358        Intake & Output (last 3 days)       12/11 0701 - 12/12 0700 12/12 0701 - 12/13 0700 12/13 0701 - 12/14 0700    P.O.       Total Intake(mL/kg)       Urine (mL/kg/hr)       Stool       Total Output       Net                  Lines, Drains & Airways    Active LDAs     Name:   Placement date:   Placement time:   Site:   Days:    Peripheral IV 12/01/20 0039 Right Antecubital   12/01/20 0039    Antecubital   1                  "         Physical Exam:    Physical Exam  Vitals signs and nursing note reviewed.   Constitutional:       General: He is not in acute distress.     Appearance: Normal appearance. He is well-developed. He is not ill-appearing, toxic-appearing or diaphoretic.   HENT:      Head: Normocephalic and atraumatic.      Right Ear: Ear canal and external ear normal.      Left Ear: Ear canal and external ear normal.      Nose: Nose normal. No congestion or rhinorrhea.      Mouth/Throat:      Mouth: Mucous membranes are moist.      Pharynx: No oropharyngeal exudate.   Eyes:      General: No scleral icterus.        Right eye: No discharge.         Left eye: No discharge.      Extraocular Movements: Extraocular movements intact.      Conjunctiva/sclera: Conjunctivae normal.      Pupils: Pupils are equal, round, and reactive to light.   Neck:      Musculoskeletal: Normal range of motion and neck supple. No neck rigidity or muscular tenderness.      Thyroid: No thyromegaly.      Vascular: No carotid bruit or JVD.      Trachea: No tracheal deviation.   Cardiovascular:      Rate and Rhythm: Normal rate and regular rhythm.      Pulses: Normal pulses.      Heart sounds: Normal heart sounds. No murmur. No friction rub. No gallop.    Pulmonary:      Effort: Accessory muscle usage and respiratory distress present.      Breath sounds: No stridor. Decreased breath sounds and rales present. No wheezing or rhonchi.   Chest:      Chest wall: No tenderness.   Abdominal:      General: Bowel sounds are normal. There is no distension.      Palpations: Abdomen is soft. There is no mass.      Tenderness: There is no abdominal tenderness. There is no guarding or rebound.      Hernia: No hernia is present.   Musculoskeletal: Normal range of motion.         General: No swelling, tenderness, deformity or signs of injury.      Right lower leg: No edema.      Left lower leg: No edema.   Lymphadenopathy:      Cervical: No cervical adenopathy.   Skin:      General: Skin is warm and dry.      Coloration: Skin is not jaundiced or pale.      Findings: No bruising, erythema or rash.   Neurological:      General: No focal deficit present.      Mental Status: He is alert and oriented to person, place, and time. Mental status is at baseline.      Cranial Nerves: No cranial nerve deficit.      Sensory: No sensory deficit.      Motor: No weakness or abnormal muscle tone.      Coordination: Coordination normal.   Psychiatric:         Mood and Affect: Mood normal.         Behavior: Behavior normal.         Thought Content: Thought content normal.         Judgment: Judgment normal.               Procedures:              Results Review:     I reviewed the patient's new clinical results.      Lab Results (last 24 hours)     Procedure Component Value Units Date/Time    Lactate Dehydrogenase [[id number]]  (Abnormal) Collected: 12/10/20 0450    Specimen: Blood Updated: 12/10/20 0629      U/L      Comment: Specimen hemolyzed.  Results may be affected.       Ferritin [[id number]]  (Normal) Collected: 12/10/20 0450    Specimen: Blood Updated: 12/10/20 0628     Ferritin 158.90 ng/mL     Narrative:      Results may be falsely decreased if patient taking Biotin.      Comprehensive Metabolic Panel [[id number]]  (Abnormal) Collected: 12/10/20 0450    Specimen: Blood Updated: 12/10/20 0626     Glucose 115 mg/dL      BUN 48 mg/dL      Creatinine 1.11 mg/dL      Sodium 133 mmol/L      Potassium 4.7 mmol/L      Comment: Slight hemolysis detected by analyzer. Results may be affected.        Chloride 101 mmol/L      CO2 24.0 mmol/L      Calcium 9.0 mg/dL      Total Protein 6.5 g/dL      Albumin 3.30 g/dL      ALT (SGPT) 46 U/L      AST (SGOT) 25 U/L      Alkaline Phosphatase 113 U/L      Total Bilirubin 0.5 mg/dL      eGFR Non African Amer 66 mL/min/1.73      Globulin 3.2 gm/dL      A/G Ratio 1.0 g/dL      BUN/Creatinine Ratio 43.2     Anion Gap 8.0 mmol/L     Narrative:      GFR Normal  >60  Chronic Kidney Disease <60  Kidney Failure <15      CK [[id number]]  (Normal) Collected: 12/10/20 0450    Specimen: Blood Updated: 12/10/20 0626     Creatine Kinase 180 U/L     C-reactive Protein [[id number]]  (Normal) Collected: 12/10/20 0450    Specimen: Blood Updated: 12/10/20 0626     C-Reactive Protein 0.39 mg/dL     Magnesium [[id number]]  (Normal) Collected: 12/10/20 0450    Specimen: Blood Updated: 12/10/20 0626     Magnesium 2.0 mg/dL     C-reactive Protein [[id number]] Collected: 12/09/20 0435    Specimen: Blood Updated: 12/10/20 0611     C-Reactive Protein 7 mg/L     Narrative:      Performed at:  [address] [place] [address]  [id number]  : Dino Quintana PhD, Phone:  [phone]    Fibrinogen [[id number]]  (Normal) Collected: 12/10/20 0450    Specimen: Blood Updated: 12/10/20 0551     Fibrinogen 392 mg/dL     D-dimer, Quantitative [[id number]]  (Abnormal) Collected: 12/10/20 0450    Specimen: Blood Updated: 12/10/20 0551     D-Dimer, Quantitative 0.88 mg/L (FEU)     Narrative:      Reference Range  --------------------------------------------------------------------     < 0.50   Negative Predictive Value  0.50-0.59   Indeterminate    >= 0.60   Probable VTE             A very low percentage of patients with DVT may yield D-Dimer results   below the cut-off of 0.50 mg/L FEU.  This is known to be more   prevalent in patients with distal DVT.             Results of this test should always be interpreted in conjunction with   the patient's medical history, clinical presentation and other   findings.  Clinical diagnosis should not be based on the result of   INNOVANCE D-Dimer alone.    CBC & Differential [[id number]]  (Abnormal) Collected: 12/10/20 0450    Specimen: Blood Updated: 12/10/20 0531    Narrative:      The following orders were created for panel order CBC & Differential.  Procedure                               Abnormality         Status                     ---------                                -----------         ------                     CBC Auto Differential[[id number]]        Abnormal            Final result                 Please view results for these tests on the individual orders.    CBC Auto Differential [[id number]]  (Abnormal) Collected: 12/10/20 0450    Specimen: Blood Updated: 12/10/20 0531     WBC 9.40 10*3/mm3      RBC 4.78 10*6/mm3      Hemoglobin 14.8 g/dL      Hematocrit 45.4 %      MCV 95.0 fL      MCH 31.0 pg      MCHC 32.6 g/dL      RDW 15.2 %      RDW-SD 50.8 fl      MPV 9.1 fL      Platelets 218 10*3/mm3      Neutrophil % 86.9 %      Lymphocyte % 5.9 %      Monocyte % 6.8 %      Eosinophil % 0.0 %      Basophil % 0.4 %      Neutrophils, Absolute 8.20 10*3/mm3      Lymphocytes, Absolute 0.60 10*3/mm3      Monocytes, Absolute 0.60 10*3/mm3      Eosinophils, Absolute 0.00 10*3/mm3      Basophils, Absolute 0.00 10*3/mm3      nRBC 0.1 /100 WBC         No results found for: HGBA1C            No results found for: LIPASE  No results found for: CHOL, CHLPL, TRIG, HDL, LDL, LDLDIRECT    No results found for: INTRAOP, PREDX, FINALDX, COMDX    Microbiology Results (last 10 days)     ** No results found for the last 240 hours. **          ECG/EMG Results (most recent)     Procedure Component Value Units Date/Time    ECG 12 Lead [[id number]]  (Abnormal) Resulted: 12/01/20 0256     Updated: 12/01/20 0256    ECG 12 Lead [[id number]] Collected: 12/01/20 0012     Updated: 12/01/20 1524     QT Interval 447 ms     Narrative:      HEART RATE= 91  bpm  RR Interval= 659  ms  IN Interval=   ms  P Horizontal Axis=   deg  P Front Axis=   deg  QRSD Interval= 93  ms  QT Interval= 447  ms  QRS Axis= -36  deg  T Wave Axis= -23  deg  - ABNORMAL ECG -  Atrial fibrillation  Inferior infarct, age indeterminate  Anterior infarct, old  Prolonged QT interval  No previous ECG available for comparison  Electronically Signed By: Ryne Portillo) 01-Dec-2020 15:22:22  Date and Time of Study:  2020-12-01 00:12:20    ECG 12 Lead [[id number]] Collected: 12/04/20 0525     Updated: 12/12/20 1102     QT Interval 391 ms     Narrative:      HEART RATE= 75  bpm  RR Interval= 801  ms  NV Interval=   ms  P Horizontal Axis=   deg  P Front Axis=   deg  QRSD Interval= 97  ms  QT Interval= 391  ms  QRS Axis= -37  deg  T Wave Axis= -5  deg  - ABNORMAL ECG -  Atrial fibrillation  Inferior infarct, old  Anterior infarct, old  Electronically Signed By: Singh Tse) 12-Dec-2020 11:01:23  Date and Time of Study: 2020-12-04 05:25:33                    No radiology results for the last 7 days        Xrays, labs reviewed personally by physician.    Medication Review:   I have reviewed the patient's current medication list      Scheduled Meds      Meds Infusions  No current facility-administered medications for this encounter.       Meds PRN          Assessment/Plan   Assessment/Plan     Active Hospital Problems    Diagnosis  POA   • **Acute respiratory failure due to COVID-19 (CMS/HCC) [U07.1, J96.00]  Yes   • Pneumonia of both lungs due to infectious organism [J18.9]  Unknown   • Subacute pulmonary embolism (CMS/HCC) [I26.99]  Unknown   • Renal disease [N28.9]  Yes   • CAD (coronary artery disease) [I25.10]  Yes   • Hypertension [I10]  Yes   • Stroke (CMS/HCC) [I63.9]  Yes   • Mood disorder (CMS/HCC) [F39]  Yes   • GERD (gastroesophageal reflux disease) [K21.9]  Yes   • Afib (CMS/HCC) [I48.91]  Yes   • Insomnia [G47.00]  Yes   • BPH (benign prostatic hyperplasia) [N40.0]  Yes      Resolved Hospital Problems   No resolved problems to display.       MEDICAL DECISION MAKING COMPLEXITY BY PROBLEM:        Acute respiratory failure due to Covid-19, Pneumonia due to Covid-19  -ABG: showed ph 7.39, CO2 43.2, O2 64, HCO3 26.5 on 100% non-rebreather  -currently on Bipap at 100%  -CT PE protocol: likely subacute pulmonary embolus within the distal main pulmonary artery on the left extending into the interlobar artery of the left  lower lobe. Findings throughout the lungs likely related to Covid pneumonia. Emphysema  -, ferritin normal, procalcitonin 0.39, d-dimer 0.62, WBC normal, temp 99.2  -given IV rocephin, zithromax, and decadron in the ER  On Decadron and Remdesivir  De-escalate antibiotics.  Now on doxycycline  On Bumex  Appreciate pulmonary input    Acute pulmonary embolus  -CT PE protocol as above  -Initially received heparin drip  Switch to Eliquis.      Positive blood culture likely contamination with coagulase-negative staph x1    CKD  -creatinine 1.31  -monitor BMP     COPD with acute exacerbation  -on oxygen at night at baseline  -tx as above     Chronic atrial fibrillation  -cont home cardizem  Now on Eliquis.  Discontinue Xarelto on discharge     Hypertension  -cont home cardizem,   Decrease dose of clonidine  Holding losartan     Previous CVA  -on chronic anticoagulation,   On statin       Depression  -cont home buspar, lexapro     Chronic pain  -on norco (Verified by Rx by [facility])     Chronic constipation  -prn laxatives      BPH  -cont home flomax        VTE Prophylaxis - switch to Eliquis       VTE Prophylaxis -   Mechanical Order History:     None      Pharmalogical Order History:      Ordered     Dose Route Frequency Stop    12/01/20 0309  enoxaparin (LOVENOX) syringe 40 mg  Status:  Discontinued      40 mg SC Every 24 Hours 12/01/20 1041    12/01/20 1042  heparin 36260 units/250 mL (100 units/mL) in 0.45 % NaCl infusion  14.97 mL/hr      14.4 Units/kg/hr IV Titrated --    12/01/20 1042  heparin bolus from bag 4,200 Units      40 Units/kg IV Every 6 Hours PRN --    12/01/20 1042  heparin bolus from bag 8,300 Units      80 Units/kg IV Every 6 Hours PRN --    12/01/20 0238  enoxaparin (LOVENOX) syringe 100 mg  Status:  Discontinued      1 mg/kg SC Once 12/01/20 0239                  Code Status -   Code Status and Medical Interventions:   Ordered at: 12/01/20 0308     Code Status:    CPR     Medical Interventions  (Level of Support Prior to Arrest):    Full       This patient has been examined wearing appropriate Personal Protective Equipment and discussed with hospital infection control department. 12/13/20        Discharge Planning  Pending clinical improvement, O2 needs being titrated down      Electronically signed by Svitlana Collins MD, 12/13/20, 20:50 EST.  [facility] Hospitalist Team

## 2020-12-25 ENCOUNTER — LAB REQUISITION (OUTPATIENT)
Dept: LAB | Facility: HOSPITAL | Age: 65
End: 2020-12-25

## 2020-12-25 DIAGNOSIS — Z00.00 ROUTINE GENERAL MEDICAL EXAMINATION AT A HEALTH CARE FACILITY: ICD-10-CM

## 2020-12-25 LAB
ANION GAP SERPL CALCULATED.3IONS-SCNC: 6.5 MMOL/L (ref 5–15)
BASOPHILS # BLD AUTO: 0.01 10*3/MM3 (ref 0–0.2)
BASOPHILS NFR BLD AUTO: 0.2 % (ref 0–1.5)
BUN SERPL-MCNC: 24 MG/DL (ref 8–23)
BUN/CREAT SERPL: 25.3 (ref 7–25)
CALCIUM SPEC-SCNC: 8.5 MG/DL (ref 8.6–10.5)
CHLORIDE SERPL-SCNC: 101 MMOL/L (ref 98–107)
CO2 SERPL-SCNC: 30.5 MMOL/L (ref 22–29)
CREAT SERPL-MCNC: 0.95 MG/DL (ref 0.76–1.27)
DEPRECATED RDW RBC AUTO: 48.1 FL (ref 37–54)
EOSINOPHIL # BLD AUTO: 0.14 10*3/MM3 (ref 0–0.4)
EOSINOPHIL NFR BLD AUTO: 2.1 % (ref 0.3–6.2)
ERYTHROCYTE [DISTWIDTH] IN BLOOD BY AUTOMATED COUNT: 14 % (ref 12.3–15.4)
GFR SERPL CREATININE-BSD FRML MDRD: 79 ML/MIN/1.73
GLUCOSE SERPL-MCNC: 133 MG/DL (ref 65–99)
HCT VFR BLD AUTO: 45.5 % (ref 37.5–51)
HGB BLD-MCNC: 14.9 G/DL (ref 13–17.7)
LYMPHOCYTES # BLD AUTO: 0.74 10*3/MM3 (ref 0.7–3.1)
LYMPHOCYTES NFR BLD AUTO: 11.3 % (ref 19.6–45.3)
MCH RBC QN AUTO: 31.2 PG (ref 26.6–33)
MCHC RBC AUTO-ENTMCNC: 32.7 G/DL (ref 31.5–35.7)
MCV RBC AUTO: 95.2 FL (ref 79–97)
MONOCYTES # BLD AUTO: 0.44 10*3/MM3 (ref 0.1–0.9)
MONOCYTES NFR BLD AUTO: 6.7 % (ref 5–12)
NEUTROPHILS NFR BLD AUTO: 5.09 10*3/MM3 (ref 1.7–7)
NEUTROPHILS NFR BLD AUTO: 77.4 % (ref 42.7–76)
NT-PROBNP SERPL-MCNC: 587.8 PG/ML (ref 0–900)
PLATELET # BLD AUTO: 106 10*3/MM3 (ref 140–450)
PMV BLD AUTO: 11.5 FL (ref 6–12)
POTASSIUM SERPL-SCNC: 4 MMOL/L (ref 3.5–5.2)
RBC # BLD AUTO: 4.78 10*6/MM3 (ref 4.14–5.8)
SODIUM SERPL-SCNC: 138 MMOL/L (ref 136–145)
WBC # BLD AUTO: 6.57 10*3/MM3 (ref 3.4–10.8)

## 2020-12-25 PROCEDURE — 85025 COMPLETE CBC W/AUTO DIFF WBC: CPT

## 2020-12-25 PROCEDURE — 83880 ASSAY OF NATRIURETIC PEPTIDE: CPT

## 2020-12-25 PROCEDURE — 80048 BASIC METABOLIC PNL TOTAL CA: CPT

## 2021-02-12 ENCOUNTER — OFFICE VISIT (OUTPATIENT)
Dept: CARDIOLOGY | Facility: CLINIC | Age: 66
End: 2021-02-12

## 2021-02-12 VITALS
SYSTOLIC BLOOD PRESSURE: 145 MMHG | OXYGEN SATURATION: 91 % | DIASTOLIC BLOOD PRESSURE: 92 MMHG | HEART RATE: 79 BPM | WEIGHT: 207 LBS | BODY MASS INDEX: 28.87 KG/M2

## 2021-02-12 DIAGNOSIS — I48.0 PAROXYSMAL ATRIAL FIBRILLATION (HCC): ICD-10-CM

## 2021-02-12 DIAGNOSIS — I10 ESSENTIAL HYPERTENSION: Chronic | ICD-10-CM

## 2021-02-12 DIAGNOSIS — I48.11 LONGSTANDING PERSISTENT ATRIAL FIBRILLATION (HCC): Chronic | ICD-10-CM

## 2021-02-12 DIAGNOSIS — I50.22 CHRONIC SYSTOLIC CONGESTIVE HEART FAILURE (HCC): ICD-10-CM

## 2021-02-12 DIAGNOSIS — I25.10 CORONARY ARTERY DISEASE INVOLVING NATIVE CORONARY ARTERY OF NATIVE HEART WITHOUT ANGINA PECTORIS: Primary | Chronic | ICD-10-CM

## 2021-02-12 DIAGNOSIS — I21.9 MYOCARDIAL INFARCTION, UNSPECIFIED MI TYPE, UNSPECIFIED ARTERY (HCC): ICD-10-CM

## 2021-02-12 PROCEDURE — 99214 OFFICE O/P EST MOD 30 MIN: CPT | Performed by: INTERNAL MEDICINE

## 2021-02-12 PROCEDURE — 93000 ELECTROCARDIOGRAM COMPLETE: CPT | Performed by: INTERNAL MEDICINE

## 2021-02-12 NOTE — PROGRESS NOTES
Cardiology Office Visit      Encounter Date:  02/12/2021    Patient ID:   Gordon Dowling is a 66 y.o. male.    Reason For Followup:  Coronary artery disease  Hypertension  Hyperlipidemia  Chronic atrial fibrillation  History of CVA    Brief Clinical History:  Dear  Provider, No Known    I had the pleasure of seeing Gordon Dowling today. As you are well aware, this is a 66 y.o. male with a prior history of known coronary artery disease prior coronary artery bypass surgery history of PCI and stenting/history of hypertension hyperlipidemia and also history of chronic atrial fibrillation history of CVA presented here for follow-up  Patient denies any symptoms of chest pain    Interval History:  Denies any new cardiac symptoms  Denies any chest pain   Shortness of breath unchanged from before  Seen hospitalization in December for active COVID-19 infection complicated by pneumonia and also pulmonary embolism  Currently doing better    Assessment & Plan    Impressions:  Coronary artery disease  Hypertension  Hyperlipidemia  Chronic atrial fibrillation  History of CVA  Recent COVID-19 infection  Oxygen by nasal cannula 2 L/min  Chronic hypoxia  Recent hospitalization with a diagnosis of pulmonary embolism and also active COVID-19 infection    Recommendations:  Continue Eliquis for anticoagulation for stroke prophylaxis  Current medical therapy for rate control for atrial fibrillation  Continue current medical management and continued aggressive risk factor modification  Recent labs that were done 2 weeks back including CBC CMP reviewed and discussed with the patient  Continue aspirin statin and beta-blockers  Continue close monitoring  Patient had active COVID-19 infection and also received vaccine for COVID-19  Occasions and labs reviewed and discussed with the patient  Follow-up in office in 6 months      Objective:    Vitals:  Vitals:    02/12/21 1034   BP: 145/92   Pulse: 79   SpO2: 91%   Weight: 93.9 kg (207 lb)        Physical Exam:    General: Alert, cooperative, no distress, appears stated age  Head:  Normocephalic, atraumatic, mucous membranes moist  Eyes:  Conjunctiva/corneas clear, EOM's intact     Neck:  Supple,  no adenopathy;      Lungs: Clear to auscultation bilaterally, no wheezes rhonchi rales are noted  Chest wall: No tenderness  Heart::  Regular rate and rhythm, S1 and S2 normal, no murmur, rub or gallop  Abdomen: Soft, non-tender, nondistended bowel sounds active  Extremities: No cyanosis, clubbing, or edema  Pulses: 2+ and symmetric all extremities  Skin:  No rashes or lesions  Neuro/psych: A&O x3. CN II through XII are grossly intact with appropriate affect      Allergies:  Allergies   Allergen Reactions   • Codeine Itching       Medication Review:     Current Outpatient Medications:   •  acetaminophen (TYLENOL) 325 MG tablet, Take 650 mg by mouth Every 6 (Six) Hours As Needed for Mild Pain ., Disp: , Rfl:   •  albuterol (ACCUNEB) 1.25 MG/3ML nebulizer solution, Take 1 ampule by nebulization Every 6 (Six) Hours As Needed for Wheezing., Disp: , Rfl:   •  albuterol (PROVENTIL) (2.5 MG/3ML) 0.083% nebulizer solution, Take 2.5 mg by nebulization Every 8 (Eight) Hours As Needed for Wheezing., Disp: , Rfl:   •  ammonium lactate (AMLACTIN) 12 % cream, Apply  topically to the appropriate area as directed As Needed for Dry Skin., Disp: , Rfl:   •  ammonium lactate (LAC-HYDRIN) 12 % lotion, Apply  topically to the appropriate area as directed Every 12 (Twelve) Hours., Disp: , Rfl:   •  apixaban (ELIQUIS) 5 MG tablet tablet, Take 1 tablet by mouth 2 (Two) Times a Day. Indications: DVT/PE (active thrombosis), Disp: 60 tablet, Rfl:   •  atorvastatin (LIPITOR) 40 MG tablet, Take 1 tablet by mouth Daily., Disp:  , Rfl:   •  benzonatate (TESSALON) 100 MG capsule, Take 100 mg by mouth 3 (Three) Times a Day As Needed for Cough., Disp: , Rfl:   •  benzonatate (TESSALON) 100 MG capsule, Take 100 mg by mouth Every 8 (Eight)  Hours As Needed for Cough., Disp: , Rfl:   •  bisacodyl (Biscolax) 10 MG suppository, Insert 10 mg into the rectum 2 (Two) Times a Day As Needed for Constipation., Disp: , Rfl:   •  bisacodyl (DULCOLAX) 10 MG suppository, Insert 10 mg into the rectum Daily., Disp: , Rfl:   •  bismuth subsalicylate (PEPTO BISMOL) 262 MG chewable tablet, Chew 524 mg 4 (Four) Times a Day Before Meals & at Bedtime., Disp: , Rfl:   •  bismuth subsalicylate (PEPTO BISMOL) 262 MG chewable tablet, Chew 524 mg Every 4 (Four) Hours As Needed for Diarrhea., Disp: , Rfl:   •  bumetanide (BUMEX) 1 MG tablet, Take 1 mg by mouth 3 (Three) Times a Day., Disp: , Rfl:   •  bumetanide (BUMEX) 1 MG tablet, Take 1 mg by mouth Daily., Disp: , Rfl:   •  busPIRone (BUSPAR) 15 MG tablet, Take 15 mg by mouth 3 (Three) Times a Day., Disp: , Rfl:   •  busPIRone (BUSPAR) 15 MG tablet, Take 15 mg by mouth 3 (Three) Times a Day., Disp: , Rfl:   •  cetirizine (zyrTEC) 10 MG tablet, Take 10 mg by mouth Daily., Disp: , Rfl:   •  Cetirizine HCl 10 MG capsule, Take 10 mg by mouth., Disp: , Rfl:   •  cloNIDine (CATAPRES) 0.1 MG tablet, Take 0.1 mg by mouth 2 (Two) Times a Day., Disp: , Rfl:   •  cloNIDine (CATAPRES) 0.1 MG tablet, Take 0.1 mg by mouth 2 (Two) Times a Day., Disp: , Rfl:   •  cyclobenzaprine (FLEXERIL) 5 MG tablet, 5 mg 3 (Three) Times a Day As Needed., Disp: , Rfl:   •  cyclobenzaprine (FLEXERIL) 5 MG tablet, Take 5 mg by mouth 3 (Three) Times a Day., Disp: , Rfl:   •  dexamethasone (DECADRON) 6 MG tablet, Take 1 tablet by mouth Daily With Breakfast., Disp: 4 tablet, Rfl: 0  •  dextromethorphan polistirex ER (Delsym) 30 MG/5ML Suspension Extended Release oral suspension, Take  by mouth., Disp: , Rfl:   •  dextromethorphan polistirex ER (DELSYM) 30 MG/5ML Suspension Extended Release oral suspension, Take 5 mL by mouth Every 6 (Six) Hours As Needed (cough)., Disp: , Rfl:   •  dilTIAZem (CARDIZEM) 120 MG tablet, 3 (Three) Times a Day., Disp: , Rfl:   •   dilTIAZem CD (CARDIZEM CD) 120 MG 24 hr capsule, Take 120 mg by mouth 3 (Three) Times a Day., Disp: , Rfl:   •  docusate sodium (COLACE) 100 MG capsule, Take 100 mg by mouth 2 (Two) Times a Day., Disp: , Rfl:   •  docusate sodium (COLACE) 100 MG capsule, Take 100 mg by mouth 3 (Three) Times a Day., Disp: , Rfl:   •  escitalopram (LEXAPRO) 5 MG tablet, Take 5 mg by mouth Daily., Disp: , Rfl:   •  escitalopram (LEXAPRO) 5 MG tablet, Take 5 mg by mouth Daily., Disp: , Rfl:   •  famotidine (PEPCID) 40 MG tablet, Take 40 mg by mouth 2 (Two) Times a Day., Disp: , Rfl:   •  famotidine (PEPCID) 40 MG tablet, Take 40 mg by mouth 2 (Two) Times a Day., Disp: , Rfl:   •  fluticasone (FLONASE) 50 MCG/ACT nasal spray, , Disp: , Rfl:   •  fluticasone (FLONASE) 50 MCG/ACT nasal spray, 2 sprays into the nostril(s) as directed by provider Daily., Disp: , Rfl:   •  fluticasone (FLOVENT HFA) 110 MCG/ACT inhaler, Inhale 1 puff Daily., Disp: , Rfl:   •  guaiFENesin (MUCINEX) 600 MG 12 hr tablet, Take 1,200 mg by mouth 2 (Two) Times a Day., Disp: , Rfl:   •  HYDROcodone-acetaminophen (NORCO) 5-325 MG per tablet, As Needed., Disp: , Rfl:   •  HYDROcodone-acetaminophen (NORCO) 5-325 MG per tablet, Take 1 tablet by mouth Every 6 (Six) Hours As Needed., Disp: , Rfl:   •  ipratropium-albuterol (DUO-NEB) 0.5-2.5 mg/3 ml nebulizer, Take 3 mL by nebulization Every 4 (Four) Hours As Needed for Wheezing., Disp: , Rfl:   •  KLOR-CON 10 MEQ CR tablet, Take 10 mEq by mouth. 2 tabs daily , Disp: , Rfl:   •  lactulose (CHRONULAC) 10 GM/15ML solution, Take 20 g by mouth Daily As Needed (constipation)., Disp: , Rfl:   •  lidocaine (LMX) 4 % cream, Apply  topically to the appropriate area as directed As Needed for Mild Pain ., Disp: , Rfl:   •  Lidocaine 4 % patch, Apply 1 patch topically Daily., Disp: , Rfl:   •  losartan (COZAAR) 100 MG tablet, 100 mg. 1/2 tablet po BID, Disp: , Rfl:   •  losartan (COZAAR) 100 MG tablet, Take 50 mg by mouth 2 (two) times  a day., Disp: , Rfl:   •  magnesium hydroxide (MILK OF MAGNESIA) 400 MG/5ML suspension, Take 30 mL by mouth Daily As Needed for Constipation., Disp: , Rfl:   •  Magnesium Sulfate, Laxative, (Epsom Salt) granules, Apply 1 Tbsp transdermally every 1 hour as needed for Hemorrhoids, place in warm water for sits baths, Disp: , Rfl:   •  Melatonin 3 MG capsule, Take  by mouth., Disp: , Rfl:   •  melatonin 3 MG tablet, Take 6 mg by mouth Every Night., Disp: , Rfl:   •  Menthol, Topical Analgesic, (Biofreeze) 4 % gel, Apply  topically., Disp: , Rfl:   •  Menthol, Topical Analgesic, (Biofreeze) 4 % gel, Apply  topically to the appropriate area as directed Every 12 (Twelve) Hours., Disp: , Rfl:   •  metoclopramide (REGLAN) 10 MG tablet, Take 10 mg by mouth 4 (Four) Times a Day Before Meals & at Bedtime., Disp: , Rfl:   •  metoclopramide (REGLAN) 10 MG tablet, Take 10 mg by mouth 3 (Three) Times a Day Before Meals., Disp: , Rfl:   •  MILK OF MAGNESIA 7.75 % suspension, , Disp: , Rfl:   •  mometasone-formoterol (DULERA 100) 100-5 MCG/ACT inhaler, Inhale 2 puffs 2 (Two) Times a Day., Disp: , Rfl:   •  mometasone-formoterol (DULERA 100) 100-5 MCG/ACT inhaler, Inhale 2 puffs Every Morning., Disp: , Rfl:   •  montelukast (SINGULAIR) 10 MG tablet, Take 10 mg by mouth Every Night., Disp: , Rfl:   •  nitroglycerin (NITROSTAT) 0.4 MG SL tablet, Nitrostat 0.4 mg sublingual tablet  Place 1 tablet by sublingual route., Disp: , Rfl:   •  nitroglycerin (NITROSTAT) 0.4 MG SL tablet, Place 0.4 mg under the tongue Every 5 (Five) Minutes As Needed for Chest Pain. Take no more than 3 doses in 15 minutes., Disp: , Rfl:   •  ondansetron (ZOFRAN) 4 MG tablet, Take 4 mg by mouth As Needed., Disp: , Rfl:   •  ondansetron (ZOFRAN) 4 MG tablet, Take 4 mg by mouth Every 8 (Eight) Hours As Needed for Nausea or Vomiting., Disp: , Rfl:   •  phenylephrine-cocoa Butter (PREPARATION H) 0.25-88.44 % suppository suppository, Preparation H(phenyleph,cocoa buttr)  0.25 %-88.44 % rectal suppository  Insert 1 suppository every day by rectal route as needed., Disp: , Rfl:   •  phenylephrine-shark liver oil-mineral oil-petrolatum (PREPARATION H) 0.25-3-14-71.9 % rectal ointment, Insert  into the rectum 2 (Two) Times a Day As Needed for Hemorrhoids., Disp: , Rfl:   •  polyethylene glycol (MIRALAX) 17 g packet, Take 17 g by mouth Daily As Needed (constipation)., Disp: , Rfl:   •  polyethylene glycol (MIRALAX) powder, Take 17 g by mouth Daily., Disp: , Rfl:   •  potassium chloride (K-DUR,KLOR-CON) 10 MEQ CR tablet, Take 20 mEq by mouth Every Morning., Disp: , Rfl:   •  rivaroxaban (XARELTO) 20 MG tablet, Xarelto 20 mg tablet  Take 1 tablet every day by oral route., Disp: , Rfl:   •  simethicone (MYLICON) 125 MG chewable tablet, Chew 125 mg Every 6 (Six) Hours As Needed for Flatulence., Disp: , Rfl:   •  simethicone (MYLICON) 125 MG chewable tablet, Chew 125 mg 3 (Three) Times a Day., Disp: , Rfl:   •  Sodium Phosphates (fleet enema) 7-19 GM/118ML enema, Insert  into the rectum Daily As Needed (constipation)., Disp: , Rfl:   •  tamsulosin (FLOMAX) 0.4 MG capsule 24 hr capsule, tamsulosin 0.4 mg capsule  Take 1 capsule every day by oral route., Disp: , Rfl:   •  tamsulosin (FLOMAX) 0.4 MG capsule 24 hr capsule, Take 1 capsule by mouth Daily., Disp: , Rfl:   •  thiamine (VITAMIN B-1) 100 MG tablet, Take 1 tablet by mouth Daily., Disp: , Rfl:   •  traZODone (DESYREL) 50 MG tablet, Take 50 mg by mouth Daily. 1.5 tablets daily, Disp: , Rfl:   •  traZODone (DESYREL) 50 MG tablet, Take 75 mg by mouth Every Night., Disp: , Rfl:   •  umeclidinium-vilanterol (Anoro Ellipta) 62.5-25 MCG/INH aerosol powder  inhaler, Inhale 1 puff Daily., Disp: , Rfl:   •  witch hazel-glycerin (TUCKS) pad, Insert  into the rectum As Needed for Irritation., Disp: , Rfl:     Family History:  Family History   Family history unknown: Yes       Past Medical History:  Past Medical History:   Diagnosis Date   • Afib  (CMS/HCC)    • Anemia    • Atrial fibrillation (CMS/HCC)    • BPH (benign prostatic hyperplasia)    • CAD (coronary artery disease)    • CHF (congestive heart failure) (CMS/HCC)    • Chronic kidney disease    • COPD (chronic obstructive pulmonary disease) (CMS/HCC)    • GERD (gastroesophageal reflux disease)    • Hypertension    • Insomnia    • Mood disorder (CMS/HCC)    • Myocardial infarction (CMS/HCC)    • Renal disease    • Stroke (CMS/HCC)        Past surgical History:  History reviewed. No pertinent surgical history.    Social History:  Social History     Socioeconomic History   • Marital status: Unknown     Spouse name: Not on file   • Number of children: Not on file   • Years of education: Not on file   • Highest education level: Not on file   Tobacco Use   • Smoking status: Never Smoker   • Smokeless tobacco: Never Used   Substance and Sexual Activity   • Alcohol use: Not Currently     Frequency: Never   • Drug use: Not Currently   • Sexual activity: Defer   Social History Narrative    ** Merged History Encounter **         Lives at [facility]       Review of Systems:  The following systems were reviewed as they relate to the cardiovascular system: Constitutional, Eyes, ENT, Cardiovascular, Respiratory, Gastrointestinal, Integumentary, Neurological, Psychiatric, Hematologic, Endocrine, Musculoskeletal, and Genitourinary. The pertinent cardiovascular findings are reported above with all other cardiovascular points within those systems being negative.    Diagnostic Study Review:     Current Electrocardiogram:    ECG 12 Lead    Date/Time: 2/12/2021 10:57 AM  Performed by: Radha Carty MD  Authorized by: Radha Carty MD   Comparison: compared with previous ECG   Similar to previous ECG  Rhythm: atrial fibrillation  Rate: normal  BPM: 79  Conduction: conduction normal  QRS axis: normal  Other findings: non-specific ST-T wave changes    Clinical impression: abnormal EKG              NOTE:  The following portions of the patient's history were reviewed and updated this visit as appropriate: allergies, current medications, past family history, past medical history, past social history, past surgical history and problem list.

## 2022-08-03 NOTE — PROGRESS NOTES
Continued Stay Note   [facility]     Patient Name: Gordon Dowling  MRN: [id number]  Today's Date: 12/1/2020    Admit Date: 12/1/2020    Discharge Plan     Row Name 12/01/20 1520       Plan    Plan  DC Plan: From [place], LT. Okay to return per facility liaison, needs confirmed with pt/family. No PASRR or pre-cert needed for return.    Patient/Family in Agreement with Plan  yes    Plan Comments  SW sent message to liaison (Brandy) to confirm return to facility at d/c and was informed pt is okay to return, LTC at facility. Requested liaison verify emergency contact, pending resonse. Secure chat sent to RN as well.     Phone communication only - no physical contact with patient or family.    DARIANA Sahu    Phone: [phone]  Cell: [phone]  Fax: [phone]  [email or web address]     Scheduled procedure with Patient at      Telephone Information:   Mobile [phone]      Scheduled Via: Case Request Order for  [facility]   Procedure date: 8/24   Procedure time: 815am ; Did patient request this specific time? No    -If non-ambulatory location, select reason: Not applicable  -Did patient request a specific facility other than MD's preferred facility? n/a; If so, which facility?   -If a MAC pt is scheduled, pt was notified a family member/friend is need to stay with the patient over night after their procedure: Yes                Notified patient about receiving an animated Carolyn program? Yes    Was patient informed of COVID testing Yes;    The following have been confirmed:  Insurance name confirmed as [place], will be the same at time of procedure?: Yes Ins Accepted at Facility? Yes  Latex Allergy No  Diabetic No  Sleep Apnea Yes  Diuretic/Water pill No  Defibrillator/Pacemaker No  MRSA hx No  Blood thinners: Coumadin (Warfarin) or Plavix Yes, patient told to get clearance      Aspirin Yes      Phentermine (diet pill) No  Constipation n/a;    Pre-Op testing required n/a, Patient informed NA  Prep required? Yes, Pharmacy is  (include location and/or phone number); Briefly reviewed? Yes; Prep cost range discussed? Yes  If procedure is scheduled 7 days or less, patient was told to  prep letter?: n/a